# Patient Record
Sex: FEMALE | Race: WHITE | NOT HISPANIC OR LATINO | Employment: FULL TIME | URBAN - METROPOLITAN AREA
[De-identification: names, ages, dates, MRNs, and addresses within clinical notes are randomized per-mention and may not be internally consistent; named-entity substitution may affect disease eponyms.]

---

## 2017-01-06 ENCOUNTER — GENERIC CONVERSION - ENCOUNTER (OUTPATIENT)
Dept: OTHER | Facility: OTHER | Age: 57
End: 2017-01-06

## 2017-01-11 ENCOUNTER — GENERIC CONVERSION - ENCOUNTER (OUTPATIENT)
Dept: OTHER | Facility: OTHER | Age: 57
End: 2017-01-11

## 2017-01-25 ENCOUNTER — GENERIC CONVERSION - ENCOUNTER (OUTPATIENT)
Dept: OTHER | Facility: OTHER | Age: 57
End: 2017-01-25

## 2017-02-03 ENCOUNTER — GENERIC CONVERSION - ENCOUNTER (OUTPATIENT)
Dept: OTHER | Facility: OTHER | Age: 57
End: 2017-02-03

## 2017-03-06 ENCOUNTER — GENERIC CONVERSION - ENCOUNTER (OUTPATIENT)
Dept: OTHER | Facility: OTHER | Age: 57
End: 2017-03-06

## 2017-03-17 ENCOUNTER — GENERIC CONVERSION - ENCOUNTER (OUTPATIENT)
Dept: OTHER | Facility: OTHER | Age: 57
End: 2017-03-17

## 2017-05-25 ENCOUNTER — ALLSCRIPTS OFFICE VISIT (OUTPATIENT)
Dept: OTHER | Facility: OTHER | Age: 57
End: 2017-05-25

## 2017-06-13 ENCOUNTER — HOSPITAL ENCOUNTER (EMERGENCY)
Facility: HOSPITAL | Age: 57
Discharge: HOME | End: 2017-06-13
Attending: EMERGENCY MEDICINE | Admitting: EMERGENCY MEDICINE
Payer: COMMERCIAL

## 2017-06-13 ENCOUNTER — APPOINTMENT (EMERGENCY)
Dept: RADIOLOGY | Facility: HOSPITAL | Age: 57
End: 2017-06-13
Payer: COMMERCIAL

## 2017-06-13 VITALS
HEIGHT: 61 IN | DIASTOLIC BLOOD PRESSURE: 65 MMHG | RESPIRATION RATE: 18 BRPM | SYSTOLIC BLOOD PRESSURE: 141 MMHG | OXYGEN SATURATION: 98 % | HEART RATE: 96 BPM | TEMPERATURE: 98.4 F | BODY MASS INDEX: 31.72 KG/M2 | WEIGHT: 168 LBS

## 2017-06-13 DIAGNOSIS — S90.02XA CONTUSION OF LEFT ANKLE: Primary | ICD-10-CM

## 2017-06-13 PROCEDURE — 99283 EMERGENCY DEPT VISIT LOW MDM: CPT

## 2017-06-13 PROCEDURE — 73610 X-RAY EXAM OF ANKLE: CPT | Performed by: EMERGENCY MEDICINE

## 2017-06-13 PROCEDURE — A9270 NON-COVERED ITEM OR SERVICE: HCPCS | Performed by: EMERGENCY MEDICINE

## 2017-06-13 RX ORDER — ACETAMINOPHEN 325 MG/1
650 TABLET ORAL ONCE
Status: COMPLETED | OUTPATIENT
Start: 2017-06-13 | End: 2017-06-13

## 2017-06-13 RX ADMIN — ACETAMINOPHEN 650 MG: 325 TABLET ORAL at 13:58

## 2017-06-26 DIAGNOSIS — R92.8 OTHER ABNORMAL AND INCONCLUSIVE FINDINGS ON DIAGNOSTIC IMAGING OF BREAST: ICD-10-CM

## 2017-06-26 DIAGNOSIS — Z12.31 ENCOUNTER FOR SCREENING MAMMOGRAM FOR MALIGNANT NEOPLASM OF BREAST: ICD-10-CM

## 2017-06-28 ENCOUNTER — GENERIC CONVERSION - ENCOUNTER (OUTPATIENT)
Dept: OTHER | Facility: OTHER | Age: 57
End: 2017-06-28

## 2017-07-08 ENCOUNTER — APPOINTMENT (EMERGENCY)
Dept: CT IMAGING | Facility: HOSPITAL | Age: 57
End: 2017-07-08
Payer: COMMERCIAL

## 2017-07-08 ENCOUNTER — HOSPITAL ENCOUNTER (EMERGENCY)
Facility: HOSPITAL | Age: 57
Discharge: HOME | End: 2017-07-08
Attending: EMERGENCY MEDICINE | Admitting: EMERGENCY MEDICINE
Payer: COMMERCIAL

## 2017-07-08 VITALS
WEIGHT: 171.96 LBS | HEIGHT: 61 IN | BODY MASS INDEX: 32.47 KG/M2 | HEART RATE: 78 BPM | TEMPERATURE: 98.3 F | DIASTOLIC BLOOD PRESSURE: 75 MMHG | RESPIRATION RATE: 18 BRPM | SYSTOLIC BLOOD PRESSURE: 126 MMHG | OXYGEN SATURATION: 97 %

## 2017-07-08 DIAGNOSIS — R10.9 ABDOMINAL PAIN: Primary | ICD-10-CM

## 2017-07-08 LAB
ALBUMIN SERPL BCP-MCNC: 4.1 G/DL (ref 3.5–5)
ALP SERPL-CCNC: 138 U/L (ref 46–116)
ALT SERPL W P-5'-P-CCNC: 19 U/L (ref 12–78)
ANION GAP SERPL CALCULATED.3IONS-SCNC: 7 MMOL/L (ref 4–13)
AST SERPL W P-5'-P-CCNC: 12 U/L (ref 5–45)
BACTERIA UR QL AUTO: NORMAL /HPF
BASOPHILS # BLD AUTO: 0.06 THOUSANDS/ΜL (ref 0–0.1)
BASOPHILS NFR BLD AUTO: 1 % (ref 0–1)
BILIRUB SERPL-MCNC: 0.2 MG/DL (ref 0.2–1)
BILIRUB UR QL STRIP: NEGATIVE
BUN SERPL-MCNC: 11 MG/DL (ref 5–25)
CALCIUM SERPL-MCNC: 9.9 MG/DL (ref 8.3–10.1)
CHLORIDE SERPL-SCNC: 105 MMOL/L (ref 100–108)
CLARITY UR: CLEAR
CO2 SERPL-SCNC: 29 MMOL/L (ref 21–32)
COLOR UR: YELLOW
CREAT SERPL-MCNC: 0.81 MG/DL (ref 0.6–1.3)
EOSINOPHIL # BLD AUTO: 0.14 THOUSAND/ΜL (ref 0–0.61)
EOSINOPHIL NFR BLD AUTO: 1 % (ref 0–6)
ERYTHROCYTE [DISTWIDTH] IN BLOOD BY AUTOMATED COUNT: 13.4 % (ref 11.6–15.1)
GFR SERPL CREATININE-BSD FRML MDRD: >60 ML/MIN/1.73SQ M
GLUCOSE SERPL-MCNC: 99 MG/DL (ref 65–140)
GLUCOSE UR STRIP-MCNC: NEGATIVE MG/DL
HCT VFR BLD AUTO: 42.6 % (ref 34.8–46.1)
HGB BLD-MCNC: 13.9 G/DL (ref 11.5–15.4)
HGB UR QL STRIP.AUTO: ABNORMAL
KETONES UR STRIP-MCNC: NEGATIVE MG/DL
LEUKOCYTE ESTERASE UR QL STRIP: NEGATIVE
LIPASE SERPL-CCNC: 145 U/L (ref 73–393)
LYMPHOCYTES # BLD AUTO: 4.6 THOUSANDS/ΜL (ref 0.6–4.47)
LYMPHOCYTES NFR BLD AUTO: 39 % (ref 14–44)
MCH RBC QN AUTO: 32 PG (ref 26.8–34.3)
MCHC RBC AUTO-ENTMCNC: 32.6 G/DL (ref 31.4–37.4)
MCV RBC AUTO: 98 FL (ref 82–98)
MONOCYTES # BLD AUTO: 0.79 THOUSAND/ΜL (ref 0.17–1.22)
MONOCYTES NFR BLD AUTO: 7 % (ref 4–12)
NEUTROPHILS # BLD AUTO: 6.06 THOUSANDS/ΜL (ref 1.85–7.62)
NEUTS SEG NFR BLD AUTO: 52 % (ref 43–75)
NITRITE UR QL STRIP: NEGATIVE
NON-SQ EPI CELLS URNS QL MICRO: NORMAL /HPF
NRBC BLD AUTO-RTO: 0 /100 WBCS
PH UR STRIP.AUTO: 6 [PH] (ref 4.5–8)
PLATELET # BLD AUTO: 290 THOUSANDS/UL (ref 149–390)
PMV BLD AUTO: 10.1 FL (ref 8.9–12.7)
POTASSIUM SERPL-SCNC: 4.4 MMOL/L (ref 3.5–5.3)
PROT SERPL-MCNC: 7.4 G/DL (ref 6.4–8.2)
PROT UR STRIP-MCNC: NEGATIVE MG/DL
RBC # BLD AUTO: 4.35 MILLION/UL (ref 3.81–5.12)
RBC #/AREA URNS AUTO: NORMAL /HPF
SODIUM SERPL-SCNC: 141 MMOL/L (ref 136–145)
SP GR UR STRIP.AUTO: <=1.005 (ref 1–1.03)
UROBILINOGEN UR QL STRIP.AUTO: 0.2 E.U./DL
WBC # BLD AUTO: 11.68 THOUSAND/UL (ref 4.31–10.16)
WBC #/AREA URNS AUTO: NORMAL /HPF

## 2017-07-08 PROCEDURE — 99284 EMERGENCY DEPT VISIT MOD MDM: CPT

## 2017-07-08 PROCEDURE — 74176 CT ABD & PELVIS W/O CONTRAST: CPT

## 2017-07-08 PROCEDURE — 36415 COLL VENOUS BLD VENIPUNCTURE: CPT | Performed by: EMERGENCY MEDICINE

## 2017-07-08 PROCEDURE — 85025 COMPLETE CBC W/AUTO DIFF WBC: CPT | Performed by: EMERGENCY MEDICINE

## 2017-07-08 PROCEDURE — 83690 ASSAY OF LIPASE: CPT | Performed by: EMERGENCY MEDICINE

## 2017-07-08 PROCEDURE — 81001 URINALYSIS AUTO W/SCOPE: CPT | Performed by: EMERGENCY MEDICINE

## 2017-07-08 PROCEDURE — 80053 COMPREHEN METABOLIC PANEL: CPT | Performed by: EMERGENCY MEDICINE

## 2017-07-08 RX ORDER — FESOTERODINE FUMARATE 4 MG/1
TABLET, EXTENDED RELEASE ORAL
COMMUNITY
End: 2018-02-08 | Stop reason: SDUPTHER

## 2017-07-08 RX ORDER — TRAMADOL HYDROCHLORIDE 50 MG/1
50 TABLET ORAL EVERY 6 HOURS PRN
Qty: 15 TABLET | Refills: 0 | Status: SHIPPED | OUTPATIENT
Start: 2017-07-08 | End: 2017-07-13

## 2017-07-08 RX ORDER — ASPIRIN 81 MG/1
81 TABLET, CHEWABLE ORAL DAILY
COMMUNITY
End: 2018-02-08 | Stop reason: SDUPTHER

## 2017-07-08 RX ORDER — LEVOTHYROXINE SODIUM 0.05 MG/1
50 TABLET ORAL DAILY
COMMUNITY
End: 2018-02-08 | Stop reason: SDUPTHER

## 2017-07-08 RX ORDER — OXYCODONE HYDROCHLORIDE AND ACETAMINOPHEN 5; 325 MG/1; MG/1
1 TABLET ORAL EVERY 4 HOURS PRN
Qty: 15 TABLET | Refills: 0 | Status: SHIPPED | OUTPATIENT
Start: 2017-07-08 | End: 2017-07-08

## 2017-11-09 ENCOUNTER — ALLSCRIPTS OFFICE VISIT (OUTPATIENT)
Dept: OTHER | Facility: OTHER | Age: 57
End: 2017-11-09

## 2017-11-09 DIAGNOSIS — R53.81 OTHER MALAISE: ICD-10-CM

## 2017-11-09 DIAGNOSIS — R19.7 DIARRHEA: ICD-10-CM

## 2017-11-09 DIAGNOSIS — R11.2 NAUSEA WITH VOMITING: ICD-10-CM

## 2017-11-09 DIAGNOSIS — R74.8 ABNORMAL LEVELS OF OTHER SERUM ENZYMES: ICD-10-CM

## 2017-11-09 DIAGNOSIS — R53.83 OTHER FATIGUE: ICD-10-CM

## 2017-11-10 ENCOUNTER — APPOINTMENT (OUTPATIENT)
Dept: LAB | Facility: HOSPITAL | Age: 57
End: 2017-11-10
Payer: COMMERCIAL

## 2017-11-10 ENCOUNTER — TRANSCRIBE ORDERS (OUTPATIENT)
Dept: ADMINISTRATIVE | Facility: HOSPITAL | Age: 57
End: 2017-11-10

## 2017-11-10 DIAGNOSIS — R19.7 DIARRHEA: ICD-10-CM

## 2017-11-10 DIAGNOSIS — R53.81 OTHER MALAISE: ICD-10-CM

## 2017-11-10 DIAGNOSIS — R11.2 NAUSEA WITH VOMITING: ICD-10-CM

## 2017-11-10 DIAGNOSIS — R53.83 OTHER FATIGUE: ICD-10-CM

## 2017-11-10 LAB
ALBUMIN SERPL BCP-MCNC: 4 G/DL (ref 3.5–5)
ALP SERPL-CCNC: 177 U/L (ref 46–116)
ALT SERPL W P-5'-P-CCNC: 30 U/L (ref 12–78)
ANION GAP SERPL CALCULATED.3IONS-SCNC: 9 MMOL/L (ref 4–13)
AST SERPL W P-5'-P-CCNC: 14 U/L (ref 5–45)
BASOPHILS # BLD AUTO: 0.05 THOUSANDS/ΜL (ref 0–0.1)
BASOPHILS NFR BLD AUTO: 1 % (ref 0–1)
BILIRUB SERPL-MCNC: 0.4 MG/DL (ref 0.2–1)
BUN SERPL-MCNC: 9 MG/DL (ref 5–25)
CALCIUM SERPL-MCNC: 9.6 MG/DL (ref 8.3–10.1)
CHLORIDE SERPL-SCNC: 104 MMOL/L (ref 100–108)
CO2 SERPL-SCNC: 28 MMOL/L (ref 21–32)
CREAT SERPL-MCNC: 0.87 MG/DL (ref 0.6–1.3)
EOSINOPHIL # BLD AUTO: 0.14 THOUSAND/ΜL (ref 0–0.61)
EOSINOPHIL NFR BLD AUTO: 2 % (ref 0–6)
ERYTHROCYTE [DISTWIDTH] IN BLOOD BY AUTOMATED COUNT: 13.2 % (ref 11.6–15.1)
GFR SERPL CREATININE-BSD FRML MDRD: 75 ML/MIN/1.73SQ M
GLUCOSE P FAST SERPL-MCNC: 99 MG/DL (ref 65–99)
HCT VFR BLD AUTO: 41.7 % (ref 34.8–46.1)
HGB BLD-MCNC: 13.4 G/DL (ref 11.5–15.4)
LYMPHOCYTES # BLD AUTO: 2.91 THOUSANDS/ΜL (ref 0.6–4.47)
LYMPHOCYTES NFR BLD AUTO: 36 % (ref 14–44)
MCH RBC QN AUTO: 31.6 PG (ref 26.8–34.3)
MCHC RBC AUTO-ENTMCNC: 32.1 G/DL (ref 31.4–37.4)
MCV RBC AUTO: 98 FL (ref 82–98)
MONOCYTES # BLD AUTO: 0.51 THOUSAND/ΜL (ref 0.17–1.22)
MONOCYTES NFR BLD AUTO: 6 % (ref 4–12)
NEUTROPHILS # BLD AUTO: 4.45 THOUSANDS/ΜL (ref 1.85–7.62)
NEUTS SEG NFR BLD AUTO: 55 % (ref 43–75)
NRBC BLD AUTO-RTO: 0 /100 WBCS
PLATELET # BLD AUTO: 287 THOUSANDS/UL (ref 149–390)
PMV BLD AUTO: 10.2 FL (ref 8.9–12.7)
POTASSIUM SERPL-SCNC: 4.1 MMOL/L (ref 3.5–5.3)
PROT SERPL-MCNC: 7.5 G/DL (ref 6.4–8.2)
RBC # BLD AUTO: 4.24 MILLION/UL (ref 3.81–5.12)
SODIUM SERPL-SCNC: 141 MMOL/L (ref 136–145)
TSH SERPL DL<=0.05 MIU/L-ACNC: 0.97 UIU/ML (ref 0.36–3.74)
WBC # BLD AUTO: 8.09 THOUSAND/UL (ref 4.31–10.16)

## 2017-11-10 PROCEDURE — 86663 EPSTEIN-BARR ANTIBODY: CPT

## 2017-11-10 PROCEDURE — 36415 COLL VENOUS BLD VENIPUNCTURE: CPT

## 2017-11-10 PROCEDURE — 80053 COMPREHEN METABOLIC PANEL: CPT

## 2017-11-10 PROCEDURE — 85025 COMPLETE CBC W/AUTO DIFF WBC: CPT

## 2017-11-10 PROCEDURE — 84443 ASSAY THYROID STIM HORMONE: CPT

## 2017-11-10 PROCEDURE — 86665 EPSTEIN-BARR CAPSID VCA: CPT

## 2017-11-10 PROCEDURE — 86664 EPSTEIN-BARR NUCLEAR ANTIGEN: CPT

## 2017-11-10 PROCEDURE — 86618 LYME DISEASE ANTIBODY: CPT

## 2017-11-10 NOTE — PROGRESS NOTES
Assessment    1  Left acute suppurative otitis media (382 00) (H66 002)   2  Malaise and fatigue (780 79) (R53 81,R53 83)   3  Nausea, vomiting, and diarrhea (787 91,787 01) (R11 2,R19 7)    Plan  Left acute suppurative otitis media    · Azelastine HCl - 0 15 % Nasal Solution; 2 SPRAYS EACH NOSTRIL TWICE DAILY   · Ciprodex 0 3-0 1 % Otic Suspension; INSTILL 4 DROPS IN THE AFFECTEDEAR(S) TWICE DAILY   · Meclizine HCl - 25 MG Oral Tablet; TAKE 1 TABLET 3 TIMES DAILY AS NEEDED  Malaise and fatigue, Nausea, vomiting, and diarrhea    · (1) CBC/PLT/DIFF; Status:Active; Requested for:09Nov2017;    · (1) COMPREHENSIVE METABOLIC PANEL; Status:Active; Requested for:09Nov2017;    · (1) ELIO BARR VIRUS; Status:Active; Requested for:09Nov2017;    · (1) LYME ANTIBODY PROFILE W/REFLEX TO WESTERN BLOT; Status:Active; Requested for:09Nov2017;    · (1) TSH; Status:Active; Requested ELÍAS:[id number];     Discussion/Summary    Patient is to start antibiotic and nasal spray  She is to use meclizine for her vertigo type symptoms  She is to continue increasing her fluids and eating bland foods  If she continues to worsen or is not improving she will follow up  Labs are ordered and we will follow up with her after we get the results  The patient was counseled regarding instructions for management,-- risk factor reductions,-- impressions,-- risks and benefits of treatment options,-- importance of compliance with treatment  Possible side effects of new medications were reviewed with the patient/guardian today  The treatment plan was reviewed with the patient/guardian  The patient/guardian understands and agrees with the treatment plan     Self Referrals: No      Chief Complaint  Pt  in office today c/o nausea, vomiting, diarrhea, nasal congestion, dizziness, fatigue, and cough  History of Present Illness  HPI: Patient began 2 weeks ago with nausea vomiting and then started with diarrhea   She had that off and on felt very fatigued and weak  This week she started with congestion and cough  She now has ear pain and feels some slight dizziness  She still feels extremely fatigued  Otitis Media (Brief): The patient is being seen for an initial evaluation of otitis media  Symptoms:  ear pain,-- fullness of the ear(s),-- difficulty hearing,-- fever,-- nasal congestion-- and-- cough  The patient is currently experiencing symptoms  Associated symptoms:  dizziness-- and-- headache  General Ill Feeling: The patient is being seen for an initial evaluation of a general ill feeling  Symptoms:  general ill feeling,-- malaise,-- fatigue,-- fever-- and-- chills, but-- no arthralgias,-- no myalgias,-- no muscle weakness,-- no joint swelling,-- no joint stiffness,-- no weight loss,-- no weight gain,-- no difficulty sleeping,-- no anxiety,-- no depression-- and-- no suicidal thoughts  Associated symptoms:  headache,-- vertigo,-- cough,-- anorexia,-- nausea,-- vomiting-- and-- diarrhea, but-- no visual disturbance,-- no temporal tenderness,-- no lightheadedness,-- no chest pain,-- no shortness of breath,-- no edema,-- no abdominal pain,-- no constipation,-- no melena,-- no hematochezia,-- no dysuria,-- no polyuria,-- no polydipsia,-- no menstrual irregularity-- and-- no decreased libido  Review of Systems   Constitutional: as noted in HPI   ENT: as noted in HPI  Respiratory: as noted in HPI  Gastrointestinal: as noted in HPI  Neurological: as noted in HPI  ROS reviewed  Active Problems  1  Asthma without status asthmaticus (493 90) (J45 909)   2  Degeneration of lumbar intervertebral disc (722 52) (M51 36)   3  Hypothyroidism (244 9) (E03 9)   4  Menopause ovarian failure (256 39) (E28 39)   5  Mixed hyperlipidemia (272 2) (E78 2)   6  Seasonal allergies (477 9) (J30 2)    Past Medical History    1  History of Acute upper respiratory infection (465 9) (J06 9)   2  History of Acute URI (465 9) (J06 9)   3   History of Breast pain in female (611 71) (N64 4)   4  History of C  difficile diarrhea (008 45) (A04 72)   5  History of Colon cancer screening (V76 51) (Z12 11)   6  History of Encounter for gynecological examination with Papanicolaou smear of cervix (V72 31) (Z01 419)   7  History of Encounter for mammogram to establish baseline mammogram (V76 12) (Z12 31)   8  History of Enteritis (558 9) (K52 9)   9  History of abnormal mammogram (V15 89) (Z87 898)   10  History of abrasion (V15 59) (Z87 828)   11  History of acute bronchitis (V12 69) (Z87 09)   12  History of acute sinusitis (V12 69) (Z87 09)   13  History of chronic cystitis (V13 00) (Z87 448)   14  History of colonoscopy (V45 89) (Z98 890)   15  History of diarrhea (V12 79) (Z87 898)   16  History of dizziness (V13 89) (Z87 898)   17  History of dysuria (V13 00) (Z87 898)   18  History of epigastric pain (V12 79) (Z87 19)   19  History of gross hematuria (V13 09) (Z87 448)   20  History of headache (V13 89) (Z87 898)   21  History of photophobia (V49 89) (Z78 9)   22  History of screening mammography (V15 89) (Z92 89)   23  History of screening mammography (V15 89) (Z92 89)   24  History of upper respiratory infection (V12 09) (Z87 09)   25  History of viral infection (V12 09) (Z86 19)   26  Hypercholesterolemia (272 0) (E78 00)   27  History of Leg pain (729 5) (M79 606)   28  History of Non-suppurative otitis media, bilateral   29  History of Snake bite (989 5,E905 0) ([place])   27  History of Tick bite (919 4,E906 4) [facility])  Active Problems And Past Medical History Reviewed: The active problems and past medical history were reviewed and updated today  Family History  Mother    1  Family history of Heart disease (429 9) (I51 9)  Father    2  Family history of Heart disease (429 9) (I51 9)  Paternal Grandfather    3  Family history of Cancer (199 1) (C80 1)  Paternal Great Grandfather    4  Family history of Heart disease (429 9) (I51 9)  Family History Reviewed:    The family history was reviewed and updated today  Social History   · Always uses seat belt   · Does not drink alcohol (V49 89) (Z78 9)   · Employed   · No drug use   · Occasional caffeine consumption   · Seeing a dentist   · Single   · Smoker (305 1) (F17 200)  The social history was reviewed and is unchanged  Surgical History    1  History of Cholecystectomy   2  History of Hysteroscopy With Endometrial Ablation   3  History of Knee Surgery   4  History of Ovarian Cystectomy   5  History of Shoulder Surgery  Surgical History Reviewed: The surgical history was reviewed and updated today  Current Meds   1  Aspirin 81 MG TABS; Therapy: (Recorded:29Oct2014) to Recorded   2  Levocetirizine Dihydrochloride 5 MG Oral Tablet; take 1 tablet by mouth every day; Therapy: 67LTU4043 to (Chikis Roa)  Requested for: 99Wrd5201; Last Rx:[id number] Ordered   3  Levothyroxine Sodium 50 MCG Oral Tablet; take 1 tablet by mouth every day; Therapy: 18Kjl1938 to (Chikis Roa)  Requested for: 48Uaj7792; Last Rx:76Xkw4761 Ordered   4  Orphenadrine Citrate  MG Oral Tablet Extended Release 12 Hour; TAKE 1 TABLET BY MOUTH TWICE A DAY AS NEEDED; Therapy: 06ZSB6624 to (Evaluate:36Qbk2853)  Requested for: 06ZKN7375; Last Rx:14Jun2017 Ordered   5  Probiotic CAPS; Therapy: (Recorded:18Jun2015) to Recorded   6  Toviaz 4 MG Oral Tablet Extended Release 24 Hour; Therapy: (Recorded:74Tny4899) to Recorded   7  Ventolin  (90 Base) MCG/ACT Inhalation Aerosol Solution; 2 puffs every 4 hours as needed; Therapy: 73Khw2381 to (Evaluate:19Jun2017)  Requested for: 77HGU1672; Last Rx:74Lpi8154 Ordered   8  Vitamin B Complex Oral Tablet; Therapy: (Recorded:18Jun2015) to Recorded   9  Vitamin D 1000 UNIT CAPS; Therapy: (Recorded:18Jun2015) to Recorded    Allergies  1  Aminosyn SOLN   2  Procalamine 3 % Intravenous Solution   3  amoxicillin   4  Ceftin   5  Cipro   6  erythromycin   7  fentanyl   8  morphine   9  Sulfa Drugs   10  Zithromax   11  acetaminophen-codeine   12  Avocado Oil OIL   13  Imitrex TABS   14  Lactose  15  IVP Dye   16  Dairy   17  Eggs   18  Gluten   19  Other   20  Wheat    Vitals   Recorded: 10IHR2520 03:01PM   Temperature 99 2 F   Heart Rate 98   Systolic 127   Diastolic 76   Height 5 ft    Weight 164 lb    BMI Calculated 32 03   BSA Calculated 1 72   O2 Saturation 100       Physical Exam   Constitutional  General appearance: Abnormal   acutely ill  Ears, Nose, Mouth, and Throat  External inspection of ears and nose: Normal    Otoscopic examination: Abnormal  -- Left TM is erythematous and with mucoid exudate  Nasal mucosa, septum, and turbinates: Normal without edema or erythema  Oropharynx: Abnormal  -- Mild erythema  Pulmonary  Respiratory effort: No increased work of breathing or signs of respiratory distress  Auscultation of lungs: Clear to auscultation  Cardiovascular  Auscultation of heart: Normal rate and rhythm, normal S1 and S2, without murmurs           Signatures   Electronically signed by : Gisselle Dhillon, [facility]; Nov 9 2017  3:58PM EST                       (Author)    Electronically signed by : JORDON Watkins ; Nov 9 2017  5:09PM EST

## 2017-11-11 LAB
EBV EA IGG SER-ACNC: <9 U/ML (ref 0–8.9)
EBV NA IGG SER IA-ACNC: 147 U/ML (ref 0–17.9)
EBV PATRN SPEC IB-IMP: ABNORMAL
EBV VCA IGG SER IA-ACNC: 195 U/ML (ref 0–17.9)
EBV VCA IGM SER IA-ACNC: <36 U/ML (ref 0–35.9)

## 2017-11-13 ENCOUNTER — TRANSCRIBE ORDERS (OUTPATIENT)
Dept: ADMINISTRATIVE | Facility: HOSPITAL | Age: 57
End: 2017-11-13

## 2017-11-13 ENCOUNTER — APPOINTMENT (OUTPATIENT)
Dept: LAB | Facility: HOSPITAL | Age: 57
End: 2017-11-13
Payer: COMMERCIAL

## 2017-11-13 DIAGNOSIS — R19.7 DIARRHEA: ICD-10-CM

## 2017-11-13 DIAGNOSIS — R11.2 NAUSEA WITH VOMITING: ICD-10-CM

## 2017-11-13 DIAGNOSIS — R74.8 ELEVATED LIVER ENZYMES: Primary | ICD-10-CM

## 2017-11-13 LAB
B BURGDOR IGG SER IA-ACNC: 0.17
B BURGDOR IGM SER IA-ACNC: 0.2

## 2017-11-13 PROCEDURE — 36415 COLL VENOUS BLD VENIPUNCTURE: CPT

## 2017-11-13 PROCEDURE — 80074 ACUTE HEPATITIS PANEL: CPT

## 2017-11-14 LAB
HAV IGM SER QL: NORMAL
HBV CORE IGM SER QL: NORMAL
HBV SURFACE AG SER QL: NORMAL
HCV AB SER QL: NORMAL

## 2017-11-24 ENCOUNTER — HOSPITAL ENCOUNTER (OUTPATIENT)
Dept: RADIOLOGY | Age: 57
Discharge: HOME | End: 2017-11-24
Payer: COMMERCIAL

## 2017-11-24 DIAGNOSIS — R74.8 ELEVATED LIVER ENZYMES: ICD-10-CM

## 2017-11-24 PROCEDURE — 76705 ECHO EXAM OF ABDOMEN: CPT

## 2017-12-08 ENCOUNTER — ALLSCRIPTS OFFICE VISIT (OUTPATIENT)
Dept: OTHER | Facility: OTHER | Age: 57
End: 2017-12-08

## 2017-12-28 ENCOUNTER — LAB REQUISITION (OUTPATIENT)
Dept: LAB | Facility: HOSPITAL | Age: 57
End: 2017-12-28
Payer: COMMERCIAL

## 2017-12-28 ENCOUNTER — ALLSCRIPTS OFFICE VISIT (OUTPATIENT)
Dept: OTHER | Facility: OTHER | Age: 57
End: 2017-12-28

## 2017-12-28 DIAGNOSIS — R30.0 DYSURIA: ICD-10-CM

## 2017-12-28 LAB
CLARITY UR: NORMAL
COLOR UR: NORMAL
GLUCOSE (HISTORICAL): NORMAL
HGB UR QL STRIP.AUTO: NORMAL
KETONES UR STRIP-MCNC: NORMAL MG/DL
LEUKOCYTE ESTERASE UR QL STRIP: NORMAL
NITRITE UR QL STRIP: NORMAL
PH UR STRIP.AUTO: 5 [PH]
PROT UR STRIP-MCNC: NORMAL MG/DL
SP GR UR STRIP.AUTO: 1.01

## 2017-12-28 PROCEDURE — 87086 URINE CULTURE/COLONY COUNT: CPT | Performed by: PHYSICIAN ASSISTANT

## 2017-12-29 LAB — BACTERIA UR CULT: NORMAL

## 2018-01-14 VITALS
HEIGHT: 60 IN | DIASTOLIC BLOOD PRESSURE: 86 MMHG | TEMPERATURE: 99.6 F | BODY MASS INDEX: 31.41 KG/M2 | HEART RATE: 95 BPM | OXYGEN SATURATION: 97 % | WEIGHT: 160 LBS | SYSTOLIC BLOOD PRESSURE: 140 MMHG

## 2018-01-14 VITALS
HEART RATE: 98 BPM | HEIGHT: 60 IN | SYSTOLIC BLOOD PRESSURE: 140 MMHG | WEIGHT: 164 LBS | BODY MASS INDEX: 32.2 KG/M2 | DIASTOLIC BLOOD PRESSURE: 76 MMHG | OXYGEN SATURATION: 100 % | TEMPERATURE: 99.2 F

## 2018-01-22 VITALS
OXYGEN SATURATION: 98 % | TEMPERATURE: 99.1 F | WEIGHT: 164.38 LBS | DIASTOLIC BLOOD PRESSURE: 88 MMHG | BODY MASS INDEX: 32.27 KG/M2 | SYSTOLIC BLOOD PRESSURE: 128 MMHG | HEIGHT: 60 IN | HEART RATE: 83 BPM

## 2018-01-23 VITALS
OXYGEN SATURATION: 98 % | SYSTOLIC BLOOD PRESSURE: 124 MMHG | HEIGHT: 60 IN | DIASTOLIC BLOOD PRESSURE: 74 MMHG | HEART RATE: 99 BPM | BODY MASS INDEX: 32.39 KG/M2 | TEMPERATURE: 98.8 F | WEIGHT: 165 LBS

## 2018-01-23 NOTE — MISCELLANEOUS
Message  Return to work or school:   Aniya Mathis is under my professional care  She was seen in my office on 12/08/17   She is able to return to work on  12/11/17       MONICA Espinoza        Signatures   Electronically signed by : MONICA Espinoza; Dec  8 2017  8:39AM EST                       (Author)

## 2018-02-08 ENCOUNTER — HOSPITAL ENCOUNTER (OUTPATIENT)
Dept: RADIOLOGY | Facility: HOSPITAL | Age: 58
Discharge: HOME | End: 2018-02-08
Payer: COMMERCIAL

## 2018-02-08 ENCOUNTER — OFFICE VISIT (OUTPATIENT)
Dept: FAMILY MEDICINE CLINIC | Facility: CLINIC | Age: 58
End: 2018-02-08
Payer: COMMERCIAL

## 2018-02-08 VITALS
HEIGHT: 61 IN | TEMPERATURE: 98.6 F | SYSTOLIC BLOOD PRESSURE: 134 MMHG | DIASTOLIC BLOOD PRESSURE: 76 MMHG | OXYGEN SATURATION: 99 % | WEIGHT: 172 LBS | RESPIRATION RATE: 16 BRPM | HEART RATE: 101 BPM | BODY MASS INDEX: 32.47 KG/M2

## 2018-02-08 DIAGNOSIS — M79.641 RIGHT HAND PAIN: ICD-10-CM

## 2018-02-08 DIAGNOSIS — M79.641 RIGHT HAND PAIN: Primary | ICD-10-CM

## 2018-02-08 PROBLEM — R74.8 ABNORMAL LIVER ENZYMES: Status: ACTIVE | Noted: 2017-11-10

## 2018-02-08 PROCEDURE — 73130 X-RAY EXAM OF HAND: CPT

## 2018-02-08 PROCEDURE — 99213 OFFICE O/P EST LOW 20 MIN: CPT | Performed by: NURSE PRACTITIONER

## 2018-02-08 RX ORDER — ORPHENADRINE CITRATE 100 MG/1
1 TABLET, EXTENDED RELEASE ORAL 2 TIMES DAILY PRN
COMMUNITY
Start: 2014-06-16 | End: 2018-09-16 | Stop reason: SDUPTHER

## 2018-02-08 RX ORDER — LEVOCETIRIZINE DIHYDROCHLORIDE 5 MG/1
1 TABLET, FILM COATED ORAL DAILY
COMMUNITY
Start: 2015-05-20 | End: 2018-03-24 | Stop reason: SDUPTHER

## 2018-02-08 RX ORDER — AZELASTINE HCL 205.5 UG/1
2 SPRAY NASAL 2 TIMES DAILY
COMMUNITY
Start: 2017-11-09 | End: 2018-04-25 | Stop reason: SDUPTHER

## 2018-02-08 RX ORDER — FESOTERODINE FUMARATE 4 MG/1
1 TABLET, EXTENDED RELEASE ORAL DAILY
COMMUNITY
End: 2019-04-01 | Stop reason: SDUPTHER

## 2018-02-08 RX ORDER — LEVOTHYROXINE SODIUM 0.05 MG/1
1 TABLET ORAL DAILY
COMMUNITY
Start: 2015-04-21 | End: 2018-03-24 | Stop reason: SDUPTHER

## 2018-02-08 NOTE — ASSESSMENT & PLAN NOTE
To obtain x-ray now, to call for results tomorrow  Will provide ortho referral if fracture present  Continue ice  Follow-up as needed

## 2018-02-08 NOTE — PROGRESS NOTES
Assessment/Plan:    Right hand pain  To obtain x-ray now, to call for results tomorrow  Will provide ortho referral if fracture present  Continue ice  Follow-up as needed  Diagnoses and all orders for this visit:    Right hand pain  -     XR hand 3+ vw right; Future          Subjective:      Patient ID: Meet Vergara is a 56 y o  female  Hakan Han presents reporting right hand pain  The pain began 1 week ago after she fell while holding a glass cup  Since this time, she had noted a lump at the base of her thumb and on the palmar aspect of her hand in line with her 1st digit  She is having swelling and pain  Denies ecchymosis or laceration  She has been treating her symptoms with ice which has provided some relief  Denies decrease in range of motion or change in sensation  Fall         The following portions of the patient's history were reviewed and updated as appropriate: She  has a past medical history of Disease of thyroid gland  She  does not have any pertinent problems on file  She  has a past surgical history that includes Carpal tunnel release  Her family history is not on file  She  reports that she has been smoking Cigarettes  She has been smoking about 1 00 pack per day  She has never used smokeless tobacco  She reports that she does not drink alcohol or use drugs    Current Outpatient Prescriptions   Medication Sig Dispense Refill    aspirin 81 MG tablet Take 1 tablet by mouth daily      Azelastine HCl 0 15 % SOLN 2 sprays into each nostril 2 (two) times a day      cholecalciferol (VITAMIN D3) 1,000 units tablet Take by mouth      Fesoterodine Fumarate ER (TOVIAZ) 4 MG TB24 Take 1 tablet by mouth daily      levocetirizine (XYZAL) 5 MG tablet Take 1 tablet by mouth daily      levothyroxine 50 mcg tablet Take 1 tablet by mouth daily      magnesium hydroxide (MILK OF MAGNESIA) 400 mg/5 mL oral suspension Take 30 mL by mouth daily as needed for constipation 360 mL 0    orphenadrine (NORFLEX) 100 mg tablet Take 1 tablet by mouth 2 (two) times a day as needed      Probiotic Product (PROBIOTIC-10) CAPS Take by mouth      VENTOLIN  (90 Base) MCG/ACT inhaler Inhale 2 puffs every 4 (four) hours as needed  0    VITAMIN B COMPLEX-C PO Take by mouth       No current facility-administered medications for this visit  Current Outpatient Prescriptions on File Prior to Visit   Medication Sig    magnesium hydroxide (MILK OF MAGNESIA) 400 mg/5 mL oral suspension Take 30 mL by mouth daily as needed for constipation    [DISCONTINUED] Fesoterodine Fumarate ER (TOVIAZ) 4 MG TB24 Take by mouth    [DISCONTINUED] levothyroxine 50 mcg tablet Take 50 mcg by mouth daily    [DISCONTINUED] aspirin 81 mg chewable tablet Chew 81 mg daily     No current facility-administered medications on file prior to visit  She is allergic to aminosyn; contrast dye  [iodinated diagnostic agents]; iohexol; procalamine; procalamine; amoxicillin; avocado; cefuroxime; ciprocinonide [fluocinolone]; ciprofloxacin; dairy aid  [lactase]; eggs or egg-derived products; erythromycin; fentanyl; gluten meal; macrobid [nitrofurantoin]; morphine; nsaids; sulfa antibiotics; sumatriptan; wheat bran; and zithromax [azithromycin]       Review of Systems   Constitutional: Negative  Respiratory: Negative  Cardiovascular: Negative  Musculoskeletal:        Right hand pain and swelling  Skin: Negative  Neurological: Negative  Psychiatric/Behavioral: Negative  /76   Pulse 101   Temp 98 6 °F (37 °C)   Resp 16   Ht 5' 1" (1 549 m)   Wt 78 kg (172 lb)   SpO2 99%   BMI 32 50 kg/m²     Objective:     Physical Exam   Constitutional: She is oriented to person, place, and time  She appears well-developed and well-nourished  Cardiovascular: Normal rate, regular rhythm and normal heart sounds      Pulmonary/Chest: Effort normal and breath sounds normal    Musculoskeletal:     No obvious deformity in right hand  Skin intact  Presence of mild swelling at palmar aspect of right hand in line with 2nd digit  Presence of tenderness there as well as base of right thumb  Also presence of palpable abnormality at base of thumb and palmar aspect of right hand in line with 2nd digit  Sensation to light touch intact  Neurological: She is alert and oriented to person, place, and time  Skin: Skin is intact  Psychiatric: She has a normal mood and affect   Her speech is normal and behavior is normal  Judgment and thought content normal  Cognition and memory are normal

## 2018-02-09 NOTE — PROGRESS NOTES
Pt has a ortho previous for carpal tunnel she is going [facility] them they are with [facility] she just farias not remember the name

## 2018-03-24 DIAGNOSIS — E03.9 ACQUIRED HYPOTHYROIDISM: ICD-10-CM

## 2018-03-24 DIAGNOSIS — J30.1 CHRONIC SEASONAL ALLERGIC RHINITIS DUE TO POLLEN: Primary | ICD-10-CM

## 2018-03-26 RX ORDER — LEVOTHYROXINE SODIUM 0.05 MG/1
TABLET ORAL
Qty: 90 TABLET | Refills: 1 | Status: SHIPPED | OUTPATIENT
Start: 2018-03-26 | End: 2018-10-09 | Stop reason: SDUPTHER

## 2018-03-26 RX ORDER — LEVOCETIRIZINE DIHYDROCHLORIDE 5 MG/1
TABLET, FILM COATED ORAL
Qty: 90 TABLET | Refills: 1 | Status: SHIPPED | OUTPATIENT
Start: 2018-03-26 | End: 2018-10-09 | Stop reason: SDUPTHER

## 2018-04-03 ENCOUNTER — OFFICE VISIT (OUTPATIENT)
Dept: FAMILY MEDICINE CLINIC | Facility: CLINIC | Age: 58
End: 2018-04-03
Payer: COMMERCIAL

## 2018-04-03 VITALS
HEART RATE: 103 BPM | WEIGHT: 171 LBS | BODY MASS INDEX: 32.28 KG/M2 | HEIGHT: 61 IN | TEMPERATURE: 99.6 F | DIASTOLIC BLOOD PRESSURE: 78 MMHG | SYSTOLIC BLOOD PRESSURE: 114 MMHG | OXYGEN SATURATION: 98 %

## 2018-04-03 DIAGNOSIS — R19.7 DIARRHEA OF PRESUMED INFECTIOUS ORIGIN: ICD-10-CM

## 2018-04-03 DIAGNOSIS — R19.7 DIARRHEA IN ADULT PATIENT: Primary | ICD-10-CM

## 2018-04-03 PROCEDURE — 99213 OFFICE O/P EST LOW 20 MIN: CPT | Performed by: PHYSICIAN ASSISTANT

## 2018-04-03 RX ORDER — IBUPROFEN 800 MG/1
TABLET ORAL
COMMUNITY
Start: 2018-03-27 | End: 2018-04-05 | Stop reason: SINTOL

## 2018-04-03 RX ORDER — VANCOMYCIN HYDROCHLORIDE 125 MG/1
125 CAPSULE ORAL 4 TIMES DAILY
Qty: 40 CAPSULE | Refills: 0 | Status: SHIPPED | OUTPATIENT
Start: 2018-04-03 | End: 2018-04-05 | Stop reason: SDUPTHER

## 2018-04-03 NOTE — PROGRESS NOTES
Assessment/Plan:       Diagnoses and all orders for this visit:    Diarrhea in adult patient  -     vancomycin (VANCOCIN) 125 MG capsule; Take 1 capsule (125 mg total) by mouth 4 (four) times a day for 10 days  -     Clostridium difficile toxin by PCR; Future  -     Comprehensive metabolic panel; Future  -     Celiac Disease Antibody Profile; Future  -     CBC and differential; Future  -     Amylase; Future  -     Cancel: Ambulatory referral to Gastroenterology; Future  -     Ambulatory referral to Gastroenterology; Future    Diarrhea of presumed infectious origin  -     Clostridium difficile toxin by PCR; Future  -     Comprehensive metabolic panel; Future  -     Celiac Disease Antibody Profile; Future  -     CBC and differential; Future  -     Amylase; Future  -     Cancel: Ambulatory referral to Gastroenterology; Future  -     Ambulatory referral to Gastroenterology; Future              Subjective:      Patient ID: Luca Kirkpatrick is a 56 y o  female  Patient is here with bowel changes that she feels are as very similar to when she had C diff in the past       Abdominal Pain   This is a new problem  The current episode started 1 to 4 weeks ago  The onset quality is sudden  The problem occurs 2 to 4 times per day  The problem has been gradually worsening  The pain is located in the RLQ and LLQ  The pain is at a severity of 7/10  The pain is severe  The quality of the pain is cramping  Associated symptoms include anorexia, diarrhea, a fever and nausea  Pertinent negatives include no vomiting  Nothing aggravates the pain  The pain is relieved by nothing  She has tried nothing for the symptoms  Diarrhea    This is a new problem  The current episode started 1 to 4 weeks ago  The problem occurs 5 to 10 times per day  The problem has been gradually worsening  The stool consistency is described as watery  The patient states that diarrhea awakens her from sleep   Associated symptoms include abdominal pain, chills and a fever  Pertinent negatives include no vomiting  Nothing aggravates the symptoms  There are no known risk factors  She has tried nothing for the symptoms  Nausea   This is a new problem  The current episode started 1 to 4 weeks ago  The problem occurs 2 to 4 times per day  The problem has been waxing and waning  Associated symptoms include abdominal pain, anorexia, a change in bowel habit, chills, fatigue, a fever and nausea  Pertinent negatives include no vomiting  Nothing aggravates the symptoms  She has tried nothing for the symptoms  The following portions of the patient's history were reviewed and updated as appropriate:   She has a past medical history of Disease of thyroid gland  ,   does not have any pertinent problems on file  ,   has a past surgical history that includes Carpal tunnel release  ,  family history is not on file  ,   reports that she has been smoking Cigarettes  She has been smoking about 1 00 pack per day  She has never used smokeless tobacco  She reports that she does not drink alcohol or use drugs  ,  is allergic to aminosyn; contrast dye  [iodinated diagnostic agents]; iohexol; procalamine; amoxicillin; avocado; cefuroxime; ciprocinonide [fluocinolone]; ciprofloxacin; dairy aid  [lactase]; eggs or egg-derived products; erythromycin; fentanyl; gluten meal; macrobid [nitrofurantoin]; morphine; nsaids; sulfa antibiotics; sumatriptan; wheat bran; and zithromax [azithromycin]     Current Outpatient Prescriptions   Medication Sig Dispense Refill    aspirin 81 MG tablet Take 1 tablet by mouth daily      Azelastine HCl 0 15 % SOLN 2 sprays into each nostril 2 (two) times a day      cholecalciferol (VITAMIN D3) 1,000 units tablet Take by mouth      Fesoterodine Fumarate ER (TOVIAZ) 4 MG TB24 Take 1 tablet by mouth daily      levocetirizine (XYZAL) 5 MG tablet TAKE 1 TABLET BY MOUTH EVERY DAY 90 tablet 1    levothyroxine 50 mcg tablet TAKE 1 TABLET BY MOUTH EVERY DAY 90 tablet 1    orphenadrine (NORFLEX) 100 mg tablet Take 1 tablet by mouth 2 (two) times a day as needed      Probiotic Product (PROBIOTIC-10) CAPS Take by mouth      VENTOLIN  (90 Base) MCG/ACT inhaler Inhale 2 puffs every 4 (four) hours as needed  0    VITAMIN B COMPLEX-C PO Take by mouth      ibuprofen (MOTRIN) 800 mg tablet       magnesium hydroxide (MILK OF MAGNESIA) 400 mg/5 mL oral suspension Take 30 mL by mouth daily as needed for constipation 360 mL 0    vancomycin (VANCOCIN) 125 MG capsule Take 1 capsule (125 mg total) by mouth 4 (four) times a day for 10 days 40 capsule 0     No current facility-administered medications for this visit  Review of Systems   Constitutional: Positive for chills, fatigue and fever  Gastrointestinal: Positive for abdominal pain, anorexia, change in bowel habit, diarrhea and nausea  Negative for vomiting  Genitourinary: Negative for difficulty urinating  Objective:  Vitals:    04/03/18 1528   BP: 114/78   Pulse: 103   Temp: 99 6 °F (37 6 °C)   SpO2: 98%   Weight: 77 6 kg (171 lb)   Height: 5' 1" (1 549 m)     Body mass index is 32 31 kg/m²  Physical Exam   Constitutional: She is oriented to person, place, and time  She appears well-developed and well-nourished  No distress  Cardiovascular: Normal rate, regular rhythm and normal heart sounds  Pulmonary/Chest: Effort normal and breath sounds normal    Abdominal: Soft  She exhibits no distension and no mass  Bowel sounds are increased  There is no hepatosplenomegaly  There is tenderness  There is no rebound, no guarding and no CVA tenderness  Neurological: She is alert and oriented to person, place, and time  Psychiatric: She has a normal mood and affect  Her behavior is normal    Vitals reviewed

## 2018-04-04 ENCOUNTER — APPOINTMENT (OUTPATIENT)
Dept: LAB | Facility: CLINIC | Age: 58
End: 2018-04-04
Payer: COMMERCIAL

## 2018-04-04 DIAGNOSIS — R19.7 DIARRHEA OF PRESUMED INFECTIOUS ORIGIN: ICD-10-CM

## 2018-04-04 DIAGNOSIS — R19.7 DIARRHEA IN ADULT PATIENT: ICD-10-CM

## 2018-04-04 LAB
ALBUMIN SERPL BCP-MCNC: 4.2 G/DL (ref 3.5–5)
ALP SERPL-CCNC: 131 U/L (ref 46–116)
ALT SERPL W P-5'-P-CCNC: 27 U/L (ref 12–78)
AMYLASE SERPL-CCNC: 42 IU/L (ref 25–115)
ANION GAP SERPL CALCULATED.3IONS-SCNC: 3 MMOL/L (ref 4–13)
AST SERPL W P-5'-P-CCNC: 11 U/L (ref 5–45)
BASOPHILS # BLD AUTO: 0.02 THOUSANDS/ΜL (ref 0–0.1)
BASOPHILS NFR BLD AUTO: 0 % (ref 0–1)
BILIRUB SERPL-MCNC: 0.43 MG/DL (ref 0.2–1)
BUN SERPL-MCNC: 13 MG/DL (ref 5–25)
CALCIUM SERPL-MCNC: 9.7 MG/DL (ref 8.3–10.1)
CHLORIDE SERPL-SCNC: 105 MMOL/L (ref 100–108)
CO2 SERPL-SCNC: 30 MMOL/L (ref 21–32)
CREAT SERPL-MCNC: 0.83 MG/DL (ref 0.6–1.3)
EOSINOPHIL # BLD AUTO: 0.14 THOUSAND/ΜL (ref 0–0.61)
EOSINOPHIL NFR BLD AUTO: 2 % (ref 0–6)
ERYTHROCYTE [DISTWIDTH] IN BLOOD BY AUTOMATED COUNT: 13.9 % (ref 11.6–15.1)
GFR SERPL CREATININE-BSD FRML MDRD: 80 ML/MIN/1.73SQ M
GLUCOSE P FAST SERPL-MCNC: 95 MG/DL (ref 65–99)
HCT VFR BLD AUTO: 41.8 % (ref 34.8–46.1)
HGB BLD-MCNC: 13.9 G/DL (ref 11.5–15.4)
LYMPHOCYTES # BLD AUTO: 3.95 THOUSANDS/ΜL (ref 0.6–4.47)
LYMPHOCYTES NFR BLD AUTO: 46 % (ref 14–44)
MCH RBC QN AUTO: 32.3 PG (ref 26.8–34.3)
MCHC RBC AUTO-ENTMCNC: 33.3 G/DL (ref 31.4–37.4)
MCV RBC AUTO: 97 FL (ref 82–98)
MONOCYTES # BLD AUTO: 0.64 THOUSAND/ΜL (ref 0.17–1.22)
MONOCYTES NFR BLD AUTO: 8 % (ref 4–12)
NEUTROPHILS # BLD AUTO: 3.69 THOUSANDS/ΜL (ref 1.85–7.62)
NEUTS SEG NFR BLD AUTO: 44 % (ref 43–75)
NRBC BLD AUTO-RTO: 0 /100 WBCS
PLATELET # BLD AUTO: 285 THOUSANDS/UL (ref 149–390)
PMV BLD AUTO: 10.6 FL (ref 8.9–12.7)
POTASSIUM SERPL-SCNC: 4 MMOL/L (ref 3.5–5.3)
PROT SERPL-MCNC: 7.4 G/DL (ref 6.4–8.2)
RBC # BLD AUTO: 4.3 MILLION/UL (ref 3.81–5.12)
SODIUM SERPL-SCNC: 138 MMOL/L (ref 136–145)
WBC # BLD AUTO: 8.45 THOUSAND/UL (ref 4.31–10.16)

## 2018-04-04 PROCEDURE — 80053 COMPREHEN METABOLIC PANEL: CPT

## 2018-04-04 PROCEDURE — 85025 COMPLETE CBC W/AUTO DIFF WBC: CPT

## 2018-04-04 PROCEDURE — 86255 FLUORESCENT ANTIBODY SCREEN: CPT

## 2018-04-04 PROCEDURE — 82784 ASSAY IGA/IGD/IGG/IGM EACH: CPT

## 2018-04-04 PROCEDURE — 82150 ASSAY OF AMYLASE: CPT

## 2018-04-04 PROCEDURE — 83516 IMMUNOASSAY NONANTIBODY: CPT

## 2018-04-04 PROCEDURE — 36415 COLL VENOUS BLD VENIPUNCTURE: CPT

## 2018-04-05 ENCOUNTER — TELEPHONE (OUTPATIENT)
Dept: FAMILY MEDICINE CLINIC | Facility: CLINIC | Age: 58
End: 2018-04-05

## 2018-04-05 ENCOUNTER — APPOINTMENT (OUTPATIENT)
Dept: LAB | Facility: HOSPITAL | Age: 58
End: 2018-04-05
Payer: COMMERCIAL

## 2018-04-05 DIAGNOSIS — R19.7 DIARRHEA IN ADULT PATIENT: ICD-10-CM

## 2018-04-05 DIAGNOSIS — R19.7 DIARRHEA OF PRESUMED INFECTIOUS ORIGIN: ICD-10-CM

## 2018-04-05 LAB — C DIFF TOX GENS STL QL NAA+PROBE: NORMAL

## 2018-04-05 PROCEDURE — 87493 C DIFF AMPLIFIED PROBE: CPT

## 2018-04-05 RX ORDER — VANCOMYCIN HYDROCHLORIDE 125 MG/1
125 CAPSULE ORAL 4 TIMES DAILY
Qty: 40 CAPSULE | Refills: 0 | Status: SHIPPED | OUTPATIENT
Start: 2018-04-05 | End: 2018-04-15

## 2018-04-05 NOTE — TELEPHONE ENCOUNTER
Pt called to state that she "does not take Motrin , and would like it off her med list , [facility] into [facility]  And she cannot take it  Due to an ulcer"- also her V[facility] wrong [facility] - can you resend to  (was [facility] [place]  and they wouldn't transfer it )

## 2018-04-06 LAB
ENDOMYSIUM IGA SER QL: NEGATIVE
GLIADIN PEPTIDE IGA SER-ACNC: 4 UNITS (ref 0–19)
GLIADIN PEPTIDE IGG SER-ACNC: 3 UNITS (ref 0–19)
IGA SERPL-MCNC: 73 MG/DL (ref 87–352)
TTG IGA SER-ACNC: <2 U/ML (ref 0–3)
TTG IGG SER-ACNC: <2 U/ML (ref 0–5)

## 2018-04-25 DIAGNOSIS — J30.2 SEASONAL ALLERGIC RHINITIS, UNSPECIFIED TRIGGER: Primary | ICD-10-CM

## 2018-04-26 RX ORDER — AZELASTINE HCL 205.5 UG/1
SPRAY NASAL
Qty: 30 ML | Refills: 2 | Status: SHIPPED | OUTPATIENT
Start: 2018-04-26 | End: 2018-07-10

## 2018-05-25 ENCOUNTER — TELEPHONE (OUTPATIENT)
Dept: FAMILY MEDICINE CLINIC | Facility: CLINIC | Age: 58
End: 2018-05-25

## 2018-05-25 DIAGNOSIS — J06.9 URI, ACUTE: Primary | ICD-10-CM

## 2018-05-25 RX ORDER — DOXYCYCLINE HYCLATE 100 MG/1
100 CAPSULE ORAL EVERY 12 HOURS SCHEDULED
Qty: 20 CAPSULE | Refills: 0 | Status: SHIPPED | OUTPATIENT
Start: 2018-05-25 | End: 2018-06-04

## 2018-05-25 NOTE — TELEPHONE ENCOUNTER
Pt called and has pain and fever in both swelling in both ear drum along with red spots    She ask if you can call in something for her  [facility] [phone]

## 2018-06-05 ENCOUNTER — TELEPHONE (OUTPATIENT)
Dept: FAMILY MEDICINE CLINIC | Facility: CLINIC | Age: 58
End: 2018-06-05

## 2018-06-05 DIAGNOSIS — H60.90 OTITIS EXTERNA, UNSPECIFIED CHRONICITY, UNSPECIFIED LATERALITY, UNSPECIFIED TYPE: Primary | ICD-10-CM

## 2018-06-05 NOTE — TELEPHONE ENCOUNTER
Pt said oral antibiotics werent called in for her last week  Still has her ear infection  Wondering if you could call in antibiotic ear drops

## 2018-06-12 ENCOUNTER — OFFICE VISIT (OUTPATIENT)
Dept: FAMILY MEDICINE CLINIC | Facility: CLINIC | Age: 58
End: 2018-06-12
Payer: COMMERCIAL

## 2018-06-12 VITALS
HEIGHT: 61 IN | TEMPERATURE: 98.6 F | SYSTOLIC BLOOD PRESSURE: 102 MMHG | WEIGHT: 175 LBS | BODY MASS INDEX: 33.04 KG/M2 | HEART RATE: 92 BPM | DIASTOLIC BLOOD PRESSURE: 58 MMHG | OXYGEN SATURATION: 99 %

## 2018-06-12 DIAGNOSIS — H66.006 RECURRENT ACUTE SUPPURATIVE OTITIS MEDIA WITHOUT SPONTANEOUS RUPTURE OF TYMPANIC MEMBRANE OF BOTH SIDES: Primary | ICD-10-CM

## 2018-06-12 DIAGNOSIS — M54.50 CHRONIC BILATERAL LOW BACK PAIN WITHOUT SCIATICA: ICD-10-CM

## 2018-06-12 DIAGNOSIS — G89.29 CHRONIC BILATERAL LOW BACK PAIN WITHOUT SCIATICA: ICD-10-CM

## 2018-06-12 PROCEDURE — 3008F BODY MASS INDEX DOCD: CPT | Performed by: PHYSICIAN ASSISTANT

## 2018-06-12 PROCEDURE — 99213 OFFICE O/P EST LOW 20 MIN: CPT | Performed by: PHYSICIAN ASSISTANT

## 2018-06-12 RX ORDER — TRAMADOL HYDROCHLORIDE 50 MG/1
50 TABLET ORAL EVERY 6 HOURS PRN
Qty: 30 TABLET | Refills: 0 | Status: SHIPPED | OUTPATIENT
Start: 2018-06-12 | End: 2018-10-09 | Stop reason: SDUPTHER

## 2018-06-12 RX ORDER — CIPROFLOXACIN AND DEXAMETHASONE 3; 1 MG/ML; MG/ML
4 SUSPENSION/ DROPS AURICULAR (OTIC) 2 TIMES DAILY
Qty: 7.5 ML | Refills: 0 | Status: SHIPPED | OUTPATIENT
Start: 2018-06-12 | End: 2018-07-10

## 2018-06-12 NOTE — PROGRESS NOTES
Assessment/Plan:       Diagnoses and all orders for this visit:    Recurrent acute suppurative otitis media without spontaneous rupture of tympanic membrane of both sides  -     ciprofloxacin-dexamethasone (CIPRODEX) otic suspension; Administer 4 drops into both ears 2 (two) times a day    Chronic bilateral low back pain without sciatica  -     traMADol (ULTRAM) 50 mg tablet; Take 1 tablet (50 mg total) by mouth every 6 (six) hours as needed for moderate pain         Subjective:      Patient ID: Mena Fernandez is a 56 y o  female  Earache    There is pain in both ears  This is a recurrent problem  The current episode started 1 to 4 weeks ago  The problem occurs constantly  The problem has been gradually worsening  The maximum temperature recorded prior to her arrival was 100 4 - 100 9 F  The fever has been present for 3 to 4 days  The pain is at a severity of 7/10  The pain is moderate  Associated symptoms include coughing, ear discharge and hearing loss  Pertinent negatives include no rhinorrhea or sore throat  She has tried ear drops for the symptoms  The treatment provided no relief  Her past medical history is significant for hearing loss  The following portions of the patient's history were reviewed and updated as appropriate:   She has a past medical history of Disease of thyroid gland  ,   does not have any pertinent problems on file  ,   has a past surgical history that includes Carpal tunnel release  ,  family history is not on file  ,   reports that she has been smoking Cigarettes  She has been smoking about 1 00 pack per day  She has never used smokeless tobacco  She reports that she does not drink alcohol or use drugs  ,  is allergic to aminosyn; contrast dye  [iodinated diagnostic agents]; iohexol; procalamine; amoxicillin; avocado; cefuroxime; ciprocinonide [fluocinolone]; ciprofloxacin; dairy aid  [lactase]; eggs or egg-derived products; erythromycin; fentanyl; gluten meal; macrobid [nitrofurantoin]; morphine; nsaids; sulfa antibiotics; sumatriptan; wheat bran; and zithromax [azithromycin]     Current Outpatient Prescriptions   Medication Sig Dispense Refill    aspirin 81 MG tablet Take 1 tablet by mouth daily      Azelastine HCl 0 15 % SOLN INSTILL 2 SPRAYS IN EACH NOSTRIL TWICE A DAY 30 mL 2    cholecalciferol (VITAMIN D3) 1,000 units tablet Take by mouth      Fesoterodine Fumarate ER (TOVIAZ) 4 MG TB24 Take 1 tablet by mouth daily      levocetirizine (XYZAL) 5 MG tablet TAKE 1 TABLET BY MOUTH EVERY DAY 90 tablet 1    levothyroxine 50 mcg tablet TAKE 1 TABLET BY MOUTH EVERY DAY 90 tablet 1    magnesium hydroxide (MILK OF MAGNESIA) 400 mg/5 mL oral suspension Take 30 mL by mouth daily as needed for constipation 360 mL 0    neomycin-polymyxin-hydrocortisone (CORTISPORIN) otic solution Administer 4 drops into the left ear every 6 (six) hours 10 mL 0    orphenadrine (NORFLEX) 100 mg tablet Take 1 tablet by mouth 2 (two) times a day as needed      Probiotic Product (PROBIOTIC-10) CAPS Take by mouth      VENTOLIN  (90 Base) MCG/ACT inhaler Inhale 2 puffs every 4 (four) hours as needed  0    VITAMIN B COMPLEX-C PO Take by mouth      ciprofloxacin-dexamethasone (CIPRODEX) otic suspension Administer 4 drops into both ears 2 (two) times a day 7 5 mL 0    traMADol (ULTRAM) 50 mg tablet Take 1 tablet (50 mg total) by mouth every 6 (six) hours as needed for moderate pain 30 tablet 0     No current facility-administered medications for this visit  Review of Systems   Constitutional: Positive for chills and fever  HENT: Positive for ear discharge, ear pain and hearing loss  Negative for congestion, rhinorrhea, sinus pressure, sore throat, tinnitus and trouble swallowing  Respiratory: Positive for cough  Negative for shortness of breath  Allergic/Immunologic: Positive for food allergies           Objective:  Vitals:    06/12/18 0925   BP: 102/58   Pulse: 92   Temp: 98 6 °F (37 °C)   SpO2: 99%   Weight: 79 4 kg (175 lb)   Height: 5' 1" (1 549 m)     Body mass index is 33 07 kg/m²  Physical Exam   Constitutional: She is oriented to person, place, and time  She appears well-developed and well-nourished  HENT:   Head: Normocephalic  Right Ear: External ear normal  Tympanic membrane is scarred  A middle ear effusion is present  Left Ear: External ear and ear canal normal  Tympanic membrane is injected  A middle ear effusion is present  Mouth/Throat: Oropharynx is clear and moist    Cardiovascular: Normal rate and regular rhythm  Pulmonary/Chest: Effort normal and breath sounds normal    Lymphadenopathy:     She has no cervical adenopathy  Neurological: She is alert and oriented to person, place, and time  Psychiatric: She has a normal mood and affect  Her behavior is normal    Nursing note and vitals reviewed

## 2018-06-12 NOTE — PATIENT INSTRUCTIONS
Otitis Media, Ambulatory Care   GENERAL INFORMATION:   Otitis media  is an ear infection  Common symptoms include the following:   · Fever or a headache    · Ear pain    · Trouble hearing    · Ear feels plugged or full or you have ringing or buzzing in your ear    · Dizziness or you lose your balance    · Nausea or vomiting  Seek immediate care for the following symptoms:   · Seizure    · Fever and a stiff neck  Treatment for otitis media  may include any of the following:  · NSAIDs  help decrease swelling and pain or fever  This medicine is available with or without a doctor's order  NSAIDs can cause stomach bleeding or kidney problems in certain people  If you take blood thinner medicine, always ask your healthcare provider if NSAIDs are safe for you  Always read the medicine label and follow directions  · Ear drops  to help treat your ear pain  · Antibiotics  to help kill the germs that caused your ear infection  Care for otitis media:   · Use heat  Place a warm, moist washcloth on your ear to decrease pain  Apply for 15 to 20 minutes, 3 to 4 times a day    · Use ice  Ice helps decrease swelling and pain  Use an ice pack or put crushed ice in a plastic bag  Cover the ice pack with a towel and place it on your ear for 15 to 20 minutes, 3 to 4 times a day for 2 days  Prevent otitis media:   · Wash your hands often  This will help prevent the spread of germs  Encourage everyone in your house to wash their hands with soap and water after they use the bathroom  Everyone should also wash their hands after they change a child's diaper and before they prepare or eat food  · Stay away from people who are ill  Germs are easily and quickly spread through contact  Follow up with your healthcare provider as directed:  Write down your questions so you remember to ask them during your visits  CARE AGREEMENT:   You have the right to help plan your care   Learn about your health condition and how it may be treated  Discuss treatment options with your caregivers to decide what care you want to receive  You always have the right to refuse treatment  The above information is an  only  It is not intended as medical advice for individual conditions or treatments  Talk to your doctor, nurse or pharmacist before following any medical regimen to see if it is safe and effective for you  © 2014 [address] is for End User's use only and may not be sold, redistributed or otherwise used for commercial purposes  All illustrations and images included in CareNotes® are the copyrighted property of [employer]  or Fabio Mccarty  Since the patient does not tolerate oral antibiotics well we will send in for Cipro eardrops  She has an appointment with an ENT for further evaluation

## 2018-06-20 ENCOUNTER — OFFICE VISIT (OUTPATIENT)
Dept: GASTROENTEROLOGY | Facility: CLINIC | Age: 58
End: 2018-06-20
Payer: COMMERCIAL

## 2018-06-20 VITALS
HEART RATE: 96 BPM | BODY MASS INDEX: 33.11 KG/M2 | DIASTOLIC BLOOD PRESSURE: 78 MMHG | WEIGHT: 175.25 LBS | SYSTOLIC BLOOD PRESSURE: 126 MMHG

## 2018-06-20 DIAGNOSIS — R19.7 DIARRHEA, UNSPECIFIED TYPE: Primary | ICD-10-CM

## 2018-06-20 DIAGNOSIS — R14.0 BLOATING: ICD-10-CM

## 2018-06-20 DIAGNOSIS — R11.0 NAUSEA: ICD-10-CM

## 2018-06-20 DIAGNOSIS — R10.9 ABDOMINAL CRAMPING: ICD-10-CM

## 2018-06-20 DIAGNOSIS — Z12.11 SCREENING FOR COLON CANCER: ICD-10-CM

## 2018-06-20 PROBLEM — K58.0 IRRITABLE BOWEL SYNDROME WITH DIARRHEA: Status: ACTIVE | Noted: 2018-06-20

## 2018-06-20 PROCEDURE — 99244 OFF/OP CNSLTJ NEW/EST MOD 40: CPT | Performed by: INTERNAL MEDICINE

## 2018-06-20 NOTE — PROGRESS NOTES
Consultation - [facility]) [facility]  Debi Estaurdo 1960 56 y o  female     ASSESSMENT @ PLAN:   She is a 51-year-old female with a history of Clostridium difficile and irritable bowel syndrome who comes in worth 4 months periumbilical abdominal pain bloating gas cramping and diarrhea consistent with an IBS flare  Her last colonoscopy with a benign polyp was in 2010     1 will do colonoscopy to investigate; she had a negative stool C diff recently    2 she will continue on her probiotic    3 she will continue on her gluten free diet    4 after the procedure I will give her Xifaxan course; we did speak about this    5 she has had central weight gain with this diagnosis of fatty liver and she needs to lose weight    Chief Complaint:  Abdominal pain and diarrhea    HPI:  She is a 51-year-old female with periumbilical abdominal bloating and cramping  She has been having severe symptoms for approximately 4 months  She also reports diarrhea and constipation with severe fluctuations in her bowel pattern  She reports episodes of fecal incontinence  She reports gas and bloating  She denies melena or hematochezia  She is on a probiotic  She is doing gluten free diet  She has a long history of irritable bowel syndrome after having horrible case of Clostridium difficile infection in 2010  She had a colonoscopy at that time were removed 2 small benign polyps  She recently had a negative stool Clostridium difficile PCR test   Nobody in the family has colon cancer malignancy or Crohn's disease  REVIEW OF SYSTEMS:     CONSTITUTIONAL: Denies any fever, chills, or rigors  Good appetite, and no recent weight loss  HEENT: No earache or tinnitus  Denies hearing loss or visual disturbances  CARDIOVASCULAR: No chest pain or palpitations  RESPIRATORY: Denies any cough, hemoptysis, shortness of breath or dyspnea on exertion  GASTROINTESTINAL: As noted in the History of Present Illness     GENITOURINARY: No problems with urination  Denies any hematuria or dysuria  NEUROLOGIC: No dizziness or vertigo, denies headaches  MUSCULOSKELETAL: Denies any muscle or joint pain  SKIN: Denies skin rashes or itching  ENDOCRINE: Denies excessive thirst  Denies intolerance to heat or cold  PSYCHOSOCIAL: Denies depression or anxiety  Denies any recent memory loss  Past Medical History:   Diagnosis Date    Disease of thyroid gland     Endometriosis     Fibromyalgia       Past Surgical History:   Procedure Laterality Date    CARPAL TUNNEL RELEASE      CARPAL TUNNEL RELEASE      KNEE RECONSTRUCTION, MEDIAL PATELLAR FEMORAL LIGAMENT      screws removed    PLANTAR FASCIECTOMY      left calf    SHOULDER SURGERY      and stem cell surgery on shoulder      Social History     Social History    Marital status: Single     Spouse name: N/A    Number of children: N/A    Years of education: N/A     Occupational History    Not on file  Social History Main Topics    Smoking status: Current Every Day Smoker     Packs/day: 1 00     Types: Cigarettes    Smokeless tobacco: Never Used    Alcohol use No    Drug use: No    Sexual activity: Not on file     Other Topics Concern    Not on file     Social History Narrative    No narrative on file     Family History   Problem Relation Age of Onset    Kidney disease Mother     Heart disease Mother     Heart disease Father      Aminosyn; Contrast dye  [iodinated diagnostic agents]; Iohexol; Procalamine; Dairy aid [lactase]; Fentanyl; Morphine; Nsaids; Amoxicillin; Avocado; Cefuroxime; Ciprocinonide [fluocinolone]; Ciprofloxacin; Codeine; Eggs or egg-derived products; Erythromycin; Gluten meal; Macrobid [nitrofurantoin]; Other; Sulfa antibiotics; Sumatriptan;  Wheat bran; and Zithromax [azithromycin]  Current Outpatient Prescriptions   Medication Sig Dispense Refill    aspirin 81 MG tablet Take 1 tablet by mouth daily      Azelastine HCl 0 15 % SOLN INSTILL 2 SPRAYS IN [facility] NOSTRIL TWICE A DAY 30 mL 2    cholecalciferol (VITAMIN D3) 1,000 units tablet Take by mouth      ciprofloxacin-dexamethasone (CIPRODEX) otic suspension Administer 4 drops into both ears 2 (two) times a day 7 5 mL 0    Fesoterodine Fumarate ER (TOVIAZ) 4 MG TB24 Take 1 tablet by mouth daily      levocetirizine (XYZAL) 5 MG tablet TAKE 1 TABLET BY MOUTH EVERY DAY 90 tablet 1    levothyroxine 50 mcg tablet TAKE 1 TABLET BY MOUTH EVERY DAY 90 tablet 1    orphenadrine (NORFLEX) 100 mg tablet Take 1 tablet by mouth 2 (two) times a day as needed      Probiotic Product (PROBIOTIC-10) CAPS Take by mouth      traMADol (ULTRAM) 50 mg tablet Take 1 tablet (50 mg total) by mouth every 6 (six) hours as needed for moderate pain 30 tablet 0    VENTOLIN  (90 Base) MCG/ACT inhaler Inhale 2 puffs every 4 (four) hours as needed  0    VITAMIN B COMPLEX-C PO Take by mouth      magnesium hydroxide (MILK OF MAGNESIA) 400 mg/5 mL oral suspension Take 30 mL by mouth daily as needed for constipation 360 mL 0    neomycin-polymyxin-hydrocortisone (CORTISPORIN) otic solution Administer 4 drops into the left ear every 6 (six) hours 10 mL 0     No current facility-administered medications for this visit  Blood pressure 126/78, pulse 96, weight 79 5 kg (175 lb 4 oz)  PHYSICAL EXAM:     General Appearance:   Alert, cooperative, no distress, appears stated age    HEENT:   Normocephalic, atraumatic, anicteric      Neck:  Supple, symmetrical, trachea midline, no adenopathy;    thyroid: no enlargement/tenderness/nodules; no carotid  bruit or JVD    Lungs:   Clear to auscultation bilaterally; no rales, rhonchi or wheezing; respirations unlabored    Heart[de-identified]   S1 and S2 normal; regular rate and rhythm; no murmur, rub, or gallop     Abdomen:   Soft, non-tender, non-distended; normal bowel sounds; no masses, no organomegaly    Genitalia:   Deferred    Rectal:   Deferred    Extremities:  No cyanosis, clubbing or edema    Pulses:  2+ and symmetric all extremities    Skin:  Skin color, texture, turgor normal, no rashes or lesions    Lymph nodes:  No palpable cervical, axillary or inguinal lymphadenopathy        Lab Results   Component Value Date    WBC 8 45 04/04/2018    HGB 13 9 04/04/2018    HCT 41 8 04/04/2018    MCV 97 04/04/2018     04/04/2018     Lab Results   Component Value Date    GLUCOSE 99 07/08/2017    CALCIUM 9 7 04/04/2018     04/04/2018    K 4 0 04/04/2018    CO2 30 04/04/2018     04/04/2018    BUN 13 04/04/2018    CREATININE 0 83 04/04/2018     Lab Results   Component Value Date    ALT 27 04/04/2018    AST 11 04/04/2018    ALKPHOS 131 (H) 04/04/2018    BILITOT 0 43 04/04/2018     No results found for: INR, PROTIME

## 2018-06-20 NOTE — LETTER
June 20, 2018     Victorina Chew, DO  [address] [facility] [address]    Patient: Ana Gomez   YOB: 1960   Date of Visit: 6/20/2018       Dear Dr Talita Arriola: Thank you for referring Carisa Murcia to me for evaluation  Below are my notes for this consultation  If you have questions, please do not hesitate to call me  I look forward to following your patient along with you  Sincerely,        Mayank Clancy MD        CC: No Recipients  Mayank Clancy MD  6/20/2018  4:24 PM  Sign at close encounter  Consultation - [address] [facility]  Ana Gomez 1960 56 y o  female     ASSESSMENT @ PLAN:   She is a 68-year-old female with a history of Clostridium difficile and irritable bowel syndrome who comes in worth 4 months periumbilical abdominal pain bloating gas cramping and diarrhea consistent with an IBS flare  Her last colonoscopy with a benign polyp was in 2010     1 will do colonoscopy to investigate; she had a negative stool C diff recently    2 she will continue on her probiotic    3 she will continue on her gluten free diet    4 after the procedure I will give her Xifaxan course; we did speak about this    5 she has had central weight gain with this diagnosis of fatty liver and she needs to lose weight    Chief Complaint:  Abdominal pain and diarrhea    HPI:  She is a 68-year-old female with periumbilical abdominal bloating and cramping  She has been having severe symptoms for approximately 4 months  She also reports diarrhea and constipation with severe fluctuations in her bowel pattern  She reports episodes of fecal incontinence  She reports gas and bloating  She denies melena or hematochezia  She is on a probiotic  She is doing gluten free diet  She has a long history of irritable bowel syndrome after having horrible case of Clostridium difficile infection in 2010    She had a colonoscopy at that time were removed 2 small benign polyps  She recently had a negative stool Clostridium difficile PCR test   Nobody in the family has colon cancer malignancy or Crohn's disease  REVIEW OF SYSTEMS:     CONSTITUTIONAL: Denies any fever, chills, or rigors  Good appetite, and no recent weight loss  HEENT: No earache or tinnitus  Denies hearing loss or visual disturbances  CARDIOVASCULAR: No chest pain or palpitations  RESPIRATORY: Denies any cough, hemoptysis, shortness of breath or dyspnea on exertion  GASTROINTESTINAL: As noted in the History of Present Illness  GENITOURINARY: No problems with urination  Denies any hematuria or dysuria  NEUROLOGIC: No dizziness or vertigo, denies headaches  MUSCULOSKELETAL: Denies any muscle or joint pain  SKIN: Denies skin rashes or itching  ENDOCRINE: Denies excessive thirst  Denies intolerance to heat or cold  PSYCHOSOCIAL: Denies depression or anxiety  Denies any recent memory loss  Past Medical History:   Diagnosis Date    Disease of thyroid gland     Endometriosis     Fibromyalgia       Past Surgical History:   Procedure Laterality Date    CARPAL TUNNEL RELEASE      CARPAL TUNNEL RELEASE      KNEE RECONSTRUCTION, MEDIAL PATELLAR FEMORAL LIGAMENT      screws removed    PLANTAR FASCIECTOMY      left calf    SHOULDER SURGERY      and stem cell surgery on shoulder      Social History     Social History    Marital status: Single     Spouse name: N/A    Number of children: N/A    Years of education: N/A     Occupational History    Not on file       Social History Main Topics    Smoking status: Current Every Day Smoker     Packs/day: 1 00     Types: Cigarettes    Smokeless tobacco: Never Used    Alcohol use No    Drug use: No    Sexual activity: Not on file     Other Topics Concern    Not on file     Social History Narrative    No narrative on file     Family History   Problem Relation Age of Onset    Kidney disease Mother     Heart disease Mother     Heart disease Father      Aminosyn; Contrast dye  [iodinated diagnostic agents]; Iohexol; Procalamine; Dairy aid [lactase]; Fentanyl; Morphine; Nsaids; Amoxicillin; Avocado; Cefuroxime; Ciprocinonide [fluocinolone]; Ciprofloxacin; Codeine; Eggs or egg-derived products; Erythromycin; Gluten meal; Macrobid [nitrofurantoin]; Other; Sulfa antibiotics; Sumatriptan; Wheat bran; and Zithromax [azithromycin]  Current Outpatient Prescriptions   Medication Sig Dispense Refill    aspirin 81 MG tablet Take 1 tablet by mouth daily      Azelastine HCl 0 15 % SOLN INSTILL 2 SPRAYS IN EACH NOSTRIL TWICE A DAY 30 mL 2    cholecalciferol (VITAMIN D3) 1,000 units tablet Take by mouth      ciprofloxacin-dexamethasone (CIPRODEX) otic suspension Administer 4 drops into both ears 2 (two) times a day 7 5 mL 0    Fesoterodine Fumarate ER (TOVIAZ) 4 MG TB24 Take 1 tablet by mouth daily      levocetirizine (XYZAL) 5 MG tablet TAKE 1 TABLET BY MOUTH EVERY DAY 90 tablet 1    levothyroxine 50 mcg tablet TAKE 1 TABLET BY MOUTH EVERY DAY 90 tablet 1    orphenadrine (NORFLEX) 100 mg tablet Take 1 tablet by mouth 2 (two) times a day as needed      Probiotic Product (PROBIOTIC-10) CAPS Take by mouth      traMADol (ULTRAM) 50 mg tablet Take 1 tablet (50 mg total) by mouth every 6 (six) hours as needed for moderate pain 30 tablet 0    VENTOLIN  (90 Base) MCG/ACT inhaler Inhale 2 puffs every 4 (four) hours as needed  0    VITAMIN B COMPLEX-C PO Take by mouth      magnesium hydroxide (MILK OF MAGNESIA) 400 mg/5 mL oral suspension Take 30 mL by mouth daily as needed for constipation 360 mL 0    neomycin-polymyxin-hydrocortisone (CORTISPORIN) otic solution Administer 4 drops into the left ear every 6 (six) hours 10 mL 0     No current facility-administered medications for this visit  Blood pressure 126/78, pulse 96, weight 79 5 kg (175 lb 4 oz)      PHYSICAL EXAM:     General Appearance:   Alert, cooperative, no distress, appears stated age  HEENT:   Normocephalic, atraumatic, anicteric      Neck:  Supple, symmetrical, trachea midline, no adenopathy;    thyroid: no enlargement/tenderness/nodules; no carotid  bruit or JVD    Lungs:   Clear to auscultation bilaterally; no rales, rhonchi or wheezing; respirations unlabored    Heart[de-identified]   S1 and S2 normal; regular rate and rhythm; no murmur, rub, or gallop     Abdomen:   Soft, non-tender, non-distended; normal bowel sounds; no masses, no organomegaly    Genitalia:   Deferred    Rectal:   Deferred    Extremities:  No cyanosis, clubbing or edema    Pulses:  2+ and symmetric all extremities    Skin:  Skin color, texture, turgor normal, no rashes or lesions    Lymph nodes:  No palpable cervical, axillary or inguinal lymphadenopathy        Lab Results   Component Value Date    WBC 8 45 04/04/2018    HGB 13 9 04/04/2018    HCT 41 8 04/04/2018    MCV 97 04/04/2018     04/04/2018     Lab Results   Component Value Date    GLUCOSE 99 07/08/2017    CALCIUM 9 7 04/04/2018     04/04/2018    K 4 0 04/04/2018    CO2 30 04/04/2018     04/04/2018    BUN 13 04/04/2018    CREATININE 0 83 04/04/2018     Lab Results   Component Value Date    ALT 27 04/04/2018    AST 11 04/04/2018    ALKPHOS 131 (H) 04/04/2018    BILITOT 0 43 04/04/2018     No results found for: INR, PROTIME

## 2018-07-11 ENCOUNTER — ANESTHESIA (OUTPATIENT)
Dept: PERIOP | Facility: HOSPITAL | Age: 58
End: 2018-07-11
Payer: COMMERCIAL

## 2018-07-11 ENCOUNTER — ANESTHESIA EVENT (OUTPATIENT)
Dept: PERIOP | Facility: HOSPITAL | Age: 58
End: 2018-07-11
Payer: COMMERCIAL

## 2018-07-11 ENCOUNTER — HOSPITAL ENCOUNTER (OUTPATIENT)
Facility: HOSPITAL | Age: 58
Setting detail: OUTPATIENT SURGERY
Discharge: HOME | End: 2018-07-11
Attending: INTERNAL MEDICINE | Admitting: INTERNAL MEDICINE
Payer: COMMERCIAL

## 2018-07-11 VITALS
HEIGHT: 61 IN | RESPIRATION RATE: 18 BRPM | OXYGEN SATURATION: 95 % | BODY MASS INDEX: 31.8 KG/M2 | WEIGHT: 168.43 LBS | TEMPERATURE: 97.7 F | SYSTOLIC BLOOD PRESSURE: 130 MMHG | DIASTOLIC BLOOD PRESSURE: 74 MMHG | HEART RATE: 83 BPM

## 2018-07-11 DIAGNOSIS — K58.0 IRRITABLE BOWEL SYNDROME WITH DIARRHEA: Primary | ICD-10-CM

## 2018-07-11 DIAGNOSIS — R10.9 ABDOMINAL CRAMPING: ICD-10-CM

## 2018-07-11 DIAGNOSIS — R14.0 BLOATING: ICD-10-CM

## 2018-07-11 DIAGNOSIS — Z12.11 SCREENING FOR COLON CANCER: ICD-10-CM

## 2018-07-11 DIAGNOSIS — R11.0 NAUSEA: ICD-10-CM

## 2018-07-11 DIAGNOSIS — R19.7 DIARRHEA, UNSPECIFIED TYPE: ICD-10-CM

## 2018-07-11 PROCEDURE — 88305 TISSUE EXAM BY PATHOLOGIST: CPT | Performed by: PATHOLOGY

## 2018-07-11 PROCEDURE — 45380 COLONOSCOPY AND BIOPSY: CPT | Performed by: INTERNAL MEDICINE

## 2018-07-11 RX ORDER — SODIUM CHLORIDE 9 MG/ML
125 INJECTION, SOLUTION INTRAVENOUS CONTINUOUS
Status: DISCONTINUED | OUTPATIENT
Start: 2018-07-11 | End: 2018-07-11

## 2018-07-11 RX ORDER — SODIUM CHLORIDE 9 MG/ML
50 INJECTION, SOLUTION INTRAVENOUS CONTINUOUS
Status: DISCONTINUED | OUTPATIENT
Start: 2018-07-11 | End: 2018-07-11 | Stop reason: HOSPADM

## 2018-07-11 RX ORDER — PROPOFOL 10 MG/ML
INJECTION, EMULSION INTRAVENOUS AS NEEDED
Status: DISCONTINUED | OUTPATIENT
Start: 2018-07-11 | End: 2018-07-11 | Stop reason: SURG

## 2018-07-11 RX ORDER — SODIUM CHLORIDE, SODIUM LACTATE, POTASSIUM CHLORIDE, CALCIUM CHLORIDE 600; 310; 30; 20 MG/100ML; MG/100ML; MG/100ML; MG/100ML
INJECTION, SOLUTION INTRAVENOUS CONTINUOUS PRN
Status: DISCONTINUED | OUTPATIENT
Start: 2018-07-11 | End: 2018-07-11 | Stop reason: SURG

## 2018-07-11 RX ADMIN — PROPOFOL 50 MG: 10 INJECTION, EMULSION INTRAVENOUS at 11:57

## 2018-07-11 RX ADMIN — PROPOFOL 50 MG: 10 INJECTION, EMULSION INTRAVENOUS at 11:59

## 2018-07-11 RX ADMIN — SODIUM CHLORIDE, SODIUM LACTATE, POTASSIUM CHLORIDE, AND CALCIUM CHLORIDE: .6; .31; .03; .02 INJECTION, SOLUTION INTRAVENOUS at 11:35

## 2018-07-11 RX ADMIN — PROPOFOL 50 MG: 10 INJECTION, EMULSION INTRAVENOUS at 11:56

## 2018-07-11 RX ADMIN — SODIUM CHLORIDE 50 ML/HR: 0.9 INJECTION, SOLUTION INTRAVENOUS at 11:41

## 2018-07-11 RX ADMIN — PROPOFOL 50 MG: 10 INJECTION, EMULSION INTRAVENOUS at 12:02

## 2018-07-11 NOTE — ANESTHESIA PREPROCEDURE EVALUATION
Review of Systems/Medical History  Patient summary reviewed        Cardiovascular  Hyperlipidemia,    Pulmonary  Smoker cigarette smoker  , Asthma , well controlled/ stable Last rescue: < 6 months ago Asthma type of rescue: PRN inhaler,        GI/Hepatic    Bowel prep            Endo/Other  History of thyroid disease , hypothyroidism,      GYN       Hematology  Negative hematology ROS      Musculoskeletal    Arthritis     Neurology  Negative neurology ROS      Psychology   Negative psychology ROS              Physical Exam    Airway    Mallampati score: I  TM Distance: >3 FB  Neck ROM: full     Dental   Comment: No loose,     Cardiovascular  Rhythm: regular, Rate: normal,     Pulmonary  Breath sounds clear to auscultation,     Other Findings        Anesthesia Plan  ASA Score- 2     Anesthesia Type- IV sedation with anesthesia with ASA Monitors  Additional Monitors:   Airway Plan:         Plan Factors- Patient instructed to abstain from smoking on day of procedure  Patient smoked on day of surgery  Induction- intravenous  Postoperative Plan-     Informed Consent- Anesthetic plan and risks discussed with patient  I personally reviewed this patient with the CRNA  Discussed and agreed on the Anesthesia Plan with the CRNA  Rodo Hart

## 2018-07-11 NOTE — DISCHARGE INSTRUCTIONS
Colonoscopy   WHAT YOU NEED TO KNOW:   A colonoscopy is a procedure to examine the inside of your colon (intestine) with a scope  Polyps or tissue growths may have been removed during your colonoscopy  It is normal to feel bloated and to have some abdominal discomfort  You should be passing gas  If you have hemorrhoids or you had polyps removed, you may have a small amount of bleeding  DISCHARGE INSTRUCTIONS:   Seek care immediately if:   · You have a large amount of bright red blood in your bowel movements  · Your abdomen is hard and firm and you have severe pain  · You have sudden trouble breathing  Contact your healthcare provider if:   · You develop a rash or hives  · You have a fever within 24 hours of your procedure       · You have not had a bowel movement for 3 days after your procedure  · You have questions or concerns about your condition or care  Activity:   · Do not lift, strain, or run  for 3 days after your procedure  · Rest after your procedure  You have been given medicine to relax you  Do not  drive or make important decisions until the day after your procedure  Return to your normal activity as directed  · Relieve gas and discomfort from bloating  by lying on your right side with a heating pad on your abdomen  You may need to take short walks to help the gas move out  Eat small meals until bloating is relieved  If you had polyps removed: For 7 days after your procedure:  · Do not  take aspirin  · Do not  go on long car rides  Follow up with your healthcare provider as directed:  Write down your questions so you remember to ask them during your visits  © 2017 [address] [employer] [employer] is for End User's use only and may not be sold, redistributed or otherwise used for commercial purposes  All illustrations and images included in CareNotes® are the copyrighted property of [employer]  or Fabio Mccarty    The above information is an  only  It is not intended as medical advice for individual conditions or treatments  Talk to your doctor, nurse or pharmacist before following any medical regimen to see if it is safe and effective for you

## 2018-07-11 NOTE — OP NOTE
**** GI/ENDOSCOPY REPORT ****     PATIENT NAME: Nathalia Villasenor ------ VISIT ID:  Patient ID:   [id number] YOB: 1960     INTRODUCTION: Colonoscopy - A 56 female patient presents for an outpatient   Colonoscopy at [address] [place] [address]  PREVIOUS COLONOSCOPY: This colonoscopy is being performed for diagnostic   indication     INDICATIONS: Abdominal pain  Diarrhea  CONSENT:  The benefits, risks, and alternatives to the procedure were   discussed and informed consent was obtained from the patient  PREPARATION: EKG, pulse, pulse oximetry and blood pressure were monitored   throughout the procedure  The patient was identified by myself both   verbally and by visual inspection of ID band  Airway Assessment   Classification: Airway class 2 - Visualization of the soft palate, fauces   and uvula  ASA Classification: Class 2 - Patient has mild to moderate   systemic disturbance that may or may not be related to the disorder   requiring surgery  The patient was kept NPO for eight hours prior to the   procedure  The patient received Nulytely in preparation for the procedure  MEDICATIONS: Anesthesia-check records MAC anesthesia  PROCEDURE:  The endoscope was passed without difficulty through the anus   under direct visualization and advanced to the cecum, confirmed by   appendiceal orifice and ileocecal valve  The scope was withdrawn and the   mucosa was carefully examined  The quality of the preparation was good  Cecal Intubation Time: 2 minutes(s) Scope Withdrawal Time: 3 minutes(s)     RECTAL EXAM: Normal rectal exam      FINDINGS:  The terminal ileum, ileocecal valve, cecum, ascending colon,   hepatic flexure, transverse colon, splenic flexure, descending colon,   sigmoid colon, rectosigmoid junction, and rectum appeared to be normal    Multiple random biopsies was taken  A single sessile polyp, measuring   between 5 and 10 mm in size, was found in the hepatic flexure   The polyp   was completely removed by cold biopsy polypectomy  The polyp was retrieved  COMPLICATIONS: There were no complications  IMPRESSIONS: Normal terminal ileum, ileocecal valve, cecum, ascending   colon, hepatic flexure, transverse colon, splenic flexure, descending   colon, sigmoid colon, rectosigmoid junction, and rectum  Multiple biopsies   taken  A single sessile polyp found in the hepatic flexure; removed by   cold biopsy polypectomy  RECOMMENDATIONS: Xifaxan course  Dicyclomine as needed for abdominal pain  Follow-up on the results of the biopsy specimens  Colonoscopy recommended   in 5 years  ESTIMATED BLOOD LOSS: None  PATHOLOGY SPECIMENS: Multiple random biopsies taken  Completely removed by   cold biopsy polypectomy  Yes     PROCEDURE CODES: Colonoscopy with biopsy     ICD-9 Codes: 789 00 Abdominal pain, unspecified site 787 91 Diarrhea 211 3   Benign neoplasm of colon     ICD-10 Codes: R10 Abdominal and pelvic pain R19 7 Diarrhea, unspecified   K63 5 Polyp of colon     PERFORMED BY: JORDON Baltazar  on 07/11/2018  Version 1, electronically signed by JORDON Menchaca  on   07/11/2018 at 12:07

## 2018-07-13 ENCOUNTER — TELEPHONE (OUTPATIENT)
Dept: GASTROENTEROLOGY | Facility: CLINIC | Age: 58
End: 2018-07-13

## 2018-07-13 NOTE — TELEPHONE ENCOUNTER
Spoke with pt and advised pathology results  Submitted PA for Xifaxan on 7/11    Will check status this afternoon and call her after back with info

## 2018-07-16 ENCOUNTER — TELEPHONE (OUTPATIENT)
Dept: GASTROENTEROLOGY | Facility: CLINIC | Age: 58
End: 2018-07-16

## 2018-07-18 ENCOUNTER — TELEPHONE (OUTPATIENT)
Dept: GASTROENTEROLOGY | Facility: CLINIC | Age: 58
End: 2018-07-18

## 2018-08-22 DIAGNOSIS — J30.2 SEASONAL ALLERGIC RHINITIS, UNSPECIFIED TRIGGER: ICD-10-CM

## 2018-08-23 RX ORDER — AZELASTINE HCL 205.5 UG/1
SPRAY NASAL
Qty: 30 ML | Refills: 2 | Status: SHIPPED | OUTPATIENT
Start: 2018-08-23 | End: 2018-10-09 | Stop reason: SDUPTHER

## 2018-09-12 DIAGNOSIS — J45.20 MILD INTERMITTENT ASTHMA WITHOUT COMPLICATION: Primary | ICD-10-CM

## 2018-09-12 RX ORDER — ALBUTEROL SULFATE 90 UG/1
AEROSOL, METERED RESPIRATORY (INHALATION)
Qty: 18 G | Refills: 0 | Status: SHIPPED | OUTPATIENT
Start: 2018-09-12 | End: 2018-10-09 | Stop reason: SDUPTHER

## 2018-09-16 DIAGNOSIS — M62.838 MUSCLE SPASM: Primary | ICD-10-CM

## 2018-09-17 RX ORDER — ORPHENADRINE CITRATE 100 MG/1
TABLET, EXTENDED RELEASE ORAL
Qty: 180 TABLET | Refills: 1 | Status: SHIPPED | OUTPATIENT
Start: 2018-09-17 | End: 2018-10-09 | Stop reason: SDUPTHER

## 2018-10-01 ENCOUNTER — TELEPHONE (OUTPATIENT)
Dept: FAMILY MEDICINE CLINIC | Facility: CLINIC | Age: 58
End: 2018-10-01

## 2018-10-01 DIAGNOSIS — R05.9 COUGH: Primary | ICD-10-CM

## 2018-10-01 RX ORDER — PROMETHAZINE HYDROCHLORIDE AND CODEINE PHOSPHATE 6.25; 1 MG/5ML; MG/5ML
5 SYRUP ORAL EVERY 4 HOURS PRN
Qty: 120 ML | Refills: 0 | Status: SHIPPED | OUTPATIENT
Start: 2018-10-01 | End: 2018-12-26 | Stop reason: SDUPTHER

## 2018-10-01 RX ORDER — KETOCONAZOLE 20 MG/G
CREAM TOPICAL
COMMUNITY
Start: 2018-07-19 | End: 2020-03-26 | Stop reason: ALTCHOICE

## 2018-10-01 NOTE — TELEPHONE ENCOUNTER
Pt called and has an itchy throat and wants to know if you can call in coug syrup for her  [phone]  [phone]

## 2018-10-01 NOTE — TELEPHONE ENCOUNTER
Pt   Constantine Wu that the only thing that ever helps her is the Promethazine /Codiene  - she said she doesn't have a true allergy to codiene

## 2018-10-09 ENCOUNTER — OFFICE VISIT (OUTPATIENT)
Dept: FAMILY MEDICINE CLINIC | Facility: CLINIC | Age: 58
End: 2018-10-09
Payer: COMMERCIAL

## 2018-10-09 VITALS
BODY MASS INDEX: 32.36 KG/M2 | HEART RATE: 100 BPM | HEIGHT: 61 IN | WEIGHT: 171.4 LBS | SYSTOLIC BLOOD PRESSURE: 114 MMHG | TEMPERATURE: 99.9 F | OXYGEN SATURATION: 98 % | DIASTOLIC BLOOD PRESSURE: 62 MMHG

## 2018-10-09 DIAGNOSIS — G89.29 CHRONIC BILATERAL LOW BACK PAIN WITHOUT SCIATICA: ICD-10-CM

## 2018-10-09 DIAGNOSIS — E03.9 HYPOTHYROIDISM, UNSPECIFIED TYPE: ICD-10-CM

## 2018-10-09 DIAGNOSIS — R10.32 LEFT LOWER QUADRANT PAIN: ICD-10-CM

## 2018-10-09 DIAGNOSIS — J30.2 SEASONAL ALLERGIC RHINITIS, UNSPECIFIED TRIGGER: ICD-10-CM

## 2018-10-09 DIAGNOSIS — B34.9 VIRAL SYNDROME: Primary | ICD-10-CM

## 2018-10-09 DIAGNOSIS — J45.20 MILD INTERMITTENT ASTHMA WITHOUT COMPLICATION: ICD-10-CM

## 2018-10-09 DIAGNOSIS — E03.9 ACQUIRED HYPOTHYROIDISM: ICD-10-CM

## 2018-10-09 DIAGNOSIS — J30.1 CHRONIC SEASONAL ALLERGIC RHINITIS DUE TO POLLEN: ICD-10-CM

## 2018-10-09 DIAGNOSIS — M54.50 CHRONIC BILATERAL LOW BACK PAIN WITHOUT SCIATICA: ICD-10-CM

## 2018-10-09 DIAGNOSIS — E78.2 MIXED HYPERLIPIDEMIA: ICD-10-CM

## 2018-10-09 DIAGNOSIS — M62.838 MUSCLE SPASM: ICD-10-CM

## 2018-10-09 DIAGNOSIS — R93.7 ABNORMAL MRI, LUMBAR SPINE: ICD-10-CM

## 2018-10-09 PROCEDURE — 99214 OFFICE O/P EST MOD 30 MIN: CPT | Performed by: FAMILY MEDICINE

## 2018-10-09 RX ORDER — ALBUTEROL SULFATE 90 UG/1
2 AEROSOL, METERED RESPIRATORY (INHALATION) EVERY 4 HOURS PRN
Qty: 18 G | Refills: 5 | Status: SHIPPED | OUTPATIENT
Start: 2018-10-09 | End: 2020-03-26 | Stop reason: SDUPTHER

## 2018-10-09 RX ORDER — LEVOCETIRIZINE DIHYDROCHLORIDE 5 MG/1
5 TABLET, FILM COATED ORAL DAILY
Qty: 90 TABLET | Refills: 5 | Status: SHIPPED | OUTPATIENT
Start: 2018-10-09 | End: 2019-11-08 | Stop reason: SDUPTHER

## 2018-10-09 RX ORDER — TRAMADOL HYDROCHLORIDE 50 MG/1
50 TABLET ORAL EVERY 6 HOURS PRN
Qty: 30 TABLET | Refills: 0 | Status: SHIPPED | OUTPATIENT
Start: 2018-10-09 | End: 2018-11-29 | Stop reason: SDUPTHER

## 2018-10-09 RX ORDER — AZELASTINE HCL 205.5 UG/1
SPRAY NASAL
Qty: 30 ML | Refills: 5 | Status: SHIPPED | OUTPATIENT
Start: 2018-10-09 | End: 2020-03-26 | Stop reason: ALTCHOICE

## 2018-10-09 RX ORDER — ORPHENADRINE CITRATE 100 MG/1
100 TABLET, EXTENDED RELEASE ORAL 2 TIMES DAILY PRN
Qty: 180 TABLET | Refills: 5 | Status: SHIPPED | OUTPATIENT
Start: 2018-10-09 | End: 2019-10-02 | Stop reason: SDUPTHER

## 2018-10-09 RX ORDER — LEVOTHYROXINE SODIUM 0.05 MG/1
50 TABLET ORAL DAILY
Qty: 90 TABLET | Refills: 5 | Status: SHIPPED | OUTPATIENT
Start: 2018-10-09 | End: 2018-11-02 | Stop reason: SDUPTHER

## 2018-10-09 NOTE — PROGRESS NOTES
Assessment/Plan:    Return for annual physical       Problem List Items Addressed This Visit     Mixed hyperlipidemia    Relevant Orders    Comprehensive metabolic panel    Lipid panel    Hypothyroidism    Relevant Orders    TSH, 3rd generation with Free T4 reflex    Viral syndrome - Primary    Relevant Orders    CBC and differential    Lyme Antibody Profile with reflex to WB    Left lower quadrant pain    Relevant Orders    CT abdomen pelvis wo contrast    Abnormal MRI, lumbar spine    Relevant Orders    NM bone scan whole body            Subjective:      Patient ID: Mayte Dyer is a 66 y o  female  Patient comes in with 3 day history of fever  She also notes abnormal MRI of her lumbar spine done in September suggest bony lesion of L4 and recommends whole-body bone scan  She is not a checkup in quite some time nor she had blood work  The following portions of the patient's history were reviewed and updated as appropriate: allergies, current medications, past family history, past medical history, past social history, past surgical history and problem list     Review of Systems   Constitutional: Positive for fatigue and fever  HENT: Negative  Respiratory: Negative  Objective:      /62   Pulse 100   Temp 99 9 °F (37 7 °C)   Ht 5' 1" (1 549 m)   Wt 77 7 kg (171 lb 6 4 oz)   SpO2 98%   BMI 32 39 kg/m²          Physical Exam   Constitutional: She is oriented to person, place, and time  She appears well-developed  HENT:   Head: Normocephalic and atraumatic  Mouth/Throat: Oropharynx is clear and moist    Eyes: Pupils are equal, round, and reactive to light  EOM are normal    Neck: Neck supple  Cardiovascular: Normal rate, regular rhythm and normal heart sounds  Pulmonary/Chest: Effort normal and breath sounds normal    Abdominal: Soft  Bowel sounds are normal    Tenderness left lower quadrant   Musculoskeletal: Normal range of motion     Lymphadenopathy:     She has no cervical adenopathy  Neurological: She is alert and oriented to person, place, and time  Skin: Skin is warm and dry  Psychiatric: She has a normal mood and affect

## 2018-10-13 ENCOUNTER — APPOINTMENT (OUTPATIENT)
Dept: LAB | Facility: HOSPITAL | Age: 58
End: 2018-10-13
Attending: FAMILY MEDICINE
Payer: COMMERCIAL

## 2018-10-13 DIAGNOSIS — E03.9 HYPOTHYROIDISM, UNSPECIFIED TYPE: ICD-10-CM

## 2018-10-13 DIAGNOSIS — B34.9 VIRAL SYNDROME: ICD-10-CM

## 2018-10-13 DIAGNOSIS — E78.2 MIXED HYPERLIPIDEMIA: ICD-10-CM

## 2018-10-13 LAB
ALBUMIN SERPL BCP-MCNC: 3.8 G/DL (ref 3.5–5)
ALP SERPL-CCNC: 150 U/L (ref 46–116)
ALT SERPL W P-5'-P-CCNC: 36 U/L (ref 12–78)
ANION GAP SERPL CALCULATED.3IONS-SCNC: 8 MMOL/L (ref 4–13)
AST SERPL W P-5'-P-CCNC: 15 U/L (ref 5–45)
BASOPHILS # BLD AUTO: 0.03 THOUSANDS/ΜL (ref 0–0.1)
BASOPHILS NFR BLD AUTO: 0 % (ref 0–1)
BILIRUB SERPL-MCNC: 0.5 MG/DL (ref 0.2–1)
BUN SERPL-MCNC: 10 MG/DL (ref 5–25)
CALCIUM SERPL-MCNC: 9.7 MG/DL (ref 8.3–10.1)
CHLORIDE SERPL-SCNC: 105 MMOL/L (ref 100–108)
CHOLEST SERPL-MCNC: 262 MG/DL (ref 50–200)
CO2 SERPL-SCNC: 30 MMOL/L (ref 21–32)
CREAT SERPL-MCNC: 0.74 MG/DL (ref 0.6–1.3)
EOSINOPHIL # BLD AUTO: 0.13 THOUSAND/ΜL (ref 0–0.61)
EOSINOPHIL NFR BLD AUTO: 2 % (ref 0–6)
ERYTHROCYTE [DISTWIDTH] IN BLOOD BY AUTOMATED COUNT: 13.2 % (ref 11.6–15.1)
GFR SERPL CREATININE-BSD FRML MDRD: 91 ML/MIN/1.73SQ M
GLUCOSE P FAST SERPL-MCNC: 98 MG/DL (ref 65–99)
HCT VFR BLD AUTO: 43.3 % (ref 34.8–46.1)
HDLC SERPL-MCNC: 32 MG/DL (ref 40–60)
HGB BLD-MCNC: 13.9 G/DL (ref 11.5–15.4)
IMM GRANULOCYTES # BLD AUTO: 0.05 THOUSAND/UL (ref 0–0.2)
IMM GRANULOCYTES NFR BLD AUTO: 1 % (ref 0–2)
LDLC SERPL CALC-MCNC: 193 MG/DL (ref 0–100)
LYMPHOCYTES # BLD AUTO: 2.51 THOUSANDS/ΜL (ref 0.6–4.47)
LYMPHOCYTES NFR BLD AUTO: 35 % (ref 14–44)
MCH RBC QN AUTO: 32 PG (ref 26.8–34.3)
MCHC RBC AUTO-ENTMCNC: 32.1 G/DL (ref 31.4–37.4)
MCV RBC AUTO: 100 FL (ref 82–98)
MONOCYTES # BLD AUTO: 0.48 THOUSAND/ΜL (ref 0.17–1.22)
MONOCYTES NFR BLD AUTO: 7 % (ref 4–12)
NEUTROPHILS # BLD AUTO: 3.9 THOUSANDS/ΜL (ref 1.85–7.62)
NEUTS SEG NFR BLD AUTO: 55 % (ref 43–75)
NONHDLC SERPL-MCNC: 230 MG/DL
NRBC BLD AUTO-RTO: 0 /100 WBCS
PLATELET # BLD AUTO: 265 THOUSANDS/UL (ref 149–390)
PMV BLD AUTO: 10.3 FL (ref 8.9–12.7)
POTASSIUM SERPL-SCNC: 4.2 MMOL/L (ref 3.5–5.3)
PROT SERPL-MCNC: 7.2 G/DL (ref 6.4–8.2)
RBC # BLD AUTO: 4.35 MILLION/UL (ref 3.81–5.12)
SODIUM SERPL-SCNC: 143 MMOL/L (ref 136–145)
TRIGL SERPL-MCNC: 183 MG/DL
TSH SERPL DL<=0.05 MIU/L-ACNC: 0.63 UIU/ML (ref 0.36–3.74)
WBC # BLD AUTO: 7.1 THOUSAND/UL (ref 4.31–10.16)

## 2018-10-13 PROCEDURE — 36415 COLL VENOUS BLD VENIPUNCTURE: CPT

## 2018-10-13 PROCEDURE — 84443 ASSAY THYROID STIM HORMONE: CPT

## 2018-10-13 PROCEDURE — 80053 COMPREHEN METABOLIC PANEL: CPT

## 2018-10-13 PROCEDURE — 86618 LYME DISEASE ANTIBODY: CPT

## 2018-10-13 PROCEDURE — 80061 LIPID PANEL: CPT

## 2018-10-13 PROCEDURE — 85025 COMPLETE CBC W/AUTO DIFF WBC: CPT

## 2018-10-14 LAB
B BURGDOR IGG SER IA-ACNC: 0.12
B BURGDOR IGM SER IA-ACNC: 0.26

## 2018-10-30 ENCOUNTER — HOSPITAL ENCOUNTER (OUTPATIENT)
Dept: NUCLEAR MEDICINE | Facility: HOSPITAL | Age: 58
Discharge: HOME | End: 2018-10-30
Payer: COMMERCIAL

## 2018-10-30 ENCOUNTER — HOSPITAL ENCOUNTER (OUTPATIENT)
Dept: CT IMAGING | Facility: HOSPITAL | Age: 58
Discharge: HOME | End: 2018-10-30
Payer: COMMERCIAL

## 2018-10-30 DIAGNOSIS — R10.32 LEFT LOWER QUADRANT PAIN: ICD-10-CM

## 2018-10-30 DIAGNOSIS — R93.7 ABNORMAL MRI, LUMBAR SPINE: ICD-10-CM

## 2018-10-30 PROCEDURE — 78306 BONE IMAGING WHOLE BODY: CPT

## 2018-10-30 PROCEDURE — A9503 TC99M MEDRONATE: HCPCS

## 2018-10-30 PROCEDURE — 74176 CT ABD & PELVIS W/O CONTRAST: CPT

## 2018-11-02 DIAGNOSIS — E03.9 ACQUIRED HYPOTHYROIDISM: ICD-10-CM

## 2018-11-02 RX ORDER — LEVOTHYROXINE SODIUM 0.05 MG/1
50 TABLET ORAL DAILY
Qty: 90 TABLET | Refills: 1 | Status: SHIPPED | OUTPATIENT
Start: 2018-11-02 | End: 2020-03-02

## 2018-11-29 DIAGNOSIS — M54.50 CHRONIC BILATERAL LOW BACK PAIN WITHOUT SCIATICA: ICD-10-CM

## 2018-11-29 DIAGNOSIS — G89.29 CHRONIC BILATERAL LOW BACK PAIN WITHOUT SCIATICA: ICD-10-CM

## 2018-11-29 RX ORDER — TRAMADOL HYDROCHLORIDE 50 MG/1
50 TABLET ORAL EVERY 6 HOURS PRN
Qty: 30 TABLET | Refills: 0 | Status: SHIPPED | OUTPATIENT
Start: 2018-11-29 | End: 2020-03-26 | Stop reason: SDUPTHER

## 2018-12-26 DIAGNOSIS — R05.9 COUGH: ICD-10-CM

## 2018-12-26 RX ORDER — PROMETHAZINE HYDROCHLORIDE AND CODEINE PHOSPHATE 6.25; 1 MG/5ML; MG/5ML
5 SYRUP ORAL EVERY 4 HOURS PRN
Qty: 120 ML | Refills: 0 | Status: SHIPPED | OUTPATIENT
Start: 2018-12-26 | End: 2019-10-02 | Stop reason: SDUPTHER

## 2019-04-01 ENCOUNTER — TELEPHONE (OUTPATIENT)
Dept: FAMILY MEDICINE CLINIC | Facility: CLINIC | Age: 59
End: 2019-04-01

## 2019-04-01 DIAGNOSIS — N32.81 OAB (OVERACTIVE BLADDER): Primary | ICD-10-CM

## 2019-04-01 RX ORDER — FESOTERODINE FUMARATE 4 MG/1
1 TABLET, EXTENDED RELEASE ORAL DAILY
Qty: 30 TABLET | Refills: 5 | Status: SHIPPED | OUTPATIENT
Start: 2019-04-01 | End: 2019-11-18 | Stop reason: SDUPTHER

## 2019-04-18 ENCOUNTER — APPOINTMENT (OUTPATIENT)
Dept: LAB | Facility: HOSPITAL | Age: 59
End: 2019-04-18
Payer: COMMERCIAL

## 2019-04-18 ENCOUNTER — OFFICE VISIT (OUTPATIENT)
Dept: FAMILY MEDICINE CLINIC | Facility: CLINIC | Age: 59
End: 2019-04-18
Payer: COMMERCIAL

## 2019-04-18 VITALS
HEIGHT: 61 IN | BODY MASS INDEX: 33.42 KG/M2 | SYSTOLIC BLOOD PRESSURE: 150 MMHG | OXYGEN SATURATION: 97 % | TEMPERATURE: 99 F | WEIGHT: 177 LBS | HEART RATE: 96 BPM | DIASTOLIC BLOOD PRESSURE: 72 MMHG

## 2019-04-18 DIAGNOSIS — R93.7 ABNORMAL MRI, LUMBAR SPINE: ICD-10-CM

## 2019-04-18 DIAGNOSIS — R10.9 ABDOMINAL CRAMPING: ICD-10-CM

## 2019-04-18 DIAGNOSIS — M51.36 DEGENERATION OF LUMBAR INTERVERTEBRAL DISC: ICD-10-CM

## 2019-04-18 DIAGNOSIS — R74.8 ABNORMAL LIVER ENZYMES: ICD-10-CM

## 2019-04-18 DIAGNOSIS — R14.0 BLOATING: ICD-10-CM

## 2019-04-18 DIAGNOSIS — M51.36 DEGENERATION OF LUMBAR INTERVERTEBRAL DISC: Primary | ICD-10-CM

## 2019-04-18 PROCEDURE — 86663 EPSTEIN-BARR ANTIBODY: CPT

## 2019-04-18 PROCEDURE — 86705 HEP B CORE ANTIBODY IGM: CPT

## 2019-04-18 PROCEDURE — 86200 CCP ANTIBODY: CPT

## 2019-04-18 PROCEDURE — 86704 HEP B CORE ANTIBODY TOTAL: CPT

## 2019-04-18 PROCEDURE — 99214 OFFICE O/P EST MOD 30 MIN: CPT | Performed by: PHYSICIAN ASSISTANT

## 2019-04-18 PROCEDURE — 36415 COLL VENOUS BLD VENIPUNCTURE: CPT

## 2019-04-18 PROCEDURE — 86430 RHEUMATOID FACTOR TEST QUAL: CPT

## 2019-04-18 PROCEDURE — 87340 HEPATITIS B SURFACE AG IA: CPT

## 2019-04-18 PROCEDURE — 86803 HEPATITIS C AB TEST: CPT

## 2019-04-18 PROCEDURE — 86038 ANTINUCLEAR ANTIBODIES: CPT

## 2019-04-18 PROCEDURE — 86664 EPSTEIN-BARR NUCLEAR ANTIGEN: CPT

## 2019-04-18 PROCEDURE — 86665 EPSTEIN-BARR CAPSID VCA: CPT

## 2019-04-18 PROCEDURE — 3008F BODY MASS INDEX DOCD: CPT | Performed by: PHYSICIAN ASSISTANT

## 2019-04-18 RX ORDER — CYCLOBENZAPRINE HCL 5 MG
TABLET ORAL
COMMUNITY
End: 2019-10-03 | Stop reason: ALTCHOICE

## 2019-04-18 RX ORDER — METHOCARBAMOL 750 MG/1
750 TABLET, FILM COATED ORAL 2 TIMES DAILY
Refills: 1 | COMMUNITY
Start: 2019-04-10 | End: 2020-03-26 | Stop reason: ALTCHOICE

## 2019-04-19 ENCOUNTER — HOSPITAL ENCOUNTER (OUTPATIENT)
Dept: RADIOLOGY | Age: 59
Discharge: HOME | End: 2019-04-19
Payer: COMMERCIAL

## 2019-04-19 DIAGNOSIS — R74.8 ABNORMAL LIVER ENZYMES: ICD-10-CM

## 2019-04-19 DIAGNOSIS — R10.9 ABDOMINAL CRAMPING: ICD-10-CM

## 2019-04-19 DIAGNOSIS — R14.0 BLOATING: ICD-10-CM

## 2019-04-19 LAB
EBV EA IGG SER-ACNC: <9 U/ML (ref 0–8.9)
EBV NA IGG SER IA-ACNC: 149 U/ML (ref 0–17.9)
EBV PATRN SPEC IB-IMP: ABNORMAL
EBV VCA IGG SER IA-ACNC: 230 U/ML (ref 0–17.9)
EBV VCA IGM SER IA-ACNC: <36 U/ML (ref 0–35.9)
HBV CORE AB SER QL: NORMAL
HBV CORE IGM SER QL: NORMAL
HBV SURFACE AG SER QL: NORMAL
HCV AB SER QL: NORMAL
RHEUMATOID FACT SER QL LA: NEGATIVE

## 2019-04-19 PROCEDURE — 76856 US EXAM PELVIC COMPLETE: CPT

## 2019-04-19 PROCEDURE — 76830 TRANSVAGINAL US NON-OB: CPT

## 2019-04-19 PROCEDURE — 76705 ECHO EXAM OF ABDOMEN: CPT

## 2019-04-20 LAB — CCP IGA+IGG SERPL IA-ACNC: 11 UNITS (ref 0–19)

## 2019-04-22 ENCOUNTER — TELEPHONE (OUTPATIENT)
Dept: FAMILY MEDICINE CLINIC | Facility: CLINIC | Age: 59
End: 2019-04-22

## 2019-04-22 LAB — RYE IGE QN: NEGATIVE

## 2019-04-25 ENCOUNTER — TELEPHONE (OUTPATIENT)
Dept: OTHER | Facility: OTHER | Age: 59
End: 2019-04-25

## 2019-04-29 ENCOUNTER — TELEPHONE (OUTPATIENT)
Dept: FAMILY MEDICINE CLINIC | Facility: CLINIC | Age: 59
End: 2019-04-29

## 2019-04-29 DIAGNOSIS — R10.9 ABDOMINAL CRAMPING: Primary | ICD-10-CM

## 2019-05-10 ENCOUNTER — TELEPHONE (OUTPATIENT)
Dept: FAMILY MEDICINE CLINIC | Facility: CLINIC | Age: 59
End: 2019-05-10

## 2019-05-30 ENCOUNTER — OFFICE VISIT (OUTPATIENT)
Dept: GASTROENTEROLOGY | Facility: CLINIC | Age: 59
End: 2019-05-30
Payer: COMMERCIAL

## 2019-05-30 VITALS
BODY MASS INDEX: 32.95 KG/M2 | SYSTOLIC BLOOD PRESSURE: 124 MMHG | DIASTOLIC BLOOD PRESSURE: 70 MMHG | WEIGHT: 174.38 LBS | HEART RATE: 101 BPM

## 2019-05-30 DIAGNOSIS — R79.89 ELEVATED LFTS: Primary | ICD-10-CM

## 2019-05-30 DIAGNOSIS — K76.0 HEPATIC STEATOSIS: ICD-10-CM

## 2019-05-30 DIAGNOSIS — K58.0 IRRITABLE BOWEL SYNDROME WITH DIARRHEA: ICD-10-CM

## 2019-05-30 DIAGNOSIS — R10.9 ABDOMINAL CRAMPING: ICD-10-CM

## 2019-05-30 PROCEDURE — 99214 OFFICE O/P EST MOD 30 MIN: CPT | Performed by: PHYSICIAN ASSISTANT

## 2019-05-30 RX ORDER — DICYCLOMINE HCL 20 MG
20 TABLET ORAL EVERY 6 HOURS
Qty: 30 TABLET | Refills: 2 | Status: SHIPPED | OUTPATIENT
Start: 2019-05-30 | End: 2020-03-26 | Stop reason: ALTCHOICE

## 2019-06-13 ENCOUNTER — APPOINTMENT (OUTPATIENT)
Dept: LAB | Facility: HOSPITAL | Age: 59
End: 2019-06-13
Payer: COMMERCIAL

## 2019-06-13 DIAGNOSIS — K76.0 HEPATIC STEATOSIS: ICD-10-CM

## 2019-06-13 DIAGNOSIS — R79.89 ELEVATED LFTS: ICD-10-CM

## 2019-06-13 LAB
CHOLEST SERPL-MCNC: 243 MG/DL (ref 50–200)
EST. AVERAGE GLUCOSE BLD GHB EST-MCNC: 123 MG/DL
FERRITIN SERPL-MCNC: 134 NG/ML (ref 8–388)
GGT SERPL-CCNC: 156 U/L (ref 5–85)
HBA1C MFR BLD: 5.9 % (ref 4.2–6.3)
HDLC SERPL-MCNC: 32 MG/DL (ref 40–60)
IRON SATN MFR SERPL: 34 %
IRON SERPL-MCNC: 103 UG/DL (ref 50–170)
LDLC SERPL CALC-MCNC: 167 MG/DL (ref 0–100)
NONHDLC SERPL-MCNC: 211 MG/DL
TIBC SERPL-MCNC: 305 UG/DL (ref 250–450)
TRIGL SERPL-MCNC: 219 MG/DL

## 2019-06-13 PROCEDURE — 83036 HEMOGLOBIN GLYCOSYLATED A1C: CPT

## 2019-06-13 PROCEDURE — 82103 ALPHA-1-ANTITRYPSIN TOTAL: CPT

## 2019-06-13 PROCEDURE — 86256 FLUORESCENT ANTIBODY TITER: CPT

## 2019-06-13 PROCEDURE — 82728 ASSAY OF FERRITIN: CPT

## 2019-06-13 PROCEDURE — 83540 ASSAY OF IRON: CPT

## 2019-06-13 PROCEDURE — 83550 IRON BINDING TEST: CPT

## 2019-06-13 PROCEDURE — 86235 NUCLEAR ANTIGEN ANTIBODY: CPT

## 2019-06-13 PROCEDURE — 36415 COLL VENOUS BLD VENIPUNCTURE: CPT

## 2019-06-13 PROCEDURE — 80061 LIPID PANEL: CPT

## 2019-06-13 PROCEDURE — 82977 ASSAY OF GGT: CPT

## 2019-06-13 PROCEDURE — 82390 ASSAY OF CERULOPLASMIN: CPT

## 2019-06-14 ENCOUNTER — TELEPHONE (OUTPATIENT)
Dept: GASTROENTEROLOGY | Facility: CLINIC | Age: 59
End: 2019-06-14

## 2019-06-14 LAB
A1AT SERPL-MCNC: 125 MG/DL (ref 90–200)
ACTIN IGG SERPL-ACNC: 9 UNITS (ref 0–19)
CERULOPLASMIN SERPL-MCNC: 32.1 MG/DL (ref 19–39)
MITOCHONDRIA M2 IGG SER-ACNC: <20 UNITS (ref 0–20)

## 2019-06-27 ENCOUNTER — APPOINTMENT (OUTPATIENT)
Dept: LAB | Facility: MEDICAL CENTER | Age: 59
End: 2019-06-27
Payer: COMMERCIAL

## 2019-06-27 ENCOUNTER — TRANSCRIBE ORDERS (OUTPATIENT)
Dept: ADMINISTRATIVE | Facility: HOSPITAL | Age: 59
End: 2019-06-27

## 2019-06-27 DIAGNOSIS — M79.10 MYALGIA: ICD-10-CM

## 2019-06-27 DIAGNOSIS — M54.5 LOW BACK PAIN, UNSPECIFIED BACK PAIN LATERALITY, UNSPECIFIED CHRONICITY, WITH SCIATICA PRESENCE UNSPECIFIED: ICD-10-CM

## 2019-06-27 DIAGNOSIS — M05.20 RHEUMATOID ARTERITIS (HCC): ICD-10-CM

## 2019-06-27 DIAGNOSIS — M54.5 LOW BACK PAIN, UNSPECIFIED BACK PAIN LATERALITY, UNSPECIFIED CHRONICITY, WITH SCIATICA PRESENCE UNSPECIFIED: Primary | ICD-10-CM

## 2019-06-27 LAB
25(OH)D3 SERPL-MCNC: 21.4 NG/ML (ref 30–100)
ALBUMIN SERPL BCP-MCNC: 4.1 G/DL (ref 3.5–5)
ALP SERPL-CCNC: 154 U/L (ref 46–116)
ALT SERPL W P-5'-P-CCNC: 49 U/L (ref 12–78)
ANION GAP SERPL CALCULATED.3IONS-SCNC: 6 MMOL/L (ref 4–13)
AST SERPL W P-5'-P-CCNC: 22 U/L (ref 5–45)
BASOPHILS # BLD AUTO: 0.05 THOUSANDS/ΜL (ref 0–0.1)
BASOPHILS NFR BLD AUTO: 1 % (ref 0–1)
BILIRUB SERPL-MCNC: 0.26 MG/DL (ref 0.2–1)
BUN SERPL-MCNC: 13 MG/DL (ref 5–25)
CALCIUM SERPL-MCNC: 9.6 MG/DL (ref 8.3–10.1)
CHLORIDE SERPL-SCNC: 111 MMOL/L (ref 100–108)
CO2 SERPL-SCNC: 26 MMOL/L (ref 21–32)
CREAT SERPL-MCNC: 0.78 MG/DL (ref 0.6–1.3)
CRP SERPL QL: 4.2 MG/L
EOSINOPHIL # BLD AUTO: 0.14 THOUSAND/ΜL (ref 0–0.61)
EOSINOPHIL NFR BLD AUTO: 2 % (ref 0–6)
ERYTHROCYTE [DISTWIDTH] IN BLOOD BY AUTOMATED COUNT: 13.4 % (ref 11.6–15.1)
ERYTHROCYTE [SEDIMENTATION RATE] IN BLOOD: 18 MM/HOUR (ref 0–20)
GFR SERPL CREATININE-BSD FRML MDRD: 85 ML/MIN/1.73SQ M
GLUCOSE P FAST SERPL-MCNC: 99 MG/DL (ref 65–99)
HCT VFR BLD AUTO: 44.3 % (ref 34.8–46.1)
HGB BLD-MCNC: 13.6 G/DL (ref 11.5–15.4)
IMM GRANULOCYTES # BLD AUTO: 0.02 THOUSAND/UL (ref 0–0.2)
IMM GRANULOCYTES NFR BLD AUTO: 0 % (ref 0–2)
LYMPHOCYTES # BLD AUTO: 3.2 THOUSANDS/ΜL (ref 0.6–4.47)
LYMPHOCYTES NFR BLD AUTO: 41 % (ref 14–44)
MCH RBC QN AUTO: 31.4 PG (ref 26.8–34.3)
MCHC RBC AUTO-ENTMCNC: 30.7 G/DL (ref 31.4–37.4)
MCV RBC AUTO: 102 FL (ref 82–98)
MONOCYTES # BLD AUTO: 0.48 THOUSAND/ΜL (ref 0.17–1.22)
MONOCYTES NFR BLD AUTO: 6 % (ref 4–12)
NEUTROPHILS # BLD AUTO: 3.93 THOUSANDS/ΜL (ref 1.85–7.62)
NEUTS SEG NFR BLD AUTO: 50 % (ref 43–75)
NRBC BLD AUTO-RTO: 0 /100 WBCS
PLATELET # BLD AUTO: 264 THOUSANDS/UL (ref 149–390)
PMV BLD AUTO: 11.2 FL (ref 8.9–12.7)
POTASSIUM SERPL-SCNC: 4.7 MMOL/L (ref 3.5–5.3)
PROT SERPL-MCNC: 7.2 G/DL (ref 6.4–8.2)
RBC # BLD AUTO: 4.33 MILLION/UL (ref 3.81–5.12)
SODIUM SERPL-SCNC: 143 MMOL/L (ref 136–145)
TSH SERPL DL<=0.05 MIU/L-ACNC: 0.79 UIU/ML (ref 0.36–3.74)
WBC # BLD AUTO: 7.82 THOUSAND/UL (ref 4.31–10.16)

## 2019-06-27 PROCEDURE — 84443 ASSAY THYROID STIM HORMONE: CPT

## 2019-06-27 PROCEDURE — 86235 NUCLEAR ANTIGEN ANTIBODY: CPT

## 2019-06-27 PROCEDURE — 86140 C-REACTIVE PROTEIN: CPT

## 2019-06-27 PROCEDURE — 85652 RBC SED RATE AUTOMATED: CPT

## 2019-06-27 PROCEDURE — 80053 COMPREHEN METABOLIC PANEL: CPT

## 2019-06-27 PROCEDURE — 85025 COMPLETE CBC W/AUTO DIFF WBC: CPT

## 2019-06-27 PROCEDURE — 81374 HLA I TYPING 1 ANTIGEN LR: CPT

## 2019-06-27 PROCEDURE — 82306 VITAMIN D 25 HYDROXY: CPT

## 2019-06-27 PROCEDURE — 36415 COLL VENOUS BLD VENIPUNCTURE: CPT

## 2019-06-28 LAB
ENA SS-A AB SER-ACNC: <0.2 AI (ref 0–0.9)
ENA SS-B AB SER-ACNC: <0.2 AI (ref 0–0.9)

## 2019-07-02 LAB — HLA-B27 QL NAA+PROBE: NEGATIVE

## 2019-10-02 DIAGNOSIS — R05.9 COUGH: ICD-10-CM

## 2019-10-02 DIAGNOSIS — M62.838 MUSCLE SPASM: ICD-10-CM

## 2019-10-03 RX ORDER — ORPHENADRINE CITRATE 100 MG/1
100 TABLET, EXTENDED RELEASE ORAL 2 TIMES DAILY PRN
Qty: 180 TABLET | Refills: 0 | Status: SHIPPED | OUTPATIENT
Start: 2019-10-03 | End: 2020-03-26 | Stop reason: SDUPTHER

## 2019-10-03 RX ORDER — PROMETHAZINE HYDROCHLORIDE AND CODEINE PHOSPHATE 6.25; 1 MG/5ML; MG/5ML
5 SYRUP ORAL EVERY 4 HOURS PRN
Qty: 120 ML | Refills: 0 | Status: SHIPPED | OUTPATIENT
Start: 2019-10-03 | End: 2020-09-16 | Stop reason: SDUPTHER

## 2019-11-08 DIAGNOSIS — J30.1 CHRONIC SEASONAL ALLERGIC RHINITIS DUE TO POLLEN: ICD-10-CM

## 2019-11-08 RX ORDER — LEVOCETIRIZINE DIHYDROCHLORIDE 5 MG/1
5 TABLET, FILM COATED ORAL DAILY
Qty: 90 TABLET | Refills: 5 | Status: SHIPPED | OUTPATIENT
Start: 2019-11-08 | End: 2020-09-17 | Stop reason: SDUPTHER

## 2019-11-18 DIAGNOSIS — N32.81 OAB (OVERACTIVE BLADDER): ICD-10-CM

## 2019-11-18 RX ORDER — FESOTERODINE FUMARATE 4 MG/1
1 TABLET, EXTENDED RELEASE ORAL DAILY
Qty: 30 TABLET | Refills: 5 | Status: SHIPPED | OUTPATIENT
Start: 2019-11-18 | End: 2020-05-18

## 2020-01-07 ENCOUNTER — TELEPHONE (OUTPATIENT)
Dept: OTHER | Facility: OTHER | Age: 60
End: 2020-01-07

## 2020-01-07 NOTE — TELEPHONE ENCOUNTER
Per patient she can't keep anything down and is unable to count how many loose stools she said its uncontrolled that's all she's having  Patient aware of Milagros recommendation and Patient states she should go to the ER and pay 75$ for them to tell her she should follow up with her PCP and patient states this is ridiculous and she will find a new PCP and hung up  Please advise

## 2020-01-07 NOTE — TELEPHONE ENCOUNTER
If pt is able to keep some fluids down she can come in here   If not keeping anything down longer than 20 minutes and more than 6 loose stools a day go to ER

## 2020-03-02 DIAGNOSIS — E03.9 ACQUIRED HYPOTHYROIDISM: ICD-10-CM

## 2020-03-02 RX ORDER — LEVOTHYROXINE SODIUM 0.05 MG/1
TABLET ORAL
Qty: 90 TABLET | Refills: 1 | Status: SHIPPED | OUTPATIENT
Start: 2020-03-02 | End: 2020-07-01 | Stop reason: SDUPTHER

## 2020-03-02 NOTE — TELEPHONE ENCOUNTER
Requested medication(s) are due for refill today: Yes  Patient has already received a courtesy refill: No  Other reason request has been forwarded to provider:     TSH in normal range and within 360 days

## 2020-03-18 DIAGNOSIS — M62.838 MUSCLE SPASM: ICD-10-CM

## 2020-03-18 RX ORDER — ORPHENADRINE CITRATE 100 MG/1
TABLET, EXTENDED RELEASE ORAL
Qty: 60 TABLET | Refills: 2 | OUTPATIENT
Start: 2020-03-18

## 2020-03-26 ENCOUNTER — TELEMEDICINE (OUTPATIENT)
Dept: FAMILY MEDICINE CLINIC | Facility: CLINIC | Age: 60
End: 2020-03-26
Payer: COMMERCIAL

## 2020-03-26 DIAGNOSIS — G89.29 CHRONIC BILATERAL LOW BACK PAIN WITHOUT SCIATICA: ICD-10-CM

## 2020-03-26 DIAGNOSIS — J45.20 MILD INTERMITTENT ASTHMA WITHOUT COMPLICATION: ICD-10-CM

## 2020-03-26 DIAGNOSIS — M54.50 CHRONIC BILATERAL LOW BACK PAIN WITHOUT SCIATICA: ICD-10-CM

## 2020-03-26 DIAGNOSIS — M62.838 MUSCLE SPASM: ICD-10-CM

## 2020-03-26 PROCEDURE — 99214 OFFICE O/P EST MOD 30 MIN: CPT | Performed by: PHYSICIAN ASSISTANT

## 2020-03-26 RX ORDER — TRAMADOL HYDROCHLORIDE 50 MG/1
50 TABLET ORAL EVERY 8 HOURS PRN
Qty: 90 TABLET | Refills: 0 | Status: SHIPPED | OUTPATIENT
Start: 2020-03-26 | End: 2020-08-13

## 2020-03-26 RX ORDER — ORPHENADRINE CITRATE 100 MG/1
100 TABLET, EXTENDED RELEASE ORAL 2 TIMES DAILY PRN
Qty: 180 TABLET | Refills: 0 | OUTPATIENT
Start: 2020-03-26

## 2020-03-26 RX ORDER — ORPHENADRINE CITRATE 100 MG/1
100 TABLET, EXTENDED RELEASE ORAL 2 TIMES DAILY PRN
Qty: 180 TABLET | Refills: 1 | Status: SHIPPED | OUTPATIENT
Start: 2020-03-26 | End: 2020-09-17 | Stop reason: SDUPTHER

## 2020-03-26 NOTE — TELEPHONE ENCOUNTER
Patient is scheduled for this afternoon for a video visit  She is currently downloading the duo elis

## 2020-03-26 NOTE — PROGRESS NOTES
Virtual Regular Visit    Problem List Items Addressed This Visit     None      Visit Diagnoses     Chronic bilateral low back pain without sciatica        Relevant Medications    traMADol (ULTRAM) 50 mg tablet    Muscle spasm        Relevant Medications    orphenadrine (NORFLEX) 100 mg tablet    Mild intermittent asthma without complication        Relevant Medications    Albuterol Sulfate (ProAir RespiClick) 565 (90 Base) MCG/ACT AEPB               Reason for visit is renewal of medication    Encounter provider Deborah Koch PA-C    [facility] [address]  [address] [address]  [phone]      Recent Visits  No visits were found meeting these conditions  Showing recent visits within past 7 days and meeting all other requirements     Future Appointments  No visits were found meeting these conditions  Showing future appointments within next 150 days and meeting all other requirements        After connecting through [employer], the patient was identified by name and date of birth  Gopi Constantino was informed that this is a telemedicine visit and that the visit is being conducted through [employer] and patient was informed that this is not a secure, HIPAA-complaint platform  she agrees to proceed  which may not be secure and therefore, might not be HIPAA-compliant  My office door was closed  No one else was in the room  She acknowledged consent and understanding of privacy and security of the video platform  The patient has agreed to participate and understands they can discontinue the visit at any time  Subjective  Gopi Constantino is a 64 y o  female needs renewal of her medications for her asthma and back pain  Patient denies any other problems or concerns        Past Medical History:   Diagnosis Date    Abnormal mammogram     last assessed 6/28/16    Abrasion     last assessed 9/12/16    Acute viral syndrome     last assessed 12/8/17    C  difficile diarrhea     Chronic cystitis     Disease of thyroid gland     Dysuria     last assessed 12/5/16    Endometriosis     Enteritis     last assessed 11/4/14    Fibromyalgia     Gross hematuria     Hypercholesterolemia     resolved 10/29/14    Photophobia     last assessed 9/29/16    Snake bite     last assessed 9/14/15       Past Surgical History:   Procedure Laterality Date    CARPAL TUNNEL RELEASE      CARPAL TUNNEL RELEASE      CHOLECYSTECTOMY      COLONOSCOPY  04/29/2010    ENDOMETRIAL ABLATION      HAND SURGERY Right 08/13/2018    cyst removal    HYSTEROSCOPY W/ ENDOMETRIAL ABLATION      KNEE RECONSTRUCTION, MEDIAL PATELLAR FEMORAL LIGAMENT Bilateral     screws removed    KNEE SURGERY Left     LAPAROSCOPY      OVARIAN CYST SURGERY      PLANTAR FASCIECTOMY      left calf    CO COLONOSCOPY FLX DX W/COLLJ SPEC WHEN PFRMD N/A 7/11/2018    Procedure: COLONOSCOPY;  Surgeon: Loren Hernandez MD;  Location: [facility];   Service: Gastroenterology    SHOULDER SURGERY Right     and stem cell surgery on shoulder        Current Outpatient Medications   Medication Sig Dispense Refill    Fesoterodine Fumarate ER (TOVIAZ) 4 MG TB24 Take 1 tablet (4 mg total) by mouth daily 30 tablet 5    levocetirizine (XYZAL) 5 MG tablet Take 1 tablet (5 mg total) by mouth daily 90 tablet 5    levothyroxine 50 mcg tablet TAKE 1 TABLET BY MOUTH EVERY DAY 90 tablet 1    orphenadrine (NORFLEX) 100 mg tablet Take 1 tablet (100 mg total) by mouth 2 (two) times a day as needed for muscle spasms 180 tablet 1    promethazine-codeine (PHENERGAN WITH CODEINE) 6 25-10 mg/5 mL syrup Take 5 mL by mouth every 4 (four) hours as needed for cough 120 mL 0    traMADol (ULTRAM) 50 mg tablet Take 1 tablet (50 mg total) by mouth every 8 (eight) hours as needed for moderate pain 90 tablet 0    VITAMIN B COMPLEX-C PO Take by mouth      Albuterol Sulfate (ProAir RespiClick) 601 (90 Base) MCG/ACT AEPB Inhale 2 puffs 4 (four) times a day as needed (wheezing) 3 each 1    aspirin 81 MG tablet Take 1 tablet by mouth daily      cholecalciferol (VITAMIN D3) 1,000 units tablet Take by mouth      NON FORMULARY       Probiotic Product (PROBIOTIC-10) CAPS Take by mouth        No current facility-administered medications for this visit  Allergies   Allergen Reactions    Aminosyn Anaphylaxis    Contrast Dye  [Iodinated Diagnostic Agents] Anaphylaxis    Iohexol Anaphylaxis    Procalamine Anaphylaxis    Dairy Aid [Lactase]      intolerence    Fentanyl Other (See Comments) and Irritability     Skin crawling      Morphine Other (See Comments) and Irritability     Skin crawling     Nsaids GI Intolerance    Amoxicillin Hives    Avocado      Gi intolerence    Cefuroxime Hives    Ciprocinonide [Fluocinolone] Hives    Ciprofloxacin Hives    Codeine      Skin crawling    Eggs Or Egg-Derived Products GI Bleeding     Stomach pains    Erythromycin Hives    Gluten Meal     Macrobid [Nitrofurantoin] Hives    Other     Sulfa Antibiotics Hives    Sumatriptan Hives and Other (See Comments)     Diff breathing    Wheat Bran     Zithromax [Azithromycin] Hives       Review of Systems   Constitutional: Negative for activity change, appetite change, chills and fever  HENT: Negative for congestion, postnasal drip and trouble swallowing  Respiratory: Negative for cough, chest tightness and shortness of breath  Cardiovascular: Negative for chest pain  Gastrointestinal: Negative for abdominal pain  Musculoskeletal: Positive for back pain  Negative for gait problem  Neurological: Negative for dizziness, light-headedness and headaches  Psychiatric/Behavioral: Negative  Physical Exam   Constitutional: She is oriented to person, place, and time  She appears well-developed and well-nourished  HENT:   Head: Normocephalic     Eyes: Conjunctivae are normal  Pulmonary/Chest: Effort normal    Neurological: She is alert and oriented to person, place, and time  Psychiatric: She has a normal mood and affect  Her behavior is normal  Judgment and thought content normal         I spent 10 minutes with the patient during this visit

## 2020-05-16 DIAGNOSIS — N32.81 OAB (OVERACTIVE BLADDER): ICD-10-CM

## 2020-05-18 RX ORDER — FESOTERODINE FUMARATE 4 MG/1
TABLET, FILM COATED, EXTENDED RELEASE ORAL
Qty: 90 TABLET | Refills: 1 | Status: SHIPPED | OUTPATIENT
Start: 2020-05-18 | End: 2020-09-17 | Stop reason: SDUPTHER

## 2020-07-01 DIAGNOSIS — E03.9 ACQUIRED HYPOTHYROIDISM: ICD-10-CM

## 2020-07-01 RX ORDER — LEVOTHYROXINE SODIUM 0.05 MG/1
50 TABLET ORAL DAILY
Qty: 90 TABLET | Refills: 1 | Status: SHIPPED | OUTPATIENT
Start: 2020-07-01 | End: 2020-09-17 | Stop reason: SDUPTHER

## 2020-07-01 NOTE — TELEPHONE ENCOUNTER
Requested medication(s) are due for refill today: Yes  Patient has already received a courtesy refill:no  Other reason request has been forwarded to provider: No recent labs

## 2020-08-13 DIAGNOSIS — G89.29 CHRONIC BILATERAL LOW BACK PAIN WITHOUT SCIATICA: ICD-10-CM

## 2020-08-13 DIAGNOSIS — M54.50 CHRONIC BILATERAL LOW BACK PAIN WITHOUT SCIATICA: ICD-10-CM

## 2020-08-13 RX ORDER — TRAMADOL HYDROCHLORIDE 50 MG/1
50 TABLET ORAL EVERY 8 HOURS PRN
Qty: 90 TABLET | Refills: 0 | Status: SHIPPED | OUTPATIENT
Start: 2020-08-13 | End: 2021-01-26 | Stop reason: SDUPTHER

## 2020-08-13 NOTE — TELEPHONE ENCOUNTER
Medication:  PDMP   03/26/2020  1  03/26/2020  TRAMADOL HCL 50 MG TABLET  90 0  30  [place] CRU  [id number]  PENNS ([id number])  0  15 0 MME  [place] Ins  [place]    01/22/2020  1  01/22/2020  ACETAMINOPHEN-COD #3 TABLET  8 0  3  ST FEN  [id number]  PENNS ([id number])  0  12 0 MME  [place] Ins  [place]    10/03/2019  1  10/03/2019  PROMETHAZINE-CODEINE SYRUP  120 0  4  PA CRU  [id number]  PENNS ([id number])  0  9 0 MME  [place] Ins  [place]       Active agreement on file -No      Milagros's pt  Milagros is out of the office

## 2020-08-23 NOTE — ANESTHESIA POSTPROCEDURE EVALUATION
Post-Op Assessment Note      CV Status:  Stable    Mental Status:  Somnolent    Hydration Status:  Stable    PONV Controlled:  None    Airway Patency:  Patent    Post Op Vitals Reviewed: Yes          Staff: CRNA           BP   140/63   Temp     Pulse  76   Resp   23   SpO2   98 No

## 2020-09-16 ENCOUNTER — TELEMEDICINE (OUTPATIENT)
Dept: FAMILY MEDICINE CLINIC | Facility: CLINIC | Age: 60
End: 2020-09-16
Payer: COMMERCIAL

## 2020-09-16 VITALS — TEMPERATURE: 98.7 F | WEIGHT: 164 LBS | BODY MASS INDEX: 30.99 KG/M2

## 2020-09-16 DIAGNOSIS — R53.83 FATIGUE, UNSPECIFIED TYPE: ICD-10-CM

## 2020-09-16 DIAGNOSIS — J30.1 CHRONIC SEASONAL ALLERGIC RHINITIS DUE TO POLLEN: ICD-10-CM

## 2020-09-16 DIAGNOSIS — M62.838 MUSCLE SPASM: ICD-10-CM

## 2020-09-16 DIAGNOSIS — M79.10 MYALGIA: Primary | ICD-10-CM

## 2020-09-16 DIAGNOSIS — E03.9 ACQUIRED HYPOTHYROIDISM: ICD-10-CM

## 2020-09-16 DIAGNOSIS — J02.9 SORE THROAT: ICD-10-CM

## 2020-09-16 DIAGNOSIS — M79.10 MYALGIA: ICD-10-CM

## 2020-09-16 DIAGNOSIS — R05.9 COUGH: ICD-10-CM

## 2020-09-16 DIAGNOSIS — N32.81 OAB (OVERACTIVE BLADDER): ICD-10-CM

## 2020-09-16 PROCEDURE — 99214 OFFICE O/P EST MOD 30 MIN: CPT | Performed by: STUDENT IN AN ORGANIZED HEALTH CARE EDUCATION/TRAINING PROGRAM

## 2020-09-16 PROCEDURE — U0003 INFECTIOUS AGENT DETECTION BY NUCLEIC ACID (DNA OR RNA); SEVERE ACUTE RESPIRATORY SYNDROME CORONAVIRUS 2 (SARS-COV-2) (CORONAVIRUS DISEASE [COVID-19]), AMPLIFIED PROBE TECHNIQUE, MAKING USE OF HIGH THROUGHPUT TECHNOLOGIES AS DESCRIBED BY CMS-2020-01-R: HCPCS | Performed by: STUDENT IN AN ORGANIZED HEALTH CARE EDUCATION/TRAINING PROGRAM

## 2020-09-16 NOTE — TELEPHONE ENCOUNTER
Medication:  PDMP reviewed  Active agreement on file -No      08/13/2020  1  08/13/2020  TRAMADOL HCL 50 MG TABLET  90 0  30  MARGAUX ROBBIN  [id number]  PENNS ([id number])  0  15 0 MME  [place]    03/26/2020  1  03/26/2020  TRAMADOL HCL 50 MG TABLET  90 0  30  PA CRU  [id number]  PENNS ([id number])  0  15 0 MME  [place] Ins  [place]    01/22/2020  1  01/22/2020  ACETAMINOPHEN-COD #3 TABLET  8 0  3  ST FEN  [id number]  PENNS ([id number])  0  12 0 MME  [place]    10/03/2019  1  10/03/2019  PROMETHAZINE-CODEINE SYRUP  120 0  4  [place] CRU  [id number]  PENNS ([id number])  0  9 0 MME  [place]

## 2020-09-16 NOTE — PROGRESS NOTES
COVID-19 Virtual Visit     Assessment/Plan:    Problem List Items Addressed This Visit     None      Visit Diagnoses     Myalgia    -  Primary    Relevant Orders    Novel Coronavirus (COVID-19), PCR [facility] - Collected at [facility] or [facility]    Fatigue, unspecified type        Relevant Orders    Novel Coronavirus (COVID-19), PCR [facility] - Collected at [facility]    Sore throat        Relevant Orders    Novel Coronavirus (COVID-19), PCR [facility] - Collected at [employer] or [facility]        This virtual check-in was done via [employer] and patient was informed that this is not a secure, HIPAA-complaint platform  She agrees to proceed   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    Disposition:      I referred Danielle Brenner to one of our centralized sites for a COVID-19 swab    I spent 15 minutes directly with the patient during this visit    Encounter provider Naeem Diaz MD    Provider [facility] [address]  [facility] [place] [id number] [phone]    Recent Visits  No visits were found meeting these conditions  Showing recent visits within past 7 days and meeting all other requirements     Today's Visits  Date Type Provider Dept   09/16/20 Telemedicine Naeem Diaz MD [facility] today's visits and meeting all other requirements     Future Appointments  No visits were found meeting these conditions  Showing future appointments within next 150 days and meeting all other requirements        Patient agrees to participate in a virtual check in via telephone or video visit instead of presenting to the office to address urgent/immediate medical needs  Patient is aware this is a billable service  After connecting through [employer], the patient was identified by name and date of birth   Kayleen Barnes was informed that this was a telemedicine visit and that the exam was being conducted confidentially over secure lines  My office door was closed  No one else was in the room  Telma Hansen acknowledged consent and understanding of privacy and security of the telemedicine visit  I informed the patient that I have reviewed her record in Epic and presented the opportunity for her to ask any questions regarding the visit today  The patient agreed to participate  Subjective  Telma Hansen is a 64 y o  female who is concerned about COVID-19  She reports chills, cough, shortness of breath, headache and sore throat  She has not experienced abdominal pain, diarrhea and vomiting She has not had contact with a person who is under investigation for or who is positive for COVID-19 within the last 14 days  She has not been hospitalized recently for fever and/or lower respiratory symptoms      Past Medical History:   Diagnosis Date    Abnormal mammogram     last assessed 6/28/16    Abrasion     last assessed 9/12/16    Acute viral syndrome     last assessed 12/8/17    C  difficile diarrhea     Chronic cystitis     Disease of thyroid gland     Dysuria     last assessed 12/5/16    Endometriosis     Enteritis     last assessed 11/4/14    Fibromyalgia     Gross hematuria     Hypercholesterolemia     resolved 10/29/14    Photophobia     last assessed 9/29/16    Snake bite     last assessed 9/14/15       Past Surgical History:   Procedure Laterality Date    CARPAL TUNNEL RELEASE      CARPAL TUNNEL RELEASE      CHOLECYSTECTOMY      COLONOSCOPY  04/29/2010    ENDOMETRIAL ABLATION      HAND SURGERY Right 08/13/2018    cyst removal    HYSTEROSCOPY W/ ENDOMETRIAL ABLATION      KNEE RECONSTRUCTION, MEDIAL PATELLAR FEMORAL LIGAMENT Bilateral     screws removed    KNEE SURGERY Left     LAPAROSCOPY      OVARIAN CYST SURGERY      PLANTAR FASCIECTOMY      left calf    PA COLONOSCOPY FLX DX W/COLLJ SPEC WHEN PFRMD N/A 7/11/2018    Procedure: COLONOSCOPY;  Surgeon: Kavitha Castro MD;  Location: [facility]; Service: Gastroenterology    SHOULDER SURGERY Right     and stem cell surgery on shoulder        Current Outpatient Medications   Medication Sig Dispense Refill    Albuterol Sulfate (ProAir RespiClick) 169 (90 Base) MCG/ACT AEPB Inhale 2 puffs 4 (four) times a day as needed (wheezing) 3 each 1    aspirin 81 MG tablet Take 1 tablet by mouth daily      cholecalciferol (VITAMIN D3) 1,000 units tablet Take by mouth      levocetirizine (XYZAL) 5 MG tablet Take 1 tablet (5 mg total) by mouth daily 90 tablet 5    levothyroxine 50 mcg tablet Take 1 tablet (50 mcg total) by mouth daily 90 tablet 1    NON FORMULARY       orphenadrine (NORFLEX) 100 mg tablet Take 1 tablet (100 mg total) by mouth 2 (two) times a day as needed for muscle spasms 180 tablet 1    Probiotic Product (PROBIOTIC-10) CAPS Take by mouth       promethazine-codeine (PHENERGAN WITH CODEINE) 6 25-10 mg/5 mL syrup Take 5 mL by mouth every 4 (four) hours as needed for cough 120 mL 0    TOVIAZ 4 MG TB24 TAKE 1 TABLET BY MOUTH EVERY DAY 90 tablet 1    traMADol (ULTRAM) 50 mg tablet TAKE 1 TABLET (50 MG TOTAL) BY MOUTH EVERY 8 (EIGHT) HOURS AS NEEDED FOR MODERATE PAIN 90 tablet 0    VITAMIN B COMPLEX-C PO Take by mouth       No current facility-administered medications for this visit           Allergies   Allergen Reactions    Aminosyn Anaphylaxis    Contrast Dye  [Iodinated Diagnostic Agents] Anaphylaxis    Iohexol Anaphylaxis    Procalamine Anaphylaxis    Dairy Aid [Lactase]      intolerence    Fentanyl Other (See Comments) and Irritability     Skin crawling      Morphine Other (See Comments) and Irritability     Skin crawling     Nsaids GI Intolerance    Amoxicillin Hives    Avocado      Gi intolerence    Cefuroxime Hives    Ciprocinonide [Fluocinolone] Hives    Ciprofloxacin Hives    Codeine      Skin crawling    Eggs Or Egg-Derived Products GI Bleeding     Stomach pains    Erythromycin Hives    Gluten Meal     Macrobid [Nitrofurantoin] Hives    Other     Sulfa Antibiotics Hives    Sumatriptan Hives and Other (See Comments)     Diff breathing    Wheat Bran     Zithromax [Azithromycin] Hives       Review of Systems   Constitutional: Positive for fatigue  Negative for activity change, appetite change and fever  HENT: Negative for congestion, rhinorrhea and sore throat  Eyes: Negative for visual disturbance  Respiratory: Positive for cough  Negative for shortness of breath  Cardiovascular: Negative for chest pain  Gastrointestinal: Positive for nausea  Negative for vomiting  Musculoskeletal: Positive for arthralgias and myalgias  Video Exam    Vitals:    09/16/20 1448   Temp: 98 7 °F (37 1 °C)   Weight: 74 4 kg (164 lb)         Josette Dow appears no distress  Physical Exam  Constitutional:       Appearance: She is well-developed  She is ill-appearing  HENT:      Head: Normocephalic and atraumatic  Neck:      Musculoskeletal: Normal range of motion  Pulmonary:      Effort: Pulmonary effort is normal    Neurological:      General: No focal deficit present  Mental Status: She is alert  VIRTUAL VISIT [address] acknowledges that she has consented to an online visit or consultation  She understands that the online visit is based solely on information provided by her, and that, in the absence of a face-to-face physical evaluation by the physician, the diagnosis she receives is both limited and provisional in terms of accuracy and completeness  This is not intended to replace a full medical face-to-face evaluation by the physician  Larissa Molina understands and accepts these terms

## 2020-09-17 LAB — SARS-COV-2 RNA SPEC QL NAA+PROBE: NOT DETECTED

## 2020-09-17 RX ORDER — LEVOCETIRIZINE DIHYDROCHLORIDE 5 MG/1
5 TABLET, FILM COATED ORAL DAILY
Qty: 90 TABLET | Refills: 0 | Status: SHIPPED | OUTPATIENT
Start: 2020-09-17 | End: 2020-12-18

## 2020-09-17 RX ORDER — FESOTERODINE FUMARATE 4 MG/1
1 TABLET, FILM COATED, EXTENDED RELEASE ORAL DAILY
Qty: 90 TABLET | Refills: 0 | Status: SHIPPED | OUTPATIENT
Start: 2020-09-17 | End: 2021-04-08

## 2020-09-17 RX ORDER — LEVOTHYROXINE SODIUM 0.05 MG/1
50 TABLET ORAL DAILY
Qty: 90 TABLET | Refills: 0 | Status: SHIPPED | OUTPATIENT
Start: 2020-09-17 | End: 2021-04-19

## 2020-09-17 RX ORDER — ORPHENADRINE CITRATE 100 MG/1
100 TABLET, EXTENDED RELEASE ORAL 2 TIMES DAILY PRN
Qty: 180 TABLET | Refills: 0 | Status: SHIPPED | OUTPATIENT
Start: 2020-09-17 | End: 2020-10-19

## 2020-09-18 RX ORDER — PROMETHAZINE HYDROCHLORIDE AND CODEINE PHOSPHATE 6.25; 1 MG/5ML; MG/5ML
5 SYRUP ORAL EVERY 4 HOURS PRN
Qty: 120 ML | Refills: 0 | Status: SHIPPED | OUTPATIENT
Start: 2020-09-18 | End: 2020-12-09 | Stop reason: SDUPTHER

## 2020-10-02 ENCOUNTER — HOSPITAL ENCOUNTER (EMERGENCY)
Facility: HOSPITAL | Age: 60
Discharge: HOME | End: 2020-10-02
Attending: EMERGENCY MEDICINE | Admitting: EMERGENCY MEDICINE
Payer: COMMERCIAL

## 2020-10-02 ENCOUNTER — APPOINTMENT (EMERGENCY)
Dept: CT IMAGING | Facility: HOSPITAL | Age: 60
End: 2020-10-02
Payer: COMMERCIAL

## 2020-10-02 VITALS
DIASTOLIC BLOOD PRESSURE: 74 MMHG | WEIGHT: 167.33 LBS | TEMPERATURE: 98.1 F | HEART RATE: 80 BPM | HEIGHT: 61 IN | BODY MASS INDEX: 31.59 KG/M2 | RESPIRATION RATE: 16 BRPM | OXYGEN SATURATION: 97 % | SYSTOLIC BLOOD PRESSURE: 145 MMHG

## 2020-10-02 DIAGNOSIS — K58.9 IBS (IRRITABLE BOWEL SYNDROME): Primary | ICD-10-CM

## 2020-10-02 DIAGNOSIS — R19.7 DIARRHEA: ICD-10-CM

## 2020-10-02 LAB
ANION GAP SERPL CALCULATED.3IONS-SCNC: 10 MMOL/L (ref 4–13)
BASOPHILS # BLD AUTO: 0.04 THOUSANDS/ΜL (ref 0–0.1)
BASOPHILS NFR BLD AUTO: 1 % (ref 0–1)
BILIRUB UR QL STRIP: NEGATIVE
BUN SERPL-MCNC: 8 MG/DL (ref 5–25)
CALCIUM SERPL-MCNC: 9.2 MG/DL (ref 8.3–10.1)
CHLORIDE SERPL-SCNC: 107 MMOL/L (ref 100–108)
CLARITY UR: CLEAR
CO2 SERPL-SCNC: 27 MMOL/L (ref 21–32)
COLOR UR: YELLOW
CREAT SERPL-MCNC: 0.78 MG/DL (ref 0.6–1.3)
EOSINOPHIL # BLD AUTO: 0.18 THOUSAND/ΜL (ref 0–0.61)
EOSINOPHIL NFR BLD AUTO: 2 % (ref 0–6)
ERYTHROCYTE [DISTWIDTH] IN BLOOD BY AUTOMATED COUNT: 13.4 % (ref 11.6–15.1)
EXT PREG TEST URINE: NEGATIVE
EXT. CONTROL ED NAV: NORMAL
GFR SERPL CREATININE-BSD FRML MDRD: 84 ML/MIN/1.73SQ M
GLUCOSE SERPL-MCNC: 99 MG/DL (ref 65–140)
GLUCOSE UR STRIP-MCNC: NEGATIVE MG/DL
HCT VFR BLD AUTO: 42.7 % (ref 34.8–46.1)
HGB BLD-MCNC: 13.5 G/DL (ref 11.5–15.4)
HGB UR QL STRIP.AUTO: NEGATIVE
IMM GRANULOCYTES # BLD AUTO: 0.03 THOUSAND/UL (ref 0–0.2)
IMM GRANULOCYTES NFR BLD AUTO: 0 % (ref 0–2)
KETONES UR STRIP-MCNC: NEGATIVE MG/DL
LACTATE SERPL-SCNC: 1.1 MMOL/L (ref 0.5–2)
LEUKOCYTE ESTERASE UR QL STRIP: NEGATIVE
LIPASE SERPL-CCNC: 147 U/L (ref 73–393)
LYMPHOCYTES # BLD AUTO: 3.33 THOUSANDS/ΜL (ref 0.6–4.47)
LYMPHOCYTES NFR BLD AUTO: 39 % (ref 14–44)
MCH RBC QN AUTO: 31.5 PG (ref 26.8–34.3)
MCHC RBC AUTO-ENTMCNC: 31.6 G/DL (ref 31.4–37.4)
MCV RBC AUTO: 100 FL (ref 82–98)
MONOCYTES # BLD AUTO: 0.61 THOUSAND/ΜL (ref 0.17–1.22)
MONOCYTES NFR BLD AUTO: 7 % (ref 4–12)
NEUTROPHILS # BLD AUTO: 4.27 THOUSANDS/ΜL (ref 1.85–7.62)
NEUTS SEG NFR BLD AUTO: 51 % (ref 43–75)
NITRITE UR QL STRIP: NEGATIVE
NRBC BLD AUTO-RTO: 0 /100 WBCS
PH UR STRIP.AUTO: 6 [PH]
PLATELET # BLD AUTO: 245 THOUSANDS/UL (ref 149–390)
PMV BLD AUTO: 10.7 FL (ref 8.9–12.7)
POTASSIUM SERPL-SCNC: 3.6 MMOL/L (ref 3.5–5.3)
PROT UR STRIP-MCNC: NEGATIVE MG/DL
RBC # BLD AUTO: 4.29 MILLION/UL (ref 3.81–5.12)
SODIUM SERPL-SCNC: 144 MMOL/L (ref 136–145)
SP GR UR STRIP.AUTO: 1.01 (ref 1–1.03)
UROBILINOGEN UR QL STRIP.AUTO: 0.2 E.U./DL
WBC # BLD AUTO: 8.46 THOUSAND/UL (ref 4.31–10.16)

## 2020-10-02 PROCEDURE — 80048 BASIC METABOLIC PNL TOTAL CA: CPT | Performed by: PHYSICIAN ASSISTANT

## 2020-10-02 PROCEDURE — 83690 ASSAY OF LIPASE: CPT | Performed by: PHYSICIAN ASSISTANT

## 2020-10-02 PROCEDURE — 96361 HYDRATE IV INFUSION ADD-ON: CPT

## 2020-10-02 PROCEDURE — 96360 HYDRATION IV INFUSION INIT: CPT

## 2020-10-02 PROCEDURE — 99284 EMERGENCY DEPT VISIT MOD MDM: CPT | Performed by: PHYSICIAN ASSISTANT

## 2020-10-02 PROCEDURE — 83605 ASSAY OF LACTIC ACID: CPT | Performed by: PHYSICIAN ASSISTANT

## 2020-10-02 PROCEDURE — 99284 EMERGENCY DEPT VISIT MOD MDM: CPT

## 2020-10-02 PROCEDURE — 81003 URINALYSIS AUTO W/O SCOPE: CPT | Performed by: PHYSICIAN ASSISTANT

## 2020-10-02 PROCEDURE — G1004 CDSM NDSC: HCPCS

## 2020-10-02 PROCEDURE — 36415 COLL VENOUS BLD VENIPUNCTURE: CPT | Performed by: PHYSICIAN ASSISTANT

## 2020-10-02 PROCEDURE — 85025 COMPLETE CBC W/AUTO DIFF WBC: CPT | Performed by: PHYSICIAN ASSISTANT

## 2020-10-02 PROCEDURE — 81025 URINE PREGNANCY TEST: CPT | Performed by: PHYSICIAN ASSISTANT

## 2020-10-02 PROCEDURE — 74176 CT ABD & PELVIS W/O CONTRAST: CPT

## 2020-10-02 RX ORDER — ONDANSETRON 2 MG/ML
4 INJECTION INTRAMUSCULAR; INTRAVENOUS ONCE
Status: DISCONTINUED | OUTPATIENT
Start: 2020-10-02 | End: 2020-10-02 | Stop reason: HOSPADM

## 2020-10-02 RX ORDER — ONDANSETRON 4 MG/1
4 TABLET, ORALLY DISINTEGRATING ORAL EVERY 6 HOURS PRN
Qty: 12 TABLET | Refills: 0 | Status: SHIPPED | OUTPATIENT
Start: 2020-10-02 | End: 2021-01-25

## 2020-10-02 RX ORDER — DICYCLOMINE HCL 20 MG
20 TABLET ORAL EVERY 6 HOURS PRN
Qty: 20 TABLET | Refills: 0 | Status: SHIPPED | OUTPATIENT
Start: 2020-10-02 | End: 2020-10-09 | Stop reason: SDUPTHER

## 2020-10-02 RX ADMIN — SODIUM CHLORIDE 1000 ML: 0.9 INJECTION, SOLUTION INTRAVENOUS at 08:54

## 2020-10-05 ENCOUNTER — VBI (OUTPATIENT)
Dept: FAMILY MEDICINE CLINIC | Facility: CLINIC | Age: 60
End: 2020-10-05

## 2020-10-09 DIAGNOSIS — K58.9 IBS (IRRITABLE BOWEL SYNDROME): ICD-10-CM

## 2020-10-14 RX ORDER — DICYCLOMINE HCL 20 MG
20 TABLET ORAL EVERY 6 HOURS PRN
Qty: 20 TABLET | Refills: 0 | Status: SHIPPED | OUTPATIENT
Start: 2020-10-14 | End: 2021-01-25

## 2020-10-15 ENCOUNTER — OFFICE VISIT (OUTPATIENT)
Dept: FAMILY MEDICINE CLINIC | Facility: CLINIC | Age: 60
End: 2020-10-15
Payer: COMMERCIAL

## 2020-10-15 ENCOUNTER — APPOINTMENT (OUTPATIENT)
Dept: LAB | Facility: HOSPITAL | Age: 60
End: 2020-10-15
Attending: FAMILY MEDICINE
Payer: COMMERCIAL

## 2020-10-15 VITALS
DIASTOLIC BLOOD PRESSURE: 74 MMHG | SYSTOLIC BLOOD PRESSURE: 132 MMHG | WEIGHT: 163 LBS | HEART RATE: 78 BPM | TEMPERATURE: 97.6 F | OXYGEN SATURATION: 98 % | HEIGHT: 61 IN | BODY MASS INDEX: 30.78 KG/M2

## 2020-10-15 DIAGNOSIS — R19.7 DIARRHEA, UNSPECIFIED TYPE: ICD-10-CM

## 2020-10-15 DIAGNOSIS — R19.7 DIARRHEA, UNSPECIFIED TYPE: Primary | ICD-10-CM

## 2020-10-15 DIAGNOSIS — R10.9 ABDOMINAL PAIN, UNSPECIFIED ABDOMINAL LOCATION: ICD-10-CM

## 2020-10-15 PROCEDURE — 87493 C DIFF AMPLIFIED PROBE: CPT

## 2020-10-15 PROCEDURE — 36415 COLL VENOUS BLD VENIPUNCTURE: CPT | Performed by: FAMILY MEDICINE

## 2020-10-15 PROCEDURE — 87046 STOOL CULTR AEROBIC BACT EA: CPT

## 2020-10-15 PROCEDURE — 3725F SCREEN DEPRESSION PERFORMED: CPT | Performed by: FAMILY MEDICINE

## 2020-10-15 PROCEDURE — 87045 FECES CULTURE AEROBIC BACT: CPT

## 2020-10-15 PROCEDURE — 87427 SHIGA-LIKE TOXIN AG IA: CPT | Performed by: FAMILY MEDICINE

## 2020-10-15 PROCEDURE — 99213 OFFICE O/P EST LOW 20 MIN: CPT | Performed by: FAMILY MEDICINE

## 2020-10-15 RX ORDER — ONDANSETRON 4 MG/1
4 TABLET, FILM COATED ORAL EVERY 6 HOURS PRN
COMMUNITY
Start: 2020-10-02 | End: 2021-01-25

## 2020-10-15 RX ORDER — PHENOBARBITAL, HYOSCYAMINE SULFATE, ATROPINE SULFATE AND SCOPOLAMINE HYDROBROMIDE .0194; .1037; 16.2; .0065 MG/1; MG/1; MG/1; MG/1
1 TABLET ORAL EVERY 6 HOURS PRN
Qty: 30 TABLET | Refills: 1 | Status: SHIPPED | OUTPATIENT
Start: 2020-10-15 | End: 2021-01-25

## 2020-10-16 LAB — C DIFF TOX B TCDB STL QL NAA+PROBE: NEGATIVE

## 2020-10-18 DIAGNOSIS — M62.838 MUSCLE SPASM: ICD-10-CM

## 2020-10-19 RX ORDER — ORPHENADRINE CITRATE 100 MG/1
TABLET, EXTENDED RELEASE ORAL
Qty: 100 TABLET | Refills: 1 | Status: SHIPPED | OUTPATIENT
Start: 2020-10-19 | End: 2020-11-10 | Stop reason: ALTCHOICE

## 2020-10-20 ENCOUNTER — OFFICE VISIT (OUTPATIENT)
Dept: GASTROENTEROLOGY | Facility: CLINIC | Age: 60
End: 2020-10-20
Payer: COMMERCIAL

## 2020-10-20 ENCOUNTER — TELEPHONE (OUTPATIENT)
Dept: GASTROENTEROLOGY | Facility: CLINIC | Age: 60
End: 2020-10-20

## 2020-10-20 ENCOUNTER — TELEPHONE (OUTPATIENT)
Dept: FAMILY MEDICINE CLINIC | Facility: CLINIC | Age: 60
End: 2020-10-20

## 2020-10-20 VITALS
HEART RATE: 88 BPM | TEMPERATURE: 97.5 F | SYSTOLIC BLOOD PRESSURE: 108 MMHG | DIASTOLIC BLOOD PRESSURE: 68 MMHG | WEIGHT: 163.38 LBS | BODY MASS INDEX: 30.87 KG/M2

## 2020-10-20 DIAGNOSIS — E03.9 HYPOTHYROIDISM, UNSPECIFIED TYPE: Primary | ICD-10-CM

## 2020-10-20 DIAGNOSIS — K58.0 IRRITABLE BOWEL SYNDROME WITH DIARRHEA: ICD-10-CM

## 2020-10-20 DIAGNOSIS — B00.1 RECURRENT COLD SORES: Primary | ICD-10-CM

## 2020-10-20 PROCEDURE — 99214 OFFICE O/P EST MOD 30 MIN: CPT | Performed by: PHYSICIAN ASSISTANT

## 2020-10-20 RX ORDER — HYOSCYAMINE SULFATE 0.125 MG
0.25 TABLET ORAL EVERY 4 HOURS PRN
Qty: 90 TABLET | Refills: 1 | Status: SHIPPED | OUTPATIENT
Start: 2020-10-20 | End: 2021-01-25

## 2020-10-20 RX ORDER — VALACYCLOVIR HYDROCHLORIDE 500 MG/1
500 TABLET, FILM COATED ORAL 3 TIMES DAILY
Qty: 21 TABLET | Refills: 1 | Status: SHIPPED | OUTPATIENT
Start: 2020-10-20 | End: 2021-08-05

## 2020-10-20 RX ORDER — ACYCLOVIR 50 MG/G
OINTMENT TOPICAL
Qty: 15 G | Refills: 1 | Status: SHIPPED | OUTPATIENT
Start: 2020-10-20

## 2020-10-23 ENCOUNTER — TELEPHONE (OUTPATIENT)
Dept: FAMILY MEDICINE CLINIC | Facility: CLINIC | Age: 60
End: 2020-10-23

## 2020-10-23 ENCOUNTER — TELEPHONE (OUTPATIENT)
Dept: GASTROENTEROLOGY | Facility: CLINIC | Age: 60
End: 2020-10-23

## 2020-11-04 ENCOUNTER — TELEPHONE (OUTPATIENT)
Dept: GASTROENTEROLOGY | Facility: CLINIC | Age: 60
End: 2020-11-04

## 2020-11-04 ENCOUNTER — LAB (OUTPATIENT)
Dept: LAB | Facility: HOSPITAL | Age: 60
End: 2020-11-04
Payer: COMMERCIAL

## 2020-11-04 DIAGNOSIS — E03.9 HYPOTHYROIDISM, UNSPECIFIED TYPE: ICD-10-CM

## 2020-11-04 LAB — TSH SERPL DL<=0.05 MIU/L-ACNC: 1.14 UIU/ML (ref 0.36–3.74)

## 2020-11-04 PROCEDURE — 36415 COLL VENOUS BLD VENIPUNCTURE: CPT

## 2020-11-04 PROCEDURE — 84443 ASSAY THYROID STIM HORMONE: CPT

## 2020-11-07 DIAGNOSIS — M62.838 MUSCLE SPASM: ICD-10-CM

## 2020-11-10 DIAGNOSIS — M51.36 DEGENERATION OF LUMBAR INTERVERTEBRAL DISC: Primary | ICD-10-CM

## 2020-11-10 RX ORDER — BACLOFEN 10 MG/1
TABLET ORAL
Refills: 0 | OUTPATIENT
Start: 2020-11-10

## 2020-11-10 RX ORDER — BACLOFEN 10 MG/1
10 TABLET ORAL 3 TIMES DAILY
Qty: 30 TABLET | Refills: 2 | Status: SHIPPED | OUTPATIENT
Start: 2020-11-10 | End: 2021-01-25

## 2020-11-23 ENCOUNTER — TELEPHONE (OUTPATIENT)
Dept: FAMILY MEDICINE CLINIC | Facility: CLINIC | Age: 60
End: 2020-11-23

## 2020-12-09 ENCOUNTER — TELEMEDICINE (OUTPATIENT)
Dept: FAMILY MEDICINE CLINIC | Facility: CLINIC | Age: 60
End: 2020-12-09
Payer: COMMERCIAL

## 2020-12-09 VITALS — WEIGHT: 160 LBS | HEIGHT: 61 IN | HEART RATE: 90 BPM | TEMPERATURE: 97.8 F | BODY MASS INDEX: 30.21 KG/M2

## 2020-12-09 DIAGNOSIS — R05.9 COUGH: ICD-10-CM

## 2020-12-09 DIAGNOSIS — B34.9 VIRAL INFECTION, UNSPECIFIED: Primary | ICD-10-CM

## 2020-12-09 PROCEDURE — 3008F BODY MASS INDEX DOCD: CPT | Performed by: FAMILY MEDICINE

## 2020-12-09 PROCEDURE — U0003 INFECTIOUS AGENT DETECTION BY NUCLEIC ACID (DNA OR RNA); SEVERE ACUTE RESPIRATORY SYNDROME CORONAVIRUS 2 (SARS-COV-2) (CORONAVIRUS DISEASE [COVID-19]), AMPLIFIED PROBE TECHNIQUE, MAKING USE OF HIGH THROUGHPUT TECHNOLOGIES AS DESCRIBED BY CMS-2020-01-R: HCPCS | Performed by: FAMILY MEDICINE

## 2020-12-09 PROCEDURE — 99214 OFFICE O/P EST MOD 30 MIN: CPT | Performed by: FAMILY MEDICINE

## 2020-12-09 RX ORDER — PROMETHAZINE HYDROCHLORIDE AND CODEINE PHOSPHATE 6.25; 1 MG/5ML; MG/5ML
5 SYRUP ORAL EVERY 4 HOURS PRN
Qty: 120 ML | Refills: 0 | Status: SHIPPED | OUTPATIENT
Start: 2020-12-09 | End: 2020-12-11 | Stop reason: SDUPTHER

## 2020-12-09 RX ORDER — ORPHENADRINE CITRATE 100 MG/1
100 TABLET, EXTENDED RELEASE ORAL
COMMUNITY

## 2020-12-09 RX ORDER — PROMETHAZINE HYDROCHLORIDE AND CODEINE PHOSPHATE 6.25; 1 MG/5ML; MG/5ML
5 SYRUP ORAL EVERY 4 HOURS PRN
Qty: 120 ML | Refills: 0 | Status: CANCELLED | OUTPATIENT
Start: 2020-12-09

## 2020-12-11 DIAGNOSIS — R05.9 COUGH: ICD-10-CM

## 2020-12-11 LAB — SARS-COV-2 RNA SPEC QL NAA+PROBE: NOT DETECTED

## 2020-12-11 RX ORDER — PROMETHAZINE HYDROCHLORIDE AND CODEINE PHOSPHATE 6.25; 1 MG/5ML; MG/5ML
5 SYRUP ORAL EVERY 4 HOURS PRN
Qty: 120 ML | Refills: 0 | Status: SHIPPED | OUTPATIENT
Start: 2020-12-11 | End: 2021-01-25

## 2020-12-14 ENCOUNTER — TELEMEDICINE (OUTPATIENT)
Dept: FAMILY MEDICINE CLINIC | Facility: CLINIC | Age: 60
End: 2020-12-14
Payer: COMMERCIAL

## 2020-12-14 VITALS — BODY MASS INDEX: 29.66 KG/M2 | TEMPERATURE: 97.8 F | HEART RATE: 84 BPM | OXYGEN SATURATION: 96 % | WEIGHT: 157 LBS

## 2020-12-14 DIAGNOSIS — B34.9 VIRAL ILLNESS: Primary | ICD-10-CM

## 2020-12-14 DIAGNOSIS — J45.20 MILD INTERMITTENT ASTHMA WITHOUT COMPLICATION: ICD-10-CM

## 2020-12-14 PROCEDURE — 99213 OFFICE O/P EST LOW 20 MIN: CPT | Performed by: FAMILY MEDICINE

## 2020-12-14 RX ORDER — ALBUTEROL SULFATE 90 UG/1
2 POWDER, METERED RESPIRATORY (INHALATION) 4 TIMES DAILY PRN
Qty: 3 EACH | Refills: 1 | Status: SHIPPED | OUTPATIENT
Start: 2020-12-14 | End: 2021-12-01 | Stop reason: SDUPTHER

## 2020-12-17 DIAGNOSIS — J30.1 CHRONIC SEASONAL ALLERGIC RHINITIS DUE TO POLLEN: ICD-10-CM

## 2020-12-18 ENCOUNTER — TELEPHONE (OUTPATIENT)
Dept: FAMILY MEDICINE CLINIC | Facility: CLINIC | Age: 60
End: 2020-12-18

## 2020-12-18 ENCOUNTER — TELEMEDICINE (OUTPATIENT)
Dept: FAMILY MEDICINE CLINIC | Facility: CLINIC | Age: 60
End: 2020-12-18
Payer: COMMERCIAL

## 2020-12-18 VITALS — HEART RATE: 91 BPM | TEMPERATURE: 98.7 F | OXYGEN SATURATION: 96 %

## 2020-12-18 DIAGNOSIS — U07.1 COVID-19: Primary | ICD-10-CM

## 2020-12-18 PROCEDURE — 99213 OFFICE O/P EST LOW 20 MIN: CPT | Performed by: FAMILY MEDICINE

## 2020-12-18 RX ORDER — LEVOCETIRIZINE DIHYDROCHLORIDE 5 MG/1
TABLET, FILM COATED ORAL
Qty: 90 TABLET | Refills: 5 | Status: SHIPPED | OUTPATIENT
Start: 2020-12-18 | End: 2022-01-12

## 2020-12-21 ENCOUNTER — TELEMEDICINE (OUTPATIENT)
Dept: FAMILY MEDICINE CLINIC | Facility: CLINIC | Age: 60
End: 2020-12-21
Payer: COMMERCIAL

## 2020-12-21 VITALS — HEIGHT: 61 IN | WEIGHT: 157 LBS | BODY MASS INDEX: 29.64 KG/M2

## 2020-12-21 DIAGNOSIS — B34.9 VIRAL ILLNESS: Primary | ICD-10-CM

## 2020-12-21 PROCEDURE — 99213 OFFICE O/P EST LOW 20 MIN: CPT | Performed by: FAMILY MEDICINE

## 2020-12-30 ENCOUNTER — APPOINTMENT (OUTPATIENT)
Dept: LAB | Facility: HOSPITAL | Age: 60
End: 2020-12-30
Attending: FAMILY MEDICINE
Payer: COMMERCIAL

## 2020-12-30 ENCOUNTER — OFFICE VISIT (OUTPATIENT)
Dept: FAMILY MEDICINE CLINIC | Facility: CLINIC | Age: 60
End: 2020-12-30
Payer: COMMERCIAL

## 2020-12-30 VITALS
SYSTOLIC BLOOD PRESSURE: 130 MMHG | HEART RATE: 88 BPM | WEIGHT: 162.8 LBS | DIASTOLIC BLOOD PRESSURE: 78 MMHG | OXYGEN SATURATION: 100 % | HEIGHT: 61 IN | BODY MASS INDEX: 30.73 KG/M2

## 2020-12-30 DIAGNOSIS — M62.838 MUSCLE SPASM: ICD-10-CM

## 2020-12-30 DIAGNOSIS — R51.9 ACUTE INTRACTABLE HEADACHE, UNSPECIFIED HEADACHE TYPE: ICD-10-CM

## 2020-12-30 DIAGNOSIS — G93.3 POSTVIRAL FATIGUE SYNDROME: ICD-10-CM

## 2020-12-30 DIAGNOSIS — G93.3 POSTVIRAL FATIGUE SYNDROME: Primary | ICD-10-CM

## 2020-12-30 LAB
BASOPHILS # BLD AUTO: 0.03 THOUSANDS/ΜL (ref 0–0.1)
BASOPHILS NFR BLD AUTO: 0 % (ref 0–1)
EOSINOPHIL # BLD AUTO: 0.23 THOUSAND/ΜL (ref 0–0.61)
EOSINOPHIL NFR BLD AUTO: 3 % (ref 0–6)
ERYTHROCYTE [DISTWIDTH] IN BLOOD BY AUTOMATED COUNT: 13.2 % (ref 11.6–15.1)
HCT VFR BLD AUTO: 45.9 % (ref 34.8–46.1)
HGB BLD-MCNC: 14.5 G/DL (ref 11.5–15.4)
IMM GRANULOCYTES # BLD AUTO: 0.03 THOUSAND/UL (ref 0–0.2)
IMM GRANULOCYTES NFR BLD AUTO: 0 % (ref 0–2)
LYMPHOCYTES # BLD AUTO: 3.5 THOUSANDS/ΜL (ref 0.6–4.47)
LYMPHOCYTES NFR BLD AUTO: 38 % (ref 14–44)
MCH RBC QN AUTO: 31.5 PG (ref 26.8–34.3)
MCHC RBC AUTO-ENTMCNC: 31.6 G/DL (ref 31.4–37.4)
MCV RBC AUTO: 100 FL (ref 82–98)
MONOCYTES # BLD AUTO: 0.52 THOUSAND/ΜL (ref 0.17–1.22)
MONOCYTES NFR BLD AUTO: 6 % (ref 4–12)
NEUTROPHILS # BLD AUTO: 5.03 THOUSANDS/ΜL (ref 1.85–7.62)
NEUTS SEG NFR BLD AUTO: 53 % (ref 43–75)
NRBC BLD AUTO-RTO: 0 /100 WBCS
PLATELET # BLD AUTO: 249 THOUSANDS/UL (ref 149–390)
PMV BLD AUTO: 10 FL (ref 8.9–12.7)
RBC # BLD AUTO: 4.6 MILLION/UL (ref 3.81–5.12)
TSH SERPL DL<=0.05 MIU/L-ACNC: 0.95 UIU/ML (ref 0.36–3.74)
WBC # BLD AUTO: 9.34 THOUSAND/UL (ref 4.31–10.16)

## 2020-12-30 PROCEDURE — 84443 ASSAY THYROID STIM HORMONE: CPT

## 2020-12-30 PROCEDURE — 85025 COMPLETE CBC W/AUTO DIFF WBC: CPT

## 2020-12-30 PROCEDURE — 3008F BODY MASS INDEX DOCD: CPT | Performed by: FAMILY MEDICINE

## 2020-12-30 PROCEDURE — 36415 COLL VENOUS BLD VENIPUNCTURE: CPT

## 2020-12-30 PROCEDURE — 99214 OFFICE O/P EST MOD 30 MIN: CPT | Performed by: FAMILY MEDICINE

## 2020-12-30 RX ORDER — KETOROLAC TROMETHAMINE 30 MG/ML
15 INJECTION, SOLUTION INTRAMUSCULAR; INTRAVENOUS ONCE
Status: DISCONTINUED | OUTPATIENT
Start: 2020-12-30 | End: 2020-12-30

## 2020-12-30 RX ORDER — CYCLOBENZAPRINE HCL 10 MG
10 TABLET ORAL 3 TIMES DAILY PRN
Qty: 30 TABLET | Refills: 0 | Status: SHIPPED | OUTPATIENT
Start: 2020-12-30 | End: 2021-01-28

## 2020-12-30 RX ORDER — KETOROLAC TROMETHAMINE 30 MG/ML
15 INJECTION, SOLUTION INTRAMUSCULAR; INTRAVENOUS ONCE
Status: COMPLETED | OUTPATIENT
Start: 2020-12-30 | End: 2020-12-30

## 2020-12-30 RX ADMIN — KETOROLAC TROMETHAMINE 15 MG: 30 INJECTION, SOLUTION INTRAMUSCULAR; INTRAVENOUS at 11:42

## 2020-12-30 NOTE — PROGRESS NOTES
Assessment/Plan:    No problem-specific Assessment & Plan notes found for this encounter  Diagnoses and all orders for this visit:    Postviral fatigue syndrome  -     TSH, 3rd generation with Free T4 reflex; Future  -     CBC and differential; Future    Muscle spasm  -     cyclobenzaprine (FLEXERIL) 10 mg tablet; Take 1 tablet (10 mg total) by mouth 3 (three) times a day as needed for muscle spasms    Acute intractable headache, unspecified headache type  -     ketorolac (TORADOL) 60 mg/2 mL IM injection 15 mg        Subjective:      Patient ID: Roselia Graff is a 64 y o  female  HPI     Patient here for follow up on URI (possibly a false negative COVID) and headache  Symptoms started on 12/8/2020  Notes that her headache persists  States that this does not go away  Located on the top of her head (from ear to ear)  States that she has tried Tylenol, essential oils, pressure points, ice and heat without much improvement  States that the Tylenol makes it bearable  Has taken Tramadol, does not seem to help  She is not able to take NSAIDs due to GI intolerance  Notes light and sound sensitivity with this headache  Denies nausea, vomiting  Sometimes with lightheadedness on standing  Continues with chronic diarrhea, follows with GI  Denies blood in the stool or urine  Denies any changes in urinary color or smell  Notes her urine is clear  Notes that she still has a cough and nasal congestion  States that the cough is on and off  Notes that she is sleeping a lot, about 11 hours per night with a 1 hours nap in the afternoon  The following portions of the patient's history were reviewed and updated as appropriate: allergies, current medications, past family history, past medical history, past social history, past surgical history and problem list     Review of Systems   Constitutional: Positive for fatigue  Negative for activity change, appetite change and fever  HENT: Positive for congestion  Negative for ear pain, rhinorrhea and sore throat  Eyes: Positive for photophobia  Negative for pain  Respiratory: Positive for cough  Negative for shortness of breath  Cardiovascular: Negative for chest pain and leg swelling  Gastrointestinal: Positive for diarrhea  Negative for abdominal pain, constipation, nausea and vomiting  Genitourinary: Negative for dysuria, frequency and urgency  Skin: Negative for rash  Neurological: Positive for light-headedness and headaches  Negative for dizziness  Objective:    /78   Pulse 88   Ht 5' 1" (1 549 m)   Wt 73 8 kg (162 lb 12 8 oz)   SpO2 100%   BMI 30 76 kg/m²      Physical Exam  Vitals signs reviewed  Constitutional:       General: She is not in acute distress  Appearance: Normal appearance  HENT:      Head: Normocephalic and atraumatic  Right Ear: External ear normal       Left Ear: External ear normal       Nose: Nose normal       Mouth/Throat:      Mouth: Mucous membranes are moist    Eyes:      Extraocular Movements: Extraocular movements intact  Conjunctiva/sclera: Conjunctivae normal       Pupils: Pupils are equal, round, and reactive to light  Neck:      Musculoskeletal: Neck supple  Cardiovascular:      Rate and Rhythm: Normal rate and regular rhythm  Heart sounds: Normal heart sounds  Pulmonary:      Effort: Pulmonary effort is normal       Breath sounds: Normal breath sounds  No wheezing, rhonchi or rales  Abdominal:      General: Abdomen is flat  Bowel sounds are normal  There is no distension  Palpations: Abdomen is soft  Tenderness: There is no abdominal tenderness  Musculoskeletal:         General: No deformity  Right lower leg: No edema  Left lower leg: No edema  Lymphadenopathy:      Cervical: No cervical adenopathy  Skin:     General: Skin is warm  Capillary Refill: Capillary refill takes less than 2 seconds  Findings: No rash     Neurological:      General: No focal deficit present  Mental Status: She is alert and oriented to person, place, and time  Mental status is at baseline  Cranial Nerves: No cranial nerve deficit  Sensory: No sensory deficit  Motor: No weakness           [facility]

## 2021-01-05 ENCOUNTER — TELEPHONE (OUTPATIENT)
Dept: OTHER | Facility: OTHER | Age: 61
End: 2021-01-05

## 2021-01-06 NOTE — TELEPHONE ENCOUNTER
Spoke with patient and scheduled in-office appointment for 01/07/2021 with Dr Jet Ramires per patient request  Patient was COVID tested 12/9/2020  Results negative  She is still experiencing severe headache with no improvement since onset

## 2021-01-06 NOTE — TELEPHONE ENCOUNTER
Pt is calling to set up an appointment for her headaches that she is still having post COVID- she would like to see Dr Namita Ansari- Pt would like a call back in the AM

## 2021-01-07 ENCOUNTER — OFFICE VISIT (OUTPATIENT)
Dept: FAMILY MEDICINE CLINIC | Facility: CLINIC | Age: 61
End: 2021-01-07
Payer: COMMERCIAL

## 2021-01-07 VITALS
BODY MASS INDEX: 31.34 KG/M2 | HEART RATE: 86 BPM | OXYGEN SATURATION: 99 % | SYSTOLIC BLOOD PRESSURE: 132 MMHG | DIASTOLIC BLOOD PRESSURE: 80 MMHG | HEIGHT: 61 IN | WEIGHT: 166 LBS | TEMPERATURE: 97.7 F

## 2021-01-07 DIAGNOSIS — R19.7 DIARRHEA, UNSPECIFIED TYPE: ICD-10-CM

## 2021-01-07 DIAGNOSIS — E03.9 HYPOTHYROIDISM, UNSPECIFIED TYPE: ICD-10-CM

## 2021-01-07 DIAGNOSIS — G43.511 INTRACTABLE PERSISTENT MIGRAINE AURA WITHOUT CEREBRAL INFARCTION AND WITH STATUS MIGRAINOSUS: Primary | ICD-10-CM

## 2021-01-07 PROCEDURE — 99214 OFFICE O/P EST MOD 30 MIN: CPT | Performed by: FAMILY MEDICINE

## 2021-01-07 PROCEDURE — 3725F SCREEN DEPRESSION PERFORMED: CPT | Performed by: FAMILY MEDICINE

## 2021-01-07 RX ORDER — COLESEVELAM 180 1/1
1875 TABLET ORAL 2 TIMES DAILY WITH MEALS
Qty: 180 TABLET | Refills: 3 | Status: SHIPPED | OUTPATIENT
Start: 2021-01-07 | End: 2021-08-10

## 2021-01-07 RX ORDER — PREDNISONE 10 MG/1
TABLET ORAL
Qty: 30 TABLET | Refills: 0 | Status: SHIPPED | OUTPATIENT
Start: 2021-01-07 | End: 2021-01-25

## 2021-01-07 RX ORDER — BUTALBITAL, ACETAMINOPHEN AND CAFFEINE 50; 325; 40 MG/1; MG/1; MG/1
TABLET ORAL
Qty: 30 TABLET | Refills: 1 | Status: SHIPPED | OUTPATIENT
Start: 2021-01-07 | End: 2021-08-03

## 2021-01-07 NOTE — PROGRESS NOTES
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 - Primary    Relevant Medications    butalbital-acetaminophen-caffeine (FIORICET,ESGIC) -40 mg per tablet    predniSONE 10 mg tablet    Other Relevant Orders    MRI brain wo contrast       Other    Diarrhea    Relevant Medications    colesevelam (WELCHOL) 625 mg tablet            Subjective:      Patient ID: Theresa Chiu is a 64 y o  female  Patient comes in with a 1 month history of severe headache with photophobia and hyperacusis  She initially had a viral syndrome  COVID-19 testing was negative  She also complains of a several month history of diarrhea  GI workup was negative  The following portions of the patient's history were reviewed and updated as appropriate:   She has a past medical history of Abnormal mammogram, Abrasion, Acute viral syndrome, C  difficile diarrhea, Chronic cystitis, Disease of thyroid gland, Dysuria, Endometriosis, Enteritis, Fibromyalgia, Gross hematuria, Hypercholesterolemia, Photophobia, and Snake bite ,  does not have any pertinent problems on file  ,   has a past surgical history that includes Carpal tunnel release; Plantar fasciectomy; Knee reconstruction, medial patellar femoral ligament (Bilateral); Shoulder surgery (Right); Carpal tunnel release; Endometrial ablation; Cholecystectomy; LAPAROSCOPY; pr colonoscopy flx dx w/collj spec when pfrmd (N/A, 7/11/2018); Colonoscopy (04/29/2010); Hysteroscopy w/ endometrial ablation; Knee surgery (Left); Ovarian cyst surgery; and Hand surgery (Right, 08/13/2018)  ,  family history includes Cancer in her paternal grandfather; Heart disease in her father, mother, and other; Kidney disease in her mother  ,   reports that she has been smoking cigarettes  She has been smoking about 0 25 packs per day  She has never used smokeless tobacco  She reports that she does not drink alcohol or use drugs  ,  is allergic to aminosyn; contrast dye  [iodinated diagnostic agents]; iohexol; procalamine; dairy aid [lactase]; fentanyl; morphine; nsaids; amoxicillin; avocado; cefuroxime; ciprocinonide [fluocinolone]; ciprofloxacin; codeine; eggs or egg-derived products; erythromycin; gluten meal; macrobid [nitrofurantoin]; other; sulfa antibiotics; sumatriptan; wheat bran; and zithromax [azithromycin]     Current Outpatient Medications   Medication Sig Dispense Refill    acyclovir (ZOVIRAX) 5 % ointment Apply topically every 3 (three) hours 15 g 1    Albuterol Sulfate (ProAir RespiClick) 985 (90 Base) MCG/ACT AEPB Inhale 2 puffs 4 (four) times a day as needed (wheezing) 3 each 1    aspirin 81 MG tablet Take 1 tablet by mouth daily      cholecalciferol (VITAMIN D3) 1,000 units tablet Take 1,000 Units by mouth daily       cyclobenzaprine (FLEXERIL) 10 mg tablet Take 1 tablet (10 mg total) by mouth 3 (three) times a day as needed for muscle spasms 30 tablet 0    Fesoterodine Fumarate ER (Toviaz) 4 MG TB24 Take 1 tablet (4 mg total) by mouth daily 90 tablet 0    levocetirizine (XYZAL) 5 MG tablet TAKE 1 TABLET BY MOUTH EVERY DAY 90 tablet 5    levothyroxine 50 mcg tablet Take 1 tablet (50 mcg total) by mouth daily 90 tablet 0    orphenadrine (NORFLEX) 100 mg tablet Take 100 mg by mouth 2 (two) times a day      Probiotic Product (PROBIOTIC-10) CAPS Take by mouth       traMADol (ULTRAM) 50 mg tablet TAKE 1 TABLET (50 MG TOTAL) BY MOUTH EVERY 8 (EIGHT) HOURS AS NEEDED FOR MODERATE PAIN 90 tablet 0    valACYclovir (VALTREX) 500 mg tablet Take 1 tablet (500 mg total) by mouth 3 (three) times a day for 7 days (Patient taking differently: Take 500 mg by mouth as needed ) 21 tablet 1    VITAMIN B COMPLEX-C PO Take 250 mg by mouth daily       baclofen 10 mg tablet Take 1 tablet (10 mg total) by mouth 3 (three) times a day (Patient not taking: Reported on 12/21/2020) 30 tablet 2    butalbital-acetaminophen-caffeine (FIORICET,ESGIC) -40 mg per tablet 1-2 q 4 h prn headache 30 tablet 1    colesevelam (WELCHOL) 625 mg tablet Take 3 tablets (1,875 mg total) by mouth 2 (two) times a day with meals 180 tablet 3    dicyclomine (BENTYL) 20 mg tablet Take 1 tablet (20 mg total) by mouth every 6 (six) hours as needed (abd cramping/initial rx ) (Patient not taking: Reported on 12/21/2020) 20 tablet 0    hyoscyamine (Levsin) 0 125 MG tablet Take 2 tablets (0 25 mg total) by mouth every 4 (four) hours as needed for cramping (Patient not taking: Reported on 12/21/2020) 90 tablet 1    NON FORMULARY       ondansetron (ZOFRAN) 4 mg tablet Take 4 mg by mouth every 6 (six) hours as needed      ondansetron (ZOFRAN-ODT) 4 mg disintegrating tablet Take 1 tablet (4 mg total) by mouth every 6 (six) hours as needed for nausea or vomiting for up to 12 doses (Patient not taking: Reported on 12/21/2020) 12 tablet 0    PHENobarbital-hyoscyamine-atropine-scopolamine (DONNATAL) 16 2 mg TABS Take 1 tablet by mouth every 6 (six) hours as needed (abdominal pain) (Patient not taking: Reported on 12/30/2020) 30 tablet 1    predniSONE 10 mg tablet 4 daily x3, 3 daily x 3, 2 daily x3, 1 daily x3 30 tablet 0    promethazine-codeine (PHENERGAN WITH CODEINE) 6 25-10 mg/5 mL syrup Take 5 mL by mouth every 4 (four) hours as needed for cough (Patient not taking: Reported on 1/7/2021) 120 mL 0     No current facility-administered medications for this visit  Review of Systems   Constitutional: Positive for fatigue  Negative for fever  HENT: Negative  Respiratory: Negative  Cardiovascular: Negative  Gastrointestinal: Positive for diarrhea  Neurological: Positive for headaches           Objective:  Vitals:    01/07/21 0938   BP: 132/80   BP Location: Left arm   Patient Position: Sitting   Cuff Size: Large   Pulse: 86   Temp: 97 7 °F (36 5 °C)   TempSrc: Tympanic   SpO2: 99%   Weight: 75 3 kg (166 lb)   Height: 5' 1" (1 549 m)     Body mass index is 31 37 kg/m²  Physical Exam  Constitutional:       Appearance: She is ill-appearing  HENT:      Head: Normocephalic and atraumatic  Right Ear: Tympanic membrane, ear canal and external ear normal       Left Ear: Tympanic membrane, ear canal and external ear normal       Nose: Nose normal       Mouth/Throat:      Mouth: Mucous membranes are moist    Cardiovascular:      Rate and Rhythm: Normal rate and regular rhythm  Heart sounds: Normal heart sounds  Pulmonary:      Effort: Pulmonary effort is normal       Breath sounds: Normal breath sounds  Abdominal:      General: Bowel sounds are normal       Palpations: Abdomen is soft  Skin:     General: Skin is warm and dry  Neurological:      Mental Status: She is alert and oriented to person, place, and time     Psychiatric:         Behavior: Behavior normal       Comments: Depressed and tearful

## 2021-01-08 ENCOUNTER — TELEPHONE (OUTPATIENT)
Dept: FAMILY MEDICINE CLINIC | Facility: CLINIC | Age: 61
End: 2021-01-08

## 2021-01-08 DIAGNOSIS — B34.9 VIRAL SYNDROME: Primary | ICD-10-CM

## 2021-01-08 PROCEDURE — U0003 INFECTIOUS AGENT DETECTION BY NUCLEIC ACID (DNA OR RNA); SEVERE ACUTE RESPIRATORY SYNDROME CORONAVIRUS 2 (SARS-COV-2) (CORONAVIRUS DISEASE [COVID-19]), AMPLIFIED PROBE TECHNIQUE, MAKING USE OF HIGH THROUGHPUT TECHNOLOGIES AS DESCRIBED BY CMS-2020-01-R: HCPCS | Performed by: FAMILY MEDICINE

## 2021-01-08 PROCEDURE — U0005 INFEC AGEN DETEC AMPLI PROBE: HCPCS | Performed by: FAMILY MEDICINE

## 2021-01-10 LAB — SARS-COV-2 RNA SPEC QL NAA+PROBE: NOT DETECTED

## 2021-01-14 ENCOUNTER — TELEPHONE (OUTPATIENT)
Dept: FAMILY MEDICINE CLINIC | Facility: CLINIC | Age: 61
End: 2021-01-14

## 2021-01-14 ENCOUNTER — HOSPITAL ENCOUNTER (OUTPATIENT)
Dept: RADIOLOGY | Facility: HOSPITAL | Age: 61
Discharge: HOME | End: 2021-01-14
Payer: COMMERCIAL

## 2021-01-14 DIAGNOSIS — G43.511 INTRACTABLE PERSISTENT MIGRAINE AURA WITHOUT CEREBRAL INFARCTION AND WITH STATUS MIGRAINOSUS: ICD-10-CM

## 2021-01-14 DIAGNOSIS — R51.9 INTRACTABLE HEADACHE, UNSPECIFIED CHRONICITY PATTERN, UNSPECIFIED HEADACHE TYPE: Primary | ICD-10-CM

## 2021-01-14 PROCEDURE — G1004 CDSM NDSC: HCPCS

## 2021-01-14 PROCEDURE — 70551 MRI BRAIN STEM W/O DYE: CPT

## 2021-01-14 NOTE — TELEPHONE ENCOUNTER
Called pt to notify her of the normal results   She would just like to know what could be causing her headaches since this came back normal

## 2021-01-14 NOTE — TELEPHONE ENCOUNTER
----- Message from Alvaro Redding MD sent at 1/14/2021 11:46 AM EST -----  Call, MRI brain is normal

## 2021-01-15 ENCOUNTER — TELEPHONE (OUTPATIENT)
Dept: NEUROLOGY | Facility: CLINIC | Age: 61
End: 2021-01-15

## 2021-01-15 ENCOUNTER — PREP FOR PROCEDURE (OUTPATIENT)
Dept: NEUROLOGY | Facility: CLINIC | Age: 61
End: 2021-01-15

## 2021-01-15 ENCOUNTER — LAB (OUTPATIENT)
Dept: LAB | Facility: HOSPITAL | Age: 61
End: 2021-01-15
Attending: FAMILY MEDICINE
Payer: COMMERCIAL

## 2021-01-15 ENCOUNTER — TELEPHONE (OUTPATIENT)
Dept: FAMILY MEDICINE CLINIC | Facility: CLINIC | Age: 61
End: 2021-01-15

## 2021-01-15 ENCOUNTER — CONSULT (OUTPATIENT)
Dept: NEUROLOGY | Facility: CLINIC | Age: 61
End: 2021-01-15
Payer: COMMERCIAL

## 2021-01-15 VITALS
DIASTOLIC BLOOD PRESSURE: 82 MMHG | SYSTOLIC BLOOD PRESSURE: 126 MMHG | HEART RATE: 84 BPM | WEIGHT: 167 LBS | TEMPERATURE: 98.4 F | BODY MASS INDEX: 31.53 KG/M2 | HEIGHT: 61 IN

## 2021-01-15 DIAGNOSIS — R51.9 INTRACTABLE HEADACHE, UNSPECIFIED CHRONICITY PATTERN, UNSPECIFIED HEADACHE TYPE: ICD-10-CM

## 2021-01-15 DIAGNOSIS — R51.9 ACUTE INTRACTABLE HEADACHE, UNSPECIFIED HEADACHE TYPE: Primary | ICD-10-CM

## 2021-01-15 DIAGNOSIS — R51.9 ACUTE INTRACTABLE HEADACHE, UNSPECIFIED HEADACHE TYPE: ICD-10-CM

## 2021-01-15 LAB
ALBUMIN SERPL BCP-MCNC: 4 G/DL (ref 3.5–5)
ALP SERPL-CCNC: 153 U/L (ref 46–116)
ALT SERPL W P-5'-P-CCNC: 88 U/L (ref 12–78)
ANION GAP SERPL CALCULATED.3IONS-SCNC: 7 MMOL/L (ref 4–13)
AST SERPL W P-5'-P-CCNC: 17 U/L (ref 5–45)
BASOPHILS # BLD AUTO: 0.07 THOUSANDS/ΜL (ref 0–0.1)
BASOPHILS NFR BLD AUTO: 0 % (ref 0–1)
BILIRUB SERPL-MCNC: 0.3 MG/DL (ref 0.2–1)
BUN SERPL-MCNC: 13 MG/DL (ref 5–25)
CALCIUM SERPL-MCNC: 9.7 MG/DL (ref 8.3–10.1)
CHLORIDE SERPL-SCNC: 105 MMOL/L (ref 100–108)
CO2 SERPL-SCNC: 31 MMOL/L (ref 21–32)
CREAT SERPL-MCNC: 0.9 MG/DL (ref 0.6–1.3)
CRP SERPL QL: 3.1 MG/L
EOSINOPHIL # BLD AUTO: 0.14 THOUSAND/ΜL (ref 0–0.61)
EOSINOPHIL NFR BLD AUTO: 1 % (ref 0–6)
ERYTHROCYTE [DISTWIDTH] IN BLOOD BY AUTOMATED COUNT: 13.2 % (ref 11.6–15.1)
ERYTHROCYTE [SEDIMENTATION RATE] IN BLOOD: 6 MM/HOUR (ref 0–29)
GFR SERPL CREATININE-BSD FRML MDRD: 71 ML/MIN/1.73SQ M
GLUCOSE P FAST SERPL-MCNC: 91 MG/DL (ref 65–99)
HCT VFR BLD AUTO: 45.4 % (ref 34.8–46.1)
HGB BLD-MCNC: 14.5 G/DL (ref 11.5–15.4)
IMM GRANULOCYTES # BLD AUTO: 0.12 THOUSAND/UL (ref 0–0.2)
IMM GRANULOCYTES NFR BLD AUTO: 1 % (ref 0–2)
LYMPHOCYTES # BLD AUTO: 7.71 THOUSANDS/ΜL (ref 0.6–4.47)
LYMPHOCYTES NFR BLD AUTO: 49 % (ref 14–44)
MCH RBC QN AUTO: 31.9 PG (ref 26.8–34.3)
MCHC RBC AUTO-ENTMCNC: 31.9 G/DL (ref 31.4–37.4)
MCV RBC AUTO: 100 FL (ref 82–98)
MONOCYTES # BLD AUTO: 1.06 THOUSAND/ΜL (ref 0.17–1.22)
MONOCYTES NFR BLD AUTO: 7 % (ref 4–12)
NEUTROPHILS # BLD AUTO: 6.59 THOUSANDS/ΜL (ref 1.85–7.62)
NEUTS SEG NFR BLD AUTO: 42 % (ref 43–75)
NRBC BLD AUTO-RTO: 0 /100 WBCS
PLATELET # BLD AUTO: 323 THOUSANDS/UL (ref 149–390)
PMV BLD AUTO: 10.2 FL (ref 8.9–12.7)
POTASSIUM SERPL-SCNC: 4 MMOL/L (ref 3.5–5.3)
PROT SERPL-MCNC: 7.4 G/DL (ref 6.4–8.2)
RBC # BLD AUTO: 4.54 MILLION/UL (ref 3.81–5.12)
SODIUM SERPL-SCNC: 143 MMOL/L (ref 136–145)
WBC # BLD AUTO: 15.69 THOUSAND/UL (ref 4.31–10.16)

## 2021-01-15 PROCEDURE — 80053 COMPREHEN METABOLIC PANEL: CPT

## 2021-01-15 PROCEDURE — 86618 LYME DISEASE ANTIBODY: CPT

## 2021-01-15 PROCEDURE — 3008F BODY MASS INDEX DOCD: CPT | Performed by: PSYCHIATRY & NEUROLOGY

## 2021-01-15 PROCEDURE — 36415 COLL VENOUS BLD VENIPUNCTURE: CPT

## 2021-01-15 PROCEDURE — 85652 RBC SED RATE AUTOMATED: CPT

## 2021-01-15 PROCEDURE — 85025 COMPLETE CBC W/AUTO DIFF WBC: CPT

## 2021-01-15 PROCEDURE — 86140 C-REACTIVE PROTEIN: CPT

## 2021-01-15 PROCEDURE — 99205 OFFICE O/P NEW HI 60 MIN: CPT | Performed by: PSYCHIATRY & NEUROLOGY

## 2021-01-15 RX ORDER — DIVALPROEX SODIUM 250 MG/1
TABLET, EXTENDED RELEASE ORAL
Qty: 30 TABLET | Refills: 2 | Status: SHIPPED | OUTPATIENT
Start: 2021-01-15 | End: 2021-01-28

## 2021-01-15 NOTE — PROGRESS NOTES
Koki Lozano is a 64 y o  female   Presents with complaints of headache    Assessment:  1  Acute intractable headache, unspecified headache type        Plan:  Lumbar puncture   Depakote  mg daily   Follow-up 3-4 weeks    Discussion:   [facility] has had a persistent headache for more than 5 weeks associated with a non COVID viral infection  She has not noted any improvement of her symptoms with Tylenol, tramadol and is currently on a taper of high dose steroids without improvement  She has not had definitive meningeal signs, but does reports some scalp sensitivity  Recent CBC reveals a high white count which may be a reflection of her current steroid usage  Her sedimentation rate is normal   Suspect that if she had giant cell arteritis that her headaches would have improved significantly on high dose steroids at this point  Her headaches are quite different from her typical migraine  Given the severity of her headaches have recommended lumbar puncture to rule out underlying inflammatory process ( brain MRI was normal)  Have recommended a trial of Depakote 250 mg in hopes to reduce headache frequency and severity and we did discuss potential adverse effects  I will see her back in follow-up in 3-4 weeks  We also discussed risks involved with lumbar puncture including headache, infection and bleeding  Subjective:    [facility] is a right-handed woman who presents with the above complaints  She states that on January 9th she awoke with symptoms of an upper respiratory infection associated with the headache  She states the headache is an intense pressure type headache that travels from year to year over the vertex with occasional stabbing type pains  She states the headache has been constant since January 9th  There was some initial concerns that she may have had COVID however COVID testing was negative  She states she has not had a fever associated with this    She has states that she has chronic neck pain but has not noticed a worsening of her neck pain associated with these headaches  She does have some mild photophobia and sonophobia associated with the headaches but states that these symptoms are not as severe as when she would use to get migraine headaches in the past   She describes the headache as an intense pressure type pain with occasional stabbing  Occasionally the intensity does increase, however she has not noted anything that seems to precipitate worsening of the headache  She has tried Tylenol, tramadol Sudafed Flexeril for her headaches and has not noticed any improvement  She tried Fioricet a few times and did not find this to be helpful so she stopped using it  More recently she was started on high-dose steroids at 80 mg daily for several days now at 60 mg daily and has not noticed any improvement in her headaches  She states that in addition she finds that if she gets up quickly she feels lightheaded and sometimes feels that her knees are little bit weak  She also reports a tight feeling around her chest that has begun more recently but is not severe  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 but states that she forces herself to do this and does note fatigue  She reports a history of migraine headaches that would typically be associated with the vision disturbance preceding the headache and the headache would localized to behind left eye is a severe throbbing type pain associated with marked photophobia, sonophobia and nausea  She states it has been several years since she has had 1 of these headaches  She recently had an MRI of the brain done which was normal   A CBC performed late last month was unremarkable, CBC performed today demonstrates a high white count which may be reflection of her current steroid usage  Sedimentation rate was normal   Lyme titer is pending        Past Medical History:   Diagnosis Date    Abnormal mammogram     last assessed 6/28/16    Abrasion     last assessed 9/12/16    Acute viral syndrome     last assessed 12/8/17    C  difficile diarrhea     Chronic cystitis     Disease of thyroid gland     Dysuria     last assessed 12/5/16    Endometriosis     Enteritis     last assessed 11/4/14    Fibromyalgia     Gross hematuria     Hypercholesterolemia     resolved 10/29/14    Photophobia     last assessed 9/29/16    Snake bite     last assessed 9/14/15       Family History:  Family History   Problem Relation Age of Onset    Kidney disease Mother     Heart disease Mother     Heart disease Father     Cancer Paternal Grandfather     Heart disease Other        Past Surgical History:  Past Surgical History:   Procedure Laterality Date    CARPAL TUNNEL RELEASE      CARPAL TUNNEL RELEASE      CHOLECYSTECTOMY      COLONOSCOPY  04/29/2010    ENDOMETRIAL ABLATION      HAND SURGERY Right 08/13/2018    cyst removal    HYSTEROSCOPY W/ ENDOMETRIAL ABLATION      KNEE RECONSTRUCTION, MEDIAL PATELLAR FEMORAL LIGAMENT Bilateral     screws removed    KNEE SURGERY Left     LAPAROSCOPY      OVARIAN CYST SURGERY      PLANTAR FASCIECTOMY      left calf    ND COLONOSCOPY FLX DX W/COLLJ SPEC WHEN PFRMD N/A 7/11/2018    Procedure: COLONOSCOPY;  Surgeon: Isabell Lugo MD;  Location: [facility]; Service: Gastroenterology    SHOULDER SURGERY Right     and stem cell surgery on shoulder        Social History:   reports that she has been smoking cigarettes  She has been smoking about 0 25 packs per day  She has never used smokeless tobacco  She reports that she does not drink alcohol or use drugs      Allergies:  Aminosyn, Contrast dye  [iodinated diagnostic agents], Iohexol, Procalamine, Dairy aid [lactase], Fentanyl, Morphine, Nsaids, Amoxicillin, Avocado, Cefuroxime, Ciprocinonide [fluocinolone], Ciprofloxacin, Codeine, Eggs or egg-derived products, Erythromycin, Gluten meal, Macrobid [nitrofurantoin], Other, Sulfa antibiotics, Sumatriptan, Wheat bran, and Zithromax [azithromycin]      Current Outpatient Medications:     acyclovir (ZOVIRAX) 5 % ointment, Apply topically every 3 (three) hours, Disp: 15 g, Rfl: 1    aspirin 81 MG tablet, Take 1 tablet by mouth daily, Disp: , Rfl:     butalbital-acetaminophen-caffeine (FIORICET,ESGIC) -40 mg per tablet, 1-2 q 4 h prn headache, Disp: 30 tablet, Rfl: 1    cholecalciferol (VITAMIN D3) 1,000 units tablet, Take 1,000 Units by mouth daily , Disp: , Rfl:     colesevelam (WELCHOL) 625 mg tablet, Take 3 tablets (1,875 mg total) by mouth 2 (two) times a day with meals (Patient taking differently: Take 1,875 mg by mouth daily ), Disp: 180 tablet, Rfl: 3    cyclobenzaprine (FLEXERIL) 10 mg tablet, Take 1 tablet (10 mg total) by mouth 3 (three) times a day as needed for muscle spasms, Disp: 30 tablet, Rfl: 0    Fesoterodine Fumarate ER (Toviaz) 4 MG TB24, Take 1 tablet (4 mg total) by mouth daily, Disp: 90 tablet, Rfl: 0    levocetirizine (XYZAL) 5 MG tablet, TAKE 1 TABLET BY MOUTH EVERY DAY, Disp: 90 tablet, Rfl: 5    levothyroxine 50 mcg tablet, Take 1 tablet (50 mcg total) by mouth daily, Disp: 90 tablet, Rfl: 0    predniSONE 10 mg tablet, 4 daily x3, 3 daily x 3, 2 daily x3, 1 daily x3, Disp: 30 tablet, Rfl: 0    Probiotic Product (PROBIOTIC-10) CAPS, Take by mouth , Disp: , Rfl:     traMADol (ULTRAM) 50 mg tablet, TAKE 1 TABLET (50 MG TOTAL) BY MOUTH EVERY 8 (EIGHT) HOURS AS NEEDED FOR MODERATE PAIN, Disp: 90 tablet, Rfl: 0    VITAMIN B COMPLEX-C PO, Take 250 mg by mouth daily , Disp: , Rfl:     Albuterol Sulfate (ProAir RespiClick) 222 (90 Base) MCG/ACT AEPB, Inhale 2 puffs 4 (four) times a day as needed (wheezing) (Patient not taking: Reported on 1/15/2021), Disp: 3 each, Rfl: 1    baclofen 10 mg tablet, Take 1 tablet (10 mg total) by mouth 3 (three) times a day (Patient not taking: Reported on 12/21/2020), Disp: 30 tablet, Rfl: 2    dicyclomine (BENTYL) 20 mg tablet, Take 1 tablet (20 mg total) by mouth every 6 (six) hours as needed (abd cramping/initial rx ) (Patient not taking: Reported on 12/21/2020), Disp: 20 tablet, Rfl: 0    hyoscyamine (Levsin) 0 125 MG tablet, Take 2 tablets (0 25 mg total) by mouth every 4 (four) hours as needed for cramping (Patient not taking: Reported on 12/21/2020), Disp: 90 tablet, Rfl: 1    NON FORMULARY, , Disp: , Rfl:     ondansetron (ZOFRAN) 4 mg tablet, Take 4 mg by mouth every 6 (six) hours as needed, Disp: , Rfl:     ondansetron (ZOFRAN-ODT) 4 mg disintegrating tablet, Take 1 tablet (4 mg total) by mouth every 6 (six) hours as needed for nausea or vomiting for up to 12 doses (Patient not taking: Reported on 12/21/2020), Disp: 12 tablet, Rfl: 0    orphenadrine (NORFLEX) 100 mg tablet, Take 100 mg by mouth 2 (two) times a day, Disp: , Rfl:     PHENobarbital-hyoscyamine-atropine-scopolamine (DONNATAL) 16 2 mg TABS, Take 1 tablet by mouth every 6 (six) hours as needed (abdominal pain) (Patient not taking: Reported on 12/30/2020), Disp: 30 tablet, Rfl: 1    promethazine-codeine (PHENERGAN WITH CODEINE) 6 25-10 mg/5 mL syrup, Take 5 mL by mouth every 4 (four) hours as needed for cough (Patient not taking: Reported on 1/7/2021), Disp: 120 mL, Rfl: 0    valACYclovir (VALTREX) 500 mg tablet, Take 1 tablet (500 mg total) by mouth 3 (three) times a day for 7 days (Patient taking differently: Take 500 mg by mouth as needed ), Disp: 21 tablet, Rfl: 1      I have reviewed the past medical, social and family history, current medications, allergies, vitals, review of systems and updated this information as appropriate today     Objective:    Vitals:  Blood pressure 126/82, pulse 84, temperature 98 4 °F (36 9 °C), temperature source Tympanic, height 5' 1" (1 549 m), weight 75 8 kg (167 lb)  Physical Exam    Neurological Exam    GENERAL:  Cooperative in no acute distress   Well-developed and well-nourished    HEAD and NECK   Head is atraumatic normocephalic with no lesions or masses  Neck is supple with full range of motion   Patient reports diffuse tenderness over the crown and temples of her head  No enlarged palpable arteries are noted    CARDIOVASCULAR  Carotid Arteries-no carotid bruits  NEUROLOGIC:  Mental Status-the patient is awake alert and oriented without aphasia or apraxia  Cranial Nerves: Visual fields are full to confrontation  Discs are flat  Extraocular movements are full without nystagmus  Pupils are 2-1/2 mm and reactive  Face is symmetrical to light touch  Movements of facial expression move symmetrically  Hearing is normal to finger rub on the right, not on the left  Soft palate lifts symmetrically  Shoulder shrug is symmetrical  Tongue is midline without atrophy  Motor: No drift is noted on arm extension  Strength is full in the upper and lower extremities with normal bulk and tone  Sensory: Intact to temperature and vibratory sensation in the upper and lower extremities bilaterally  No sensory level was noted in the trunk to temperature  Cortical function is intact  Coordination: Finger to nose testing is performed accurately  Romberg is negative  Gait reveals a normal base with symmetrical arm swing  Tandem walk is normal   Reflexes:   2/4 in the biceps, triceps, brachioradialis, knee jerk and ankle jerk regions  Toes are downgoing            ROS:    Review of Systems   Constitutional: Positive for activity change (due to headaches)  Negative for appetite change and fever  HENT: Positive for tinnitus (daily)  Negative for hearing loss, trouble swallowing and voice change  Eyes: Positive for photophobia  Negative for pain  Respiratory: Negative  Negative for shortness of breath  Cardiovascular: Negative  Negative for palpitations  Gastrointestinal: Positive for diarrhea  Negative for nausea and vomiting  Endocrine: Negative  Negative for cold intolerance  Genitourinary: Negative    Negative for dysuria, frequency and urgency  Musculoskeletal: Positive for back pain, gait problem, neck pain and neck stiffness  Negative for myalgias  Skin: Negative  Negative for rash  Neurological: Positive for dizziness, facial asymmetry, weakness, light-headedness, numbness (on the side of her face) and headaches  Negative for tremors, seizures, syncope and speech difficulty  Hematological: Negative  Does not bruise/bleed easily  Psychiatric/Behavioral: Positive for decreased concentration (from the headaches) and sleep disturbance (from headaches)  Negative for confusion and hallucinations

## 2021-01-15 NOTE — TELEPHONE ENCOUNTER
Dr Joan Leigh is asking if one of you  can please see patient today  Please advised! Waiting for Dr Robert Cruz office to fax over referral order

## 2021-01-15 NOTE — TELEPHONE ENCOUNTER
Dr Mu Pina is willing to see the patient now, call and spoke with patient son, and will have patient call back to let the office know if she can come in now

## 2021-01-15 NOTE — TELEPHONE ENCOUNTER
T/c with [facility] - called to let Dr Rey Wynn know that Zhou Morales is being seen today (1/15/21) by Dr Della Arango (they called to verify that she was not here in our office at the time)

## 2021-01-15 NOTE — LETTER
January 15, 2021     Tomas Che MD  [address] [facility]    Patient: Miguel Garcias   YOB: 1960   Date of Visit: 1/15/2021       Dear Dr Wallace Barron: Thank you for referring Mena Stallings to me for evaluation  Below are my notes for this consultation  If you have questions, please do not hesitate to call me  I look forward to following your patient along with you  Sincerely,        Ashok Rivera MD        CC: No Recipients  Ashok Rivera MD  1/15/2021 12:07 PM  Incomplete  Miguel Garcias is a 64 y o  female   Presents with complaints of headache    Assessment:  1  Acute intractable headache, unspecified headache type        Plan:  Lumbar puncture   Depakote  mg daily   Follow-up 3-4 weeks    Discussion:   Jaydon Carranza has had a persistent headache for more than 5 weeks associated with a non COVID viral infection  She has not noted any improvement of her symptoms with Tylenol, tramadol and is currently on a taper of high dose steroids without improvement  She has not had definitive meningeal signs, but does reports some scalp sensitivity  Recent CBC reveals a high white count which may be a reflection of her current steroid usage  Her sedimentation rate is normal   Suspect that if she had giant cell arteritis that her headaches would have improved significantly on high dose steroids at this point  Her headaches are quite different from her typical migraine  Given the severity of her headaches have recommended lumbar puncture to rule out underlying inflammatory process ( brain MRI was normal)  Have recommended a trial of Depakote 250 mg in hopes to reduce headache frequency and severity and we did discuss potential adverse effects  I will see her back in follow-up in 3-4 weeks  We also discussed risks involved with lumbar puncture including headache, infection and bleeding        Subjective:    HPI    Jaydon Carranza is a right-handed woman who presents with the above complaints  She states that on January 9th she awoke with symptoms of an upper respiratory infection associated with the headache  She states the headache is an intense pressure type headache that travels from year to year over the vertex with occasional stabbing type pains  She states the headache has been constant since January 9th  There was some initial concerns that she may have had COVID however COVID testing was negative  She states she has not had a fever associated with this  She has states that she has chronic neck pain but has not noticed a worsening of her neck pain associated with these headaches  She does have some mild photophobia and sonophobia associated with the headaches but states that these symptoms are not as severe as when she would use to get migraine headaches in the past   She describes the headache as an intense pressure type pain with occasional stabbing  Occasionally the intensity does increase, however she has not noted anything that seems to precipitate worsening of the headache  She has tried Tylenol, tramadol Sudafed Flexeril for her headaches and has not noticed any improvement  She tried Fioricet a few times and did not find this to be helpful so she stopped using it  More recently she was started on high-dose steroids at 80 mg daily for several days now at 60 mg daily and has not noticed any improvement in her headaches  She states that in addition she finds that if she gets up quickly she feels lightheaded and sometimes feels that her knees are little bit weak  She also reports a tight feeling around her chest that has begun more recently but is not severe  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 but states that she forces herself to do this and does note fatigue    She reports a history of migraine headaches that would typically be associated with the vision disturbance preceding the headache and the headache would localized to behind left eye is a severe throbbing type pain associated with marked photophobia, sonophobia and nausea  She states it has been several years since she has had 1 of these headaches  She recently had an MRI of the brain done which was normal   A CBC performed late last month was unremarkable, CBC performed today demonstrates a high white count which may be reflection of her current steroid usage  Sedimentation rate was normal   Lyme titer is pending  Past Medical History:   Diagnosis Date    Abnormal mammogram     last assessed 6/28/16    Abrasion     last assessed 9/12/16    Acute viral syndrome     last assessed 12/8/17    C  difficile diarrhea     Chronic cystitis     Disease of thyroid gland     Dysuria     last assessed 12/5/16    Endometriosis     Enteritis     last assessed 11/4/14    Fibromyalgia     Gross hematuria     Hypercholesterolemia     resolved 10/29/14    Photophobia     last assessed 9/29/16    Snake bite     last assessed 9/14/15       Family History:  Family History   Problem Relation Age of Onset    Kidney disease Mother     Heart disease Mother     Heart disease Father     Cancer Paternal Grandfather     Heart disease Other        Past Surgical History:  Past Surgical History:   Procedure Laterality Date    CARPAL TUNNEL RELEASE      CARPAL TUNNEL RELEASE      CHOLECYSTECTOMY      COLONOSCOPY  04/29/2010    ENDOMETRIAL ABLATION      HAND SURGERY Right 08/13/2018    cyst removal    HYSTEROSCOPY W/ ENDOMETRIAL ABLATION      KNEE RECONSTRUCTION, MEDIAL PATELLAR FEMORAL LIGAMENT Bilateral     screws removed    KNEE SURGERY Left     LAPAROSCOPY      OVARIAN CYST SURGERY      PLANTAR FASCIECTOMY      left calf    DC COLONOSCOPY FLX DX W/COLLJ SPEC WHEN PFRMD N/A 7/11/2018    Procedure: COLONOSCOPY;  Surgeon: Harmeet Mcclellan MD;  Location: [facility];   Service: Gastroenterology    SHOULDER SURGERY Right     and stem cell surgery on shoulder        Social History:   reports that she has been smoking cigarettes  She has been smoking about 0 25 packs per day  She has never used smokeless tobacco  She reports that she does not drink alcohol or use drugs      Allergies:  Aminosyn, Contrast dye  [iodinated diagnostic agents], Iohexol, Procalamine, Dairy aid [lactase], Fentanyl, Morphine, Nsaids, Amoxicillin, Avocado, Cefuroxime, Ciprocinonide [fluocinolone], Ciprofloxacin, Codeine, Eggs or egg-derived products, Erythromycin, Gluten meal, Macrobid [nitrofurantoin], Other, Sulfa antibiotics, Sumatriptan, Wheat bran, and Zithromax [azithromycin]      Current Outpatient Medications:     acyclovir (ZOVIRAX) 5 % ointment, Apply topically every 3 (three) hours, Disp: 15 g, Rfl: 1    aspirin 81 MG tablet, Take 1 tablet by mouth daily, Disp: , Rfl:     butalbital-acetaminophen-caffeine (FIORICET,ESGIC) -40 mg per tablet, 1-2 q 4 h prn headache, Disp: 30 tablet, Rfl: 1    cholecalciferol (VITAMIN D3) 1,000 units tablet, Take 1,000 Units by mouth daily , Disp: , Rfl:     colesevelam (WELCHOL) 625 mg tablet, Take 3 tablets (1,875 mg total) by mouth 2 (two) times a day with meals (Patient taking differently: Take 1,875 mg by mouth daily ), Disp: 180 tablet, Rfl: 3    cyclobenzaprine (FLEXERIL) 10 mg tablet, Take 1 tablet (10 mg total) by mouth 3 (three) times a day as needed for muscle spasms, Disp: 30 tablet, Rfl: 0    Fesoterodine Fumarate ER (Toviaz) 4 MG TB24, Take 1 tablet (4 mg total) by mouth daily, Disp: 90 tablet, Rfl: 0    levocetirizine (XYZAL) 5 MG tablet, TAKE 1 TABLET BY MOUTH EVERY DAY, Disp: 90 tablet, Rfl: 5    levothyroxine 50 mcg tablet, Take 1 tablet (50 mcg total) by mouth daily, Disp: 90 tablet, Rfl: 0    predniSONE 10 mg tablet, 4 daily x3, 3 daily x 3, 2 daily x3, 1 daily x3, Disp: 30 tablet, Rfl: 0    Probiotic Product (PROBIOTIC-10) CAPS, Take by mouth , Disp: , Rfl:     traMADol (ULTRAM) 50 mg tablet, TAKE 1 TABLET (50 MG TOTAL) BY MOUTH EVERY 8 (EIGHT) HOURS AS NEEDED FOR MODERATE PAIN, Disp: 90 tablet, Rfl: 0    VITAMIN B COMPLEX-C PO, Take 250 mg by mouth daily , Disp: , Rfl:     Albuterol Sulfate (ProAir RespiClick) 685 (90 Base) MCG/ACT AEPB, Inhale 2 puffs 4 (four) times a day as needed (wheezing) (Patient not taking: Reported on 1/15/2021), Disp: 3 each, Rfl: 1    baclofen 10 mg tablet, Take 1 tablet (10 mg total) by mouth 3 (three) times a day (Patient not taking: Reported on 12/21/2020), Disp: 30 tablet, Rfl: 2    dicyclomine (BENTYL) 20 mg tablet, Take 1 tablet (20 mg total) by mouth every 6 (six) hours as needed (abd cramping/initial rx ) (Patient not taking: Reported on 12/21/2020), Disp: 20 tablet, Rfl: 0    hyoscyamine (Levsin) 0 125 MG tablet, Take 2 tablets (0 25 mg total) by mouth every 4 (four) hours as needed for cramping (Patient not taking: Reported on 12/21/2020), Disp: 90 tablet, Rfl: 1    NON FORMULARY, , Disp: , Rfl:     ondansetron (ZOFRAN) 4 mg tablet, Take 4 mg by mouth every 6 (six) hours as needed, Disp: , Rfl:     ondansetron (ZOFRAN-ODT) 4 mg disintegrating tablet, Take 1 tablet (4 mg total) by mouth every 6 (six) hours as needed for nausea or vomiting for up to 12 doses (Patient not taking: Reported on 12/21/2020), Disp: 12 tablet, Rfl: 0    orphenadrine (NORFLEX) 100 mg tablet, Take 100 mg by mouth 2 (two) times a day, Disp: , Rfl:     PHENobarbital-hyoscyamine-atropine-scopolamine (DONNATAL) 16 2 mg TABS, Take 1 tablet by mouth every 6 (six) hours as needed (abdominal pain) (Patient not taking: Reported on 12/30/2020), Disp: 30 tablet, Rfl: 1    promethazine-codeine (PHENERGAN WITH CODEINE) 6 25-10 mg/5 mL syrup, Take 5 mL by mouth every 4 (four) hours as needed for cough (Patient not taking: Reported on 1/7/2021), Disp: 120 mL, Rfl: 0    valACYclovir (VALTREX) 500 mg tablet, Take 1 tablet (500 mg total) by mouth 3 (three) times a day for 7 days (Patient taking differently: Take 500 mg by mouth as needed ), Disp: 21 tablet, Rfl: 1      I have reviewed the past medical, social and family history, current medications, allergies, vitals, review of systems and updated this information as appropriate today     Objective:    Vitals:  Blood pressure 126/82, pulse 84, temperature 98 4 °F (36 9 °C), temperature source Tympanic, height 5' 1" (1 549 m), weight 75 8 kg (167 lb)  Physical Exam    Neurological Exam    GENERAL:  Cooperative in no acute distress  Well-developed and well-nourished    HEAD and NECK   Head is atraumatic normocephalic with no lesions or masses  Neck is supple with full range of motion   Patient reports diffuse tenderness over the crown and temples of her head  No enlarged palpable arteries are noted    CARDIOVASCULAR  Carotid Arteries-no carotid bruits  NEUROLOGIC:  Mental Status-the patient is awake alert and oriented without aphasia or apraxia  Cranial Nerves: Visual fields are full to confrontation  Discs are flat  Extraocular movements are full without nystagmus  Pupils are 2-1/2 mm and reactive  Face is symmetrical to light touch  Movements of facial expression move symmetrically  Hearing is normal to finger rub bilaterally  Soft palate lifts symmetrically  Shoulder shrug is symmetrical  Tongue is midline without atrophy  Motor: No drift is noted on arm extension  Strength is full in the upper and lower extremities with normal bulk and tone  Sensory: Intact to temperature and vibratory sensation in the upper and lower extremities bilaterally  Cortical function is intact  Coordination: Finger to nose testing is performed accurately  Romberg is negative  Gait reveals a normal base with symmetrical arm swing  Tandem walk is normal   Reflexes:   2/4 in the biceps, triceps, brachioradialis, knee jerk and ankle jerk regions  Toes are downgoing            ROS:    Review of Systems   Constitutional: Positive for activity change (due to headaches)  Negative for appetite change and fever  HENT: Positive for tinnitus (daily)  Negative for hearing loss, trouble swallowing and voice change  Eyes: Positive for photophobia  Negative for pain  Respiratory: Negative  Negative for shortness of breath  Cardiovascular: Negative  Negative for palpitations  Gastrointestinal: Positive for diarrhea  Negative for nausea and vomiting  Endocrine: Negative  Negative for cold intolerance  Genitourinary: Negative  Negative for dysuria, frequency and urgency  Musculoskeletal: Positive for back pain, gait problem, neck pain and neck stiffness  Negative for myalgias  Skin: Negative  Negative for rash  Neurological: Positive for dizziness, facial asymmetry, weakness, light-headedness, numbness (on the side of her face) and headaches  Negative for tremors, seizures, syncope and speech difficulty  Hematological: Negative  Does not bruise/bleed easily  Psychiatric/Behavioral: Positive for decreased concentration (from the headaches) and sleep disturbance (from headaches)  Negative for confusion and hallucinations  Michelle Hensley MD  1/15/2021 11:09 AM  Karissa Lozano is a 64 y o  female [ ]    Assessment:  1   Acute intractable headache, unspecified headache type        Plan:  [ ]    Discussion:  [ ]      Subjective:    HPI  [ ]      Past Medical History:   Diagnosis Date    Abnormal mammogram     last assessed 6/28/16    Abrasion     last assessed 9/12/16    Acute viral syndrome     last assessed 12/8/17    C  difficile diarrhea     Chronic cystitis     Disease of thyroid gland     Dysuria     last assessed 12/5/16    Endometriosis     Enteritis     last assessed 11/4/14    Fibromyalgia     Gross hematuria     Hypercholesterolemia     resolved 10/29/14    Photophobia     last assessed 9/29/16    Snake bite     last assessed 9/14/15       Family History:  Family History   Problem Relation Age of Onset    Kidney disease Mother     Heart disease Mother     Heart disease Father     Cancer Paternal Grandfather     Heart disease Other        Past Surgical History:  Past Surgical History:   Procedure Laterality Date    CARPAL TUNNEL RELEASE      CARPAL TUNNEL RELEASE      CHOLECYSTECTOMY      COLONOSCOPY  04/29/2010    ENDOMETRIAL ABLATION      HAND SURGERY Right 08/13/2018    cyst removal    HYSTEROSCOPY W/ ENDOMETRIAL ABLATION      KNEE RECONSTRUCTION, MEDIAL PATELLAR FEMORAL LIGAMENT Bilateral     screws removed    KNEE SURGERY Left     LAPAROSCOPY      OVARIAN CYST SURGERY      PLANTAR FASCIECTOMY      left calf    LA COLONOSCOPY FLX DX W/COLLJ SPEC WHEN PFRMD N/A 7/11/2018    Procedure: COLONOSCOPY;  Surgeon: Jermaine Cisneros MD;  Location: [facility]; Service: Gastroenterology    SHOULDER SURGERY Right     and stem cell surgery on shoulder        Social History:   reports that she has been smoking cigarettes  She has been smoking about 0 25 packs per day  She has never used smokeless tobacco  She reports that she does not drink alcohol or use drugs      Allergies:  Aminosyn, Contrast dye  [iodinated diagnostic agents], Iohexol, Procalamine, Dairy aid [lactase], Fentanyl, Morphine, Nsaids, Amoxicillin, Avocado, Cefuroxime, Ciprocinonide [fluocinolone], Ciprofloxacin, Codeine, Eggs or egg-derived products, Erythromycin, Gluten meal, Macrobid [nitrofurantoin], Other, Sulfa antibiotics, Sumatriptan, Wheat bran, and Zithromax [azithromycin]      Current Outpatient Medications:     acyclovir (ZOVIRAX) 5 % ointment, Apply topically every 3 (three) hours, Disp: 15 g, Rfl: 1    aspirin 81 MG tablet, Take 1 tablet by mouth daily, Disp: , Rfl:     butalbital-acetaminophen-caffeine (FIORICET,ESGIC) -40 mg per tablet, 1-2 q 4 h prn headache, Disp: 30 tablet, Rfl: 1    cholecalciferol (VITAMIN D3) 1,000 units tablet, Take 1,000 Units by mouth daily , Disp: , Rfl:     colesevelam (WELCHOL) 625 mg tablet, Take 3 tablets (1,875 mg total) by mouth 2 (two) times a day with meals (Patient taking differently: Take 1,875 mg by mouth daily ), Disp: 180 tablet, Rfl: 3    cyclobenzaprine (FLEXERIL) 10 mg tablet, Take 1 tablet (10 mg total) by mouth 3 (three) times a day as needed for muscle spasms, Disp: 30 tablet, Rfl: 0    Fesoterodine Fumarate ER (Toviaz) 4 MG TB24, Take 1 tablet (4 mg total) by mouth daily, Disp: 90 tablet, Rfl: 0    levocetirizine (XYZAL) 5 MG tablet, TAKE 1 TABLET BY MOUTH EVERY DAY, Disp: 90 tablet, Rfl: 5    levothyroxine 50 mcg tablet, Take 1 tablet (50 mcg total) by mouth daily, Disp: 90 tablet, Rfl: 0    predniSONE 10 mg tablet, 4 daily x3, 3 daily x 3, 2 daily x3, 1 daily x3, Disp: 30 tablet, Rfl: 0    Probiotic Product (PROBIOTIC-10) CAPS, Take by mouth , Disp: , Rfl:     traMADol (ULTRAM) 50 mg tablet, TAKE 1 TABLET (50 MG TOTAL) BY MOUTH EVERY 8 (EIGHT) HOURS AS NEEDED FOR MODERATE PAIN, Disp: 90 tablet, Rfl: 0    VITAMIN B COMPLEX-C PO, Take 250 mg by mouth daily , Disp: , Rfl:     Albuterol Sulfate (ProAir RespiClick) 504 (90 Base) MCG/ACT AEPB, Inhale 2 puffs 4 (four) times a day as needed (wheezing) (Patient not taking: Reported on 1/15/2021), Disp: 3 each, Rfl: 1    baclofen 10 mg tablet, Take 1 tablet (10 mg total) by mouth 3 (three) times a day (Patient not taking: Reported on 12/21/2020), Disp: 30 tablet, Rfl: 2    dicyclomine (BENTYL) 20 mg tablet, Take 1 tablet (20 mg total) by mouth every 6 (six) hours as needed (abd cramping/initial rx ) (Patient not taking: Reported on 12/21/2020), Disp: 20 tablet, Rfl: 0    hyoscyamine (Levsin) 0 125 MG tablet, Take 2 tablets (0 25 mg total) by mouth every 4 (four) hours as needed for cramping (Patient not taking: Reported on 12/21/2020), Disp: 90 tablet, Rfl: 1    NON FORMULARY, , Disp: , Rfl:     ondansetron (ZOFRAN) 4 mg tablet, Take 4 mg by mouth every 6 (six) hours as needed, Disp: , Rfl:     ondansetron (ZOFRAN-ODT) 4 mg disintegrating tablet, Take 1 tablet (4 mg total) by mouth every 6 (six) hours as needed for nausea or vomiting for up to 12 doses (Patient not taking: Reported on 12/21/2020), Disp: 12 tablet, Rfl: 0    orphenadrine (NORFLEX) 100 mg tablet, Take 100 mg by mouth 2 (two) times a day, Disp: , Rfl:     PHENobarbital-hyoscyamine-atropine-scopolamine (DONNATAL) 16 2 mg TABS, Take 1 tablet by mouth every 6 (six) hours as needed (abdominal pain) (Patient not taking: Reported on 12/30/2020), Disp: 30 tablet, Rfl: 1    promethazine-codeine (PHENERGAN WITH CODEINE) 6 25-10 mg/5 mL syrup, Take 5 mL by mouth every 4 (four) hours as needed for cough (Patient not taking: Reported on 1/7/2021), Disp: 120 mL, Rfl: 0    valACYclovir (VALTREX) 500 mg tablet, Take 1 tablet (500 mg total) by mouth 3 (three) times a day for 7 days (Patient taking differently: Take 500 mg by mouth as needed ), Disp: 21 tablet, Rfl: 1    [  ]     Objective:    Vitals:  Blood pressure 126/82, pulse 84, temperature 98 4 °F (36 9 °C), temperature source Tympanic, height 5' 1" (1 549 m), weight 75 8 kg (167 lb)  Physical Exam    Neurological Exam  [ ]      ROS:    Review of Systems   Constitutional: Positive for activity change (due to headaches)  Negative for appetite change and fever  HENT: Positive for tinnitus (daily)  Negative for hearing loss, trouble swallowing and voice change  Eyes: Positive for photophobia  Negative for pain  Respiratory: Negative  Negative for shortness of breath  Cardiovascular: Negative  Negative for palpitations  Gastrointestinal: Positive for diarrhea  Negative for nausea and vomiting  Endocrine: Negative  Negative for cold intolerance  Genitourinary: Negative  Negative for dysuria, frequency and urgency  Musculoskeletal: Positive for back pain, gait problem, neck pain and neck stiffness  Negative for myalgias  Skin: Negative  Negative for rash     Neurological: Positive for dizziness, facial asymmetry, weakness, light-headedness, numbness (on the side of her face) and headaches  Negative for tremors, seizures, syncope and speech difficulty  Hematological: Negative  Does not bruise/bleed easily  Psychiatric/Behavioral: Positive for decreased concentration (from the headaches) and sleep disturbance (from headaches)  Negative for confusion and hallucinations

## 2021-01-16 LAB — B BURGDOR IGG+IGM SER-ACNC: 15

## 2021-01-20 ENCOUNTER — TELEPHONE (OUTPATIENT)
Dept: NON INVASIVE DIAGNOSTICS | Facility: HOSPITAL | Age: 61
End: 2021-01-20

## 2021-01-25 ENCOUNTER — HOSPITAL ENCOUNTER (OUTPATIENT)
Dept: INTERVENTIONAL RADIOLOGY/VASCULAR | Facility: HOSPITAL | Age: 61
Discharge: HOME | End: 2021-01-25
Attending: PSYCHIATRY & NEUROLOGY | Admitting: RADIOLOGY
Payer: COMMERCIAL

## 2021-01-25 VITALS
RESPIRATION RATE: 16 BRPM | SYSTOLIC BLOOD PRESSURE: 134 MMHG | TEMPERATURE: 98.1 F | OXYGEN SATURATION: 99 % | DIASTOLIC BLOOD PRESSURE: 78 MMHG | HEART RATE: 88 BPM

## 2021-01-25 DIAGNOSIS — R51.9 ACUTE INTRACTABLE HEADACHE, UNSPECIFIED HEADACHE TYPE: ICD-10-CM

## 2021-01-25 LAB
APPEARANCE CSF: CLEAR
BASOPHILS # BLD AUTO: 0.04 THOUSANDS/ΜL (ref 0–0.1)
BASOPHILS NFR BLD AUTO: 0 % (ref 0–1)
EOSINOPHIL # BLD AUTO: 0.16 THOUSAND/ΜL (ref 0–0.61)
EOSINOPHIL NFR BLD AUTO: 1 % (ref 0–6)
ERYTHROCYTE [DISTWIDTH] IN BLOOD BY AUTOMATED COUNT: 13.4 % (ref 11.6–15.1)
GLUCOSE CSF-MCNC: 65 MG/DL (ref 50–80)
GRAM STN SPEC: NORMAL
HCT VFR BLD AUTO: 42.8 % (ref 34.8–46.1)
HGB BLD-MCNC: 13.4 G/DL (ref 11.5–15.4)
IMM GRANULOCYTES # BLD AUTO: 0.05 THOUSAND/UL (ref 0–0.2)
IMM GRANULOCYTES NFR BLD AUTO: 1 % (ref 0–2)
INR PPP: 0.95 (ref 0.84–1.19)
LYMPHOCYTES # BLD AUTO: 3.17 THOUSANDS/ΜL (ref 0.6–4.47)
LYMPHOCYTES NFR BLD AUTO: 29 % (ref 14–44)
LYMPHOCYTES NFR CSF MANUAL: 100 %
MCH RBC QN AUTO: 31.2 PG (ref 26.8–34.3)
MCHC RBC AUTO-ENTMCNC: 31.3 G/DL (ref 31.4–37.4)
MCV RBC AUTO: 100 FL (ref 82–98)
MONOCYTES # BLD AUTO: 0.78 THOUSAND/ΜL (ref 0.17–1.22)
MONOCYTES NFR BLD AUTO: 7 % (ref 4–12)
NEUTROPHILS # BLD AUTO: 6.91 THOUSANDS/ΜL (ref 1.85–7.62)
NEUTS SEG NFR BLD AUTO: 62 % (ref 43–75)
NRBC BLD AUTO-RTO: 0 /100 WBCS
PLATELET # BLD AUTO: 218 THOUSANDS/UL (ref 149–390)
PMV BLD AUTO: 10.1 FL (ref 8.9–12.7)
PROT CSF-MCNC: 42 MG/DL (ref 15–45)
PROTHROMBIN TIME: 12.2 SECONDS (ref 11.6–14.5)
RBC # BLD AUTO: 4.3 MILLION/UL (ref 3.81–5.12)
RBC # CSF MANUAL: 2 UL (ref 0–10)
TOTAL CELLS COUNTED BLD: NO
TOTAL CELLS COUNTED SPEC: 2
TUBE # CSF: 4
WBC # BLD AUTO: 11.11 THOUSAND/UL (ref 4.31–10.16)
WBC # CSF AUTO: 2 /UL (ref 0–5)

## 2021-01-25 PROCEDURE — 62328 DX LMBR SPI PNXR W/FLUOR/CT: CPT

## 2021-01-25 PROCEDURE — 84157 ASSAY OF PROTEIN OTHER: CPT | Performed by: PSYCHIATRY & NEUROLOGY

## 2021-01-25 PROCEDURE — 85610 PROTHROMBIN TIME: CPT | Performed by: PSYCHIATRY & NEUROLOGY

## 2021-01-25 PROCEDURE — 62328 DX LMBR SPI PNXR W/FLUOR/CT: CPT | Performed by: RADIOLOGY

## 2021-01-25 PROCEDURE — 87070 CULTURE OTHR SPECIMN AEROBIC: CPT | Performed by: PSYCHIATRY & NEUROLOGY

## 2021-01-25 PROCEDURE — 85025 COMPLETE CBC W/AUTO DIFF WBC: CPT | Performed by: PSYCHIATRY & NEUROLOGY

## 2021-01-25 PROCEDURE — 86618 LYME DISEASE ANTIBODY: CPT | Performed by: PSYCHIATRY & NEUROLOGY

## 2021-01-25 PROCEDURE — 89050 BODY FLUID CELL COUNT: CPT | Performed by: PSYCHIATRY & NEUROLOGY

## 2021-01-25 PROCEDURE — 89051 BODY FLUID CELL COUNT: CPT | Performed by: PSYCHIATRY & NEUROLOGY

## 2021-01-25 PROCEDURE — 82945 GLUCOSE OTHER FLUID: CPT | Performed by: PSYCHIATRY & NEUROLOGY

## 2021-01-25 RX ORDER — LIDOCAINE HYDROCHLORIDE 20 MG/ML
INJECTION, SOLUTION INFILTRATION; PERINEURAL CODE/TRAUMA/SEDATION MEDICATION
Status: COMPLETED | OUTPATIENT
Start: 2021-01-25 | End: 2021-01-25

## 2021-01-25 RX ORDER — ACETAMINOPHEN 160 MG
2000 TABLET,DISINTEGRATING ORAL DAILY
COMMUNITY

## 2021-01-25 RX ADMIN — LIDOCAINE HYDROCHLORIDE 1 ML: 20 INJECTION, SOLUTION INFILTRATION; PERINEURAL at 09:12

## 2021-01-25 NOTE — SEDATION DOCUMENTATION
Procedure completed and pt tolerated well - 16 ml of fluid removed and sent to lab  Opening pressure 19 cm

## 2021-01-25 NOTE — H&P
Interventional Radiology Preprocedure Note    History/Indication for procedure:   Abisai Quintana is a 64 y o  female with headache    Relevant past medical history:    Past Medical History:   Diagnosis Date    Abnormal mammogram     last assessed 6/28/16    Abrasion     last assessed 9/12/16    Acute viral syndrome     last assessed 12/8/17    C  difficile diarrhea     Chronic cystitis     Disease of thyroid gland     Dysuria     last assessed 12/5/16    Endometriosis     Enteritis     last assessed 11/4/14    Fibromyalgia     Gross hematuria     Hypercholesterolemia     resolved 10/29/14    Photophobia     last assessed 9/29/16    Snake bite     last assessed 9/14/15     Patient Active Problem List   Diagnosis    Right hand pain    Seasonal allergies    Mixed hyperlipidemia    Abnormal liver enzymes    Asthma without status asthmaticus    Degeneration of lumbar intervertebral disc    Hypothyroidism    Menopause ovarian failure    Diarrhea    Nausea    Bloating    Abdominal pain    Screening for colon cancer    Irritable bowel syndrome with diarrhea    Viral syndrome    Left lower quadrant pain    Abnormal MRI, lumbar spine    Intractable persistent migraine 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 with status migrainosus       /64   Pulse 89   Temp 98 1 °F (36 7 °C) (Temporal)   Resp 20   SpO2 97%     Medications:    Inpatient Medications:     Scheduled Medications:      Infusions:  No current facility-administered medications for this encounter         PRN:      Outpatient Medications:  Current Outpatient Medications on File Prior to Encounter   Medication Sig Dispense Refill    levocetirizine (XYZAL) 5 MG tablet TAKE 1 TABLET BY MOUTH EVERY DAY 90 tablet 5    levothyroxine 50 mcg tablet Take 1 tablet (50 mcg total) by mouth daily 90 tablet 0    acyclovir (ZOVIRAX) 5 % ointment Apply topically every 3 (three) hours 15 g 1    Albuterol Sulfate (ProAir RespiClick) 438 (90 Base) MCG/ACT AEPB Inhale 2 puffs 4 (four) times a day as needed (wheezing) (Patient not taking: Reported on 1/15/2021) 3 each 1    aspirin 81 MG tablet Take 1 tablet by mouth daily      baclofen 10 mg tablet Take 1 tablet (10 mg total) by mouth 3 (three) times a day (Patient not taking: Reported on 12/21/2020) 30 tablet 2    butalbital-acetaminophen-caffeine (FIORICET,ESGIC) -40 mg per tablet 1-2 q 4 h prn headache 30 tablet 1    cholecalciferol (VITAMIN D3) 1,000 units tablet Take 1,000 Units by mouth daily       colesevelam (WELCHOL) 625 mg tablet Take 3 tablets (1,875 mg total) by mouth 2 (two) times a day with meals (Patient taking differently: Take 1,875 mg by mouth daily ) 180 tablet 3    cyclobenzaprine (FLEXERIL) 10 mg tablet Take 1 tablet (10 mg total) by mouth 3 (three) times a day as needed for muscle spasms 30 tablet 0    dicyclomine (BENTYL) 20 mg tablet Take 1 tablet (20 mg total) by mouth every 6 (six) hours as needed (abd cramping/initial rx ) (Patient not taking: Reported on 12/21/2020) 20 tablet 0    divalproex sodium (DEPAKOTE ER) 250 mg 24 hr tablet 1 p o  q day 30 tablet 2    Fesoterodine Fumarate ER (Toviaz) 4 MG TB24 Take 1 tablet (4 mg total) by mouth daily 90 tablet 0    hyoscyamine (Levsin) 0 125 MG tablet Take 2 tablets (0 25 mg total) by mouth every 4 (four) hours as needed for cramping (Patient not taking: Reported on 12/21/2020) 90 tablet 1    NON FORMULARY       ondansetron (ZOFRAN) 4 mg tablet Take 4 mg by mouth every 6 (six) hours as needed      ondansetron (ZOFRAN-ODT) 4 mg disintegrating tablet Take 1 tablet (4 mg total) by mouth every 6 (six) hours as needed for nausea or vomiting for up to 12 doses (Patient not taking: Reported on 12/21/2020) 12 tablet 0    orphenadrine (NORFLEX) 100 mg tablet Take 100 mg by mouth 2 (two) times a day      PHENobarbital-hyoscyamine-atropine-scopolamine (DONNATAL) 16 2 mg TABS Take 1 tablet by mouth every 6 (six) hours as needed (abdominal pain) (Patient not taking: Reported on 12/30/2020) 30 tablet 1    predniSONE 10 mg tablet 4 daily x3, 3 daily x 3, 2 daily x3, 1 daily x3 30 tablet 0    Probiotic Product (PROBIOTIC-10) CAPS Take by mouth       promethazine-codeine (PHENERGAN WITH CODEINE) 6 25-10 mg/5 mL syrup Take 5 mL by mouth every 4 (four) hours as needed for cough (Patient not taking: Reported on 1/7/2021) 120 mL 0    traMADol (ULTRAM) 50 mg tablet TAKE 1 TABLET (50 MG TOTAL) BY MOUTH EVERY 8 (EIGHT) HOURS AS NEEDED FOR MODERATE PAIN 90 tablet 0    valACYclovir (VALTREX) 500 mg tablet Take 1 tablet (500 mg total) by mouth 3 (three) times a day for 7 days (Patient taking differently: Take 500 mg by mouth as needed ) 21 tablet 1    VITAMIN B COMPLEX-C PO Take 250 mg by mouth daily        No current facility-administered medications on file prior to encounter          Allergies   Allergen Reactions    Aminosyn Anaphylaxis    Contrast Dye  [Iodinated Diagnostic Agents] Anaphylaxis    Iohexol Anaphylaxis    Procalamine Anaphylaxis    Dairy Aid [Lactase]      intolerence    Fentanyl Other (See Comments) and Irritability     Skin crawling      Morphine Other (See Comments) and Irritability     Skin crawling     Nsaids GI Intolerance    Amoxicillin Hives    Avocado      Gi intolerence    Cefuroxime Hives    Ciprocinonide [Fluocinolone] Hives    Ciprofloxacin Hives    Codeine      Skin crawling    Eggs Or Egg-Derived Products GI Bleeding     Stomach pains    Erythromycin Hives    Gluten Meal     Macrobid [Nitrofurantoin] Hives    Other     Sulfa Antibiotics Hives    Sumatriptan Hives and Other (See Comments)     Diff breathing    Wheat Bran     Zithromax [Azithromycin] Hives       Anticoagulants: none    ASA classification: ASA 2 - Patient with mild systemic disease with no functional limitations    Airway Assessment: II (hard and soft palate, upper portion of tonsils anduvula visible)    Relevant family history: None    Relevant review of systems: None    Prior sedation/anesthesia: yes    Can the patient lie flat? Yes     Does patient have sleep apnea? No    If yes, does patient use CPAP? not applicable    NPO Status: yes    Labs:   CBC with diff:   Lab Results   Component Value Date    WBC 11 11 (H) 01/25/2021    HGB 13 4 01/25/2021    HCT 42 8 01/25/2021     (H) 01/25/2021     01/25/2021    MCH 31 2 01/25/2021    MCHC 31 3 (L) 01/25/2021    RDW 13 4 01/25/2021    MPV 10 1 01/25/2021    NRBC 0 01/25/2021     BMP/CMP:  Lab Results   Component Value Date     10/30/2014    K 4 0 01/15/2021    K 3 4 (L) 10/30/2014     01/15/2021     10/30/2014    CO2 31 01/15/2021    CO2 27 10/30/2014    ANIONGAP 6 10/30/2014    BUN 13 01/15/2021    BUN 13 10/30/2014    CREATININE 0 90 01/15/2021    CREATININE 0 81 10/30/2014    GLUCOSE 118 10/30/2014    CALCIUM 9 7 01/15/2021    CALCIUM 8 8 10/30/2014    AST 17 01/15/2021    ALT 88 (H) 01/15/2021    ALKPHOS 153 (H) 01/15/2021    EGFR 71 01/15/2021   ,     Coags:   Lab Results   Component Value Date    INR 0 95 01/25/2021   ,   Results from last 7 days   Lab Units 01/25/21  0835   INR  0 95        Relevant imaging studies:   CT reviewed  Directed physical examination:  No apparent distress  AO x3  Normal respiratory effort  Regular rate and rhythm    Assessment/Plan:   Lumbar puncture    Sedation/Anesthesia plan:  Local anesthesia will be used as needed for procedure      Consent with alternatives to the procedure, risks and benefits have been explained and discussed with the patient/patient's family: yes

## 2021-01-25 NOTE — DISCHARGE INSTRUCTIONS
Lumbar Puncture   WHAT YOU NEED TO KNOW:   Lumbar puncture (LP) is a procedure in which a needle is inserted in your back and into your spinal canal  This is usually done to collect cerebrospinal fluid (CSF) to check for an infection, inflammation, bleeding, or other conditions that affect the brain  CSF is a clear, protective fluid that flows around the brain and inside the spinal canal  LP may also be done to remove CSF to reduce pressure in the brain  DISCHARGE INSTRUCTIONS:   Medicines:   · Acetaminophen: This medicine decreases pain and lowers a fever  It is available without a doctor's order  Ask how much to take and how often to take it  Follow directions  Acetaminophen can cause liver damage  · NSAIDs:  These medicines decrease swelling, pain, and fever  NSAIDs are available without a doctor's order  Ask your healthcare provider which medicine is right for you and how much to take  Take as directed  NSAIDs can cause stomach bleeding or kidney problems if not taken correctly  · Pain medicine: You may be given a prescription medicine to decrease severe pain  Do not wait until the pain is severe before you take more pain medicine  · Take your medicine as directed  Contact your healthcare provider if you think your medicine is not helping or if you have side effects  Tell him or her if you are allergic to any medicine  Keep a list of the medicines, vitamins, and herbs you take  Include the amounts, and when and why you take them  Bring the list or the pill bottles to follow-up visits  Carry your medicine list with you in case of an emergency  Follow up with your healthcare provider as directed:  Write down your questions so you remember to ask them during your visits  Post-lumbar puncture headache: You may develop a headache during the first few hours after your LP that may last for several days  The headache may be mild to severe and may get worse when you sit or stand   The following may help ease a post-lumbar puncture headache:  · Drink plenty of liquids: You should drink more liquid than usual after your LP  Ask how much liquid is right for you  Caffeine may be used to treat a headache  Drinks, such as coffee, tea, or some sodas, have caffeine  Caffeine is also available over the counter in tablet form  Ask about using caffeine to treat your headache  Do not drink alcohol  · Lie down: If you have a headache after your lumbar puncture, it may be helpful to lie down and rest   Contact your healthcare provider if:   · You have questions or concerns about your condition or care  Seek care immediately or call 911 if:   · You have a severe headache that does not get better after you lie down  · You have a fever  · You have a stiff neck or have trouble thinking clearly  · Your legs, feet, or other parts below the waist feel numb, tingly, or weak  · You have bleeding or a discharge coming from the area where the needle was put into your back  · You have severe pain in your back or neck  © 2017 [facility] Information is for End User's use only and may not be sold, redistributed or otherwise used for commercial purposes  All illustrations and images included in CareNotes® are the copyrighted property of A D [employer] M [employer]  or Fabio Mccarty  The above information is an  only  It is not intended as medical advice for individual conditions or treatments  Talk to your doctor, nurse or pharmacist before following any medical regimen to see if it is safe and effective for you

## 2021-01-25 NOTE — BRIEF OP NOTE (RAD/CATH)
INTERVENTIONAL RADIOLOGY PROCEDURE NOTE    Date: 1/25/2021    Procedure: IR LUMBAR PUNCTURE    Preoperative diagnosis:   1  Acute intractable headache, unspecified headache type         Postoperative diagnosis: Same  Surgeon: Debby Lujan MD     Assistant: None  No qualified resident was available  Blood loss:  Minimal    Specimens:  16 cc of clear CSF     Findings:   Opening pressure 19 cm CSF  Successful lumbar puncture    Complications: None immediate      Anesthesia: local

## 2021-01-26 ENCOUNTER — TELEPHONE (OUTPATIENT)
Dept: NEUROLOGY | Facility: CLINIC | Age: 61
End: 2021-01-26

## 2021-01-26 DIAGNOSIS — M54.50 CHRONIC BILATERAL LOW BACK PAIN WITHOUT SCIATICA: ICD-10-CM

## 2021-01-26 DIAGNOSIS — G89.29 CHRONIC BILATERAL LOW BACK PAIN WITHOUT SCIATICA: ICD-10-CM

## 2021-01-26 DIAGNOSIS — R51.9 ACUTE INTRACTABLE HEADACHE, UNSPECIFIED HEADACHE TYPE: Primary | ICD-10-CM

## 2021-01-26 RX ORDER — TRAMADOL HYDROCHLORIDE 50 MG/1
50 TABLET ORAL EVERY 8 HOURS PRN
Qty: 90 TABLET | Refills: 0 | Status: SHIPPED | OUTPATIENT
Start: 2021-01-26 | End: 2021-08-03 | Stop reason: SDUPTHER

## 2021-01-26 NOTE — TELEPHONE ENCOUNTER
Spoke with patient who developed a post LP headache that is positional a few hours after her lumbar puncture yesterday  She finds that when she lies flat and is consuming caffeine her headache is her baseline pre LP level  We discussed that it is important to continue to lie flat drinking caffeinated products and is anticipated that this positional headache will resolve if it does not a blood patch could be performed  It is unclear why she is having flank pain and abdominal pain which feels muscular in nature  This persists or worsens I have recommended evaluation in the emergency room  She remains concerned that she may have temporal arteritis  She has been off of steroids for a week and have recommended rechecking a sedimentation rate  She will initiate the Depakote and I will see her back in follow-up as planned    She will notify me if her symptoms persist or worsen

## 2021-01-26 NOTE — TELEPHONE ENCOUNTER
pt called and states that she had LP yesterday and extreme headache started about 4 hours after coming home from the LP  she states that she spoke to a dr around 5:30pm yesterday  she thinks the name was dr Castro Brought  She states that she called our office# and left a message with the on call and then received a call back from a private number  i do not see any documentation of this  she states that she has been taking fioricet and took 2 vicodin, has been drinking caffeine  this am at 5am- headache and really severe stomach/flank and back pain  took 2 fioricet this am and went back to sleep and pain is really bad now  makes it difficult to get a deep breath  she states that she has been laying flat, drinking caffeine  she has been laying flat since 6 pm last night  pain was off the charts last night, now it is 8/10     i did advise that she be evaluated in the ER  she states that she can't drive in this state and with the weather and does not have anyone to take her   i did advise that she call 911  she states that she wants to know if this is expected after LP  When she lays down pain gets slightly better      Please advise  [phone] to leave detailed message

## 2021-01-26 NOTE — TELEPHONE ENCOUNTER
Pt called and had spinal puncture and after 4-hrs she was experiencing sever headache pt took vicodin to help however this morning she wake up with sever flank and abdominal pain,  The headaches are a little better  Pt is asking for advise on what she should do   [phone]

## 2021-01-26 NOTE — TELEPHONE ENCOUNTER
Medication:  PDMP   01/07/2021  1  01/07/2021  [id number] -[id number]  30 0  5  BR SAJI  [id number]  PENNS ([id number])  0   [place]    12/11/2020  2  12/11/2020  PROMETHAZINE-CODEINE SYRUP  120 0  4  KI GRA  [id number]  [facility] ([id number])  0  9 0 MME  [place]  [place]    10/15/2020  1  10/15/2020  PHENOHYTRO TABLET  30 0  8  BR SAJI  [id number]  PENNS ([id number])  0   [place]    09/18/2020  1  09/18/2020  PROMETHAZINE-CODEINE SYRUP  120 0  6  PA CRU  [id number]  PENNS ([id number])  0  6 0 MME  [place] Ins  [place]    08/13/2020  1  08/13/2020  TRAMADOL HCL 50 MG TABLET  90 0  30  MARGAUX ROBBIN  [id number]  PENNS ([id number])  0  15 0 MME  [place] Ins  [place]        Active agreement on file -Yes      Dr Peterson Jauregui pt  Dr Peterson Jauregui is out of the office today

## 2021-01-27 ENCOUNTER — APPOINTMENT (EMERGENCY)
Dept: RADIOLOGY | Facility: HOSPITAL | Age: 61
End: 2021-01-27
Payer: COMMERCIAL

## 2021-01-27 ENCOUNTER — HOSPITAL ENCOUNTER (EMERGENCY)
Facility: HOSPITAL | Age: 61
Discharge: HOME | End: 2021-01-27
Attending: EMERGENCY MEDICINE | Admitting: EMERGENCY MEDICINE
Payer: COMMERCIAL

## 2021-01-27 ENCOUNTER — APPOINTMENT (EMERGENCY)
Dept: CT IMAGING | Facility: HOSPITAL | Age: 61
End: 2021-01-27
Payer: COMMERCIAL

## 2021-01-27 ENCOUNTER — APPOINTMENT (EMERGENCY)
Dept: NUCLEAR MEDICINE | Facility: HOSPITAL | Age: 61
End: 2021-01-27
Payer: COMMERCIAL

## 2021-01-27 VITALS
DIASTOLIC BLOOD PRESSURE: 62 MMHG | TEMPERATURE: 98.7 F | WEIGHT: 167 LBS | SYSTOLIC BLOOD PRESSURE: 136 MMHG | HEIGHT: 61 IN | OXYGEN SATURATION: 94 % | BODY MASS INDEX: 31.53 KG/M2 | HEART RATE: 95 BPM | RESPIRATION RATE: 15 BRPM

## 2021-01-27 DIAGNOSIS — R51.9 CHRONIC NONINTRACTABLE HEADACHE, UNSPECIFIED HEADACHE TYPE: Primary | ICD-10-CM

## 2021-01-27 DIAGNOSIS — R70.0 ESR RAISED: ICD-10-CM

## 2021-01-27 DIAGNOSIS — G89.29 CHRONIC NONINTRACTABLE HEADACHE, UNSPECIFIED HEADACHE TYPE: Primary | ICD-10-CM

## 2021-01-27 LAB
ALBUMIN SERPL BCP-MCNC: 3.6 G/DL (ref 3.5–5)
ALP SERPL-CCNC: 238 U/L (ref 46–116)
ALT SERPL W P-5'-P-CCNC: 70 U/L (ref 12–78)
ANION GAP SERPL CALCULATED.3IONS-SCNC: 11 MMOL/L (ref 4–13)
APTT PPP: 26 SECONDS (ref 23–37)
AST SERPL W P-5'-P-CCNC: 25 U/L (ref 5–45)
BASOPHILS # BLD AUTO: 0.04 THOUSANDS/ΜL (ref 0–0.1)
BASOPHILS NFR BLD AUTO: 0 % (ref 0–1)
BILIRUB SERPL-MCNC: 0.5 MG/DL (ref 0.2–1)
BUN SERPL-MCNC: 8 MG/DL (ref 5–25)
CALCIUM SERPL-MCNC: 9.9 MG/DL (ref 8.3–10.1)
CHLORIDE SERPL-SCNC: 104 MMOL/L (ref 100–108)
CO2 SERPL-SCNC: 25 MMOL/L (ref 21–32)
CREAT SERPL-MCNC: 0.89 MG/DL (ref 0.6–1.3)
D DIMER PPP FEU-MCNC: 0.92 UG/ML FEU
EOSINOPHIL # BLD AUTO: 0.15 THOUSAND/ΜL (ref 0–0.61)
EOSINOPHIL NFR BLD AUTO: 1 % (ref 0–6)
ERYTHROCYTE [DISTWIDTH] IN BLOOD BY AUTOMATED COUNT: 13.2 % (ref 11.6–15.1)
ERYTHROCYTE [SEDIMENTATION RATE] IN BLOOD: 72 MM/HOUR (ref 0–29)
GFR SERPL CREATININE-BSD FRML MDRD: 72 ML/MIN/1.73SQ M
GLUCOSE SERPL-MCNC: 99 MG/DL (ref 65–140)
HCG SERPL QL: NEGATIVE
HCT VFR BLD AUTO: 43.4 % (ref 34.8–46.1)
HGB BLD-MCNC: 13.8 G/DL (ref 11.5–15.4)
IMM GRANULOCYTES # BLD AUTO: 0.05 THOUSAND/UL (ref 0–0.2)
IMM GRANULOCYTES NFR BLD AUTO: 1 % (ref 0–2)
INR PPP: 0.92 (ref 0.84–1.19)
LIPASE SERPL-CCNC: 125 U/L (ref 73–393)
LYMPHOCYTES # BLD AUTO: 2.89 THOUSANDS/ΜL (ref 0.6–4.47)
LYMPHOCYTES NFR BLD AUTO: 28 % (ref 14–44)
MCH RBC QN AUTO: 31.7 PG (ref 26.8–34.3)
MCHC RBC AUTO-ENTMCNC: 31.8 G/DL (ref 31.4–37.4)
MCV RBC AUTO: 100 FL (ref 82–98)
MONOCYTES # BLD AUTO: 0.7 THOUSAND/ΜL (ref 0.17–1.22)
MONOCYTES NFR BLD AUTO: 7 % (ref 4–12)
NEUTROPHILS # BLD AUTO: 6.68 THOUSANDS/ΜL (ref 1.85–7.62)
NEUTS SEG NFR BLD AUTO: 63 % (ref 43–75)
NRBC BLD AUTO-RTO: 0 /100 WBCS
NT-PROBNP SERPL-MCNC: 53 PG/ML
PLATELET # BLD AUTO: 247 THOUSANDS/UL (ref 149–390)
PMV BLD AUTO: 10 FL (ref 8.9–12.7)
POTASSIUM SERPL-SCNC: 4 MMOL/L (ref 3.5–5.3)
PROT SERPL-MCNC: 7.5 G/DL (ref 6.4–8.2)
PROTHROMBIN TIME: 11.9 SECONDS (ref 11.6–14.5)
RBC # BLD AUTO: 4.35 MILLION/UL (ref 3.81–5.12)
SODIUM SERPL-SCNC: 140 MMOL/L (ref 136–145)
TROPONIN I SERPL-MCNC: <0.02 NG/ML
WBC # BLD AUTO: 10.51 THOUSAND/UL (ref 4.31–10.16)

## 2021-01-27 PROCEDURE — G1004 CDSM NDSC: HCPCS

## 2021-01-27 PROCEDURE — 96375 TX/PRO/DX INJ NEW DRUG ADDON: CPT

## 2021-01-27 PROCEDURE — 99285 EMERGENCY DEPT VISIT HI MDM: CPT | Performed by: EMERGENCY MEDICINE

## 2021-01-27 PROCEDURE — 85379 FIBRIN DEGRADATION QUANT: CPT | Performed by: EMERGENCY MEDICINE

## 2021-01-27 PROCEDURE — 93005 ELECTROCARDIOGRAM TRACING: CPT

## 2021-01-27 PROCEDURE — 71045 X-RAY EXAM CHEST 1 VIEW: CPT

## 2021-01-27 PROCEDURE — 84703 CHORIONIC GONADOTROPIN ASSAY: CPT | Performed by: EMERGENCY MEDICINE

## 2021-01-27 PROCEDURE — 36415 COLL VENOUS BLD VENIPUNCTURE: CPT | Performed by: EMERGENCY MEDICINE

## 2021-01-27 PROCEDURE — 96365 THER/PROPH/DIAG IV INF INIT: CPT

## 2021-01-27 PROCEDURE — 85730 THROMBOPLASTIN TIME PARTIAL: CPT | Performed by: EMERGENCY MEDICINE

## 2021-01-27 PROCEDURE — 80053 COMPREHEN METABOLIC PANEL: CPT | Performed by: EMERGENCY MEDICINE

## 2021-01-27 PROCEDURE — 96367 TX/PROPH/DG ADDL SEQ IV INF: CPT

## 2021-01-27 PROCEDURE — 85025 COMPLETE CBC W/AUTO DIFF WBC: CPT | Performed by: EMERGENCY MEDICINE

## 2021-01-27 PROCEDURE — 84484 ASSAY OF TROPONIN QUANT: CPT | Performed by: EMERGENCY MEDICINE

## 2021-01-27 PROCEDURE — 85610 PROTHROMBIN TIME: CPT | Performed by: EMERGENCY MEDICINE

## 2021-01-27 PROCEDURE — 83690 ASSAY OF LIPASE: CPT | Performed by: EMERGENCY MEDICINE

## 2021-01-27 PROCEDURE — A9540 TC99M MAA: HCPCS

## 2021-01-27 PROCEDURE — 83880 ASSAY OF NATRIURETIC PEPTIDE: CPT | Performed by: EMERGENCY MEDICINE

## 2021-01-27 PROCEDURE — 78580 LUNG PERFUSION IMAGING: CPT

## 2021-01-27 PROCEDURE — 85652 RBC SED RATE AUTOMATED: CPT | Performed by: EMERGENCY MEDICINE

## 2021-01-27 PROCEDURE — 99285 EMERGENCY DEPT VISIT HI MDM: CPT

## 2021-01-27 RX ORDER — METOCLOPRAMIDE HYDROCHLORIDE 5 MG/ML
10 INJECTION INTRAMUSCULAR; INTRAVENOUS ONCE
Status: COMPLETED | OUTPATIENT
Start: 2021-01-27 | End: 2021-01-27

## 2021-01-27 RX ORDER — PREDNISONE 20 MG/1
60 TABLET ORAL DAILY
Qty: 15 TABLET | Refills: 0 | Status: SHIPPED | OUTPATIENT
Start: 2021-01-27 | End: 2021-02-01

## 2021-01-27 RX ORDER — MAGNESIUM SULFATE HEPTAHYDRATE 40 MG/ML
2 INJECTION, SOLUTION INTRAVENOUS ONCE
Status: COMPLETED | OUTPATIENT
Start: 2021-01-27 | End: 2021-01-27

## 2021-01-27 RX ORDER — DIPHENHYDRAMINE HYDROCHLORIDE 50 MG/ML
25 INJECTION INTRAMUSCULAR; INTRAVENOUS ONCE
Status: COMPLETED | OUTPATIENT
Start: 2021-01-27 | End: 2021-01-27

## 2021-01-27 RX ORDER — DEXAMETHASONE SODIUM PHOSPHATE 10 MG/ML
10 INJECTION, SOLUTION INTRAMUSCULAR; INTRAVENOUS ONCE
Status: COMPLETED | OUTPATIENT
Start: 2021-01-27 | End: 2021-01-27

## 2021-01-27 RX ADMIN — DIPHENHYDRAMINE HYDROCHLORIDE 25 MG: 50 INJECTION, SOLUTION INTRAMUSCULAR; INTRAVENOUS at 12:56

## 2021-01-27 RX ADMIN — METOCLOPRAMIDE HYDROCHLORIDE 10 MG: 5 INJECTION INTRAMUSCULAR; INTRAVENOUS at 12:58

## 2021-01-27 RX ADMIN — DEXAMETHASONE SODIUM PHOSPHATE 10 MG: 10 INJECTION, SOLUTION INTRAMUSCULAR; INTRAVENOUS at 12:55

## 2021-01-27 RX ADMIN — SODIUM CHLORIDE 1000 ML: 0.9 INJECTION, SOLUTION INTRAVENOUS at 12:54

## 2021-01-27 RX ADMIN — CAFFEINE AND SODIUM BENZOATE 500 MG: 125 INJECTION, SOLUTION INTRAMUSCULAR; INTRAVENOUS at 13:33

## 2021-01-27 RX ADMIN — MAGNESIUM SULFATE HEPTAHYDRATE 2 G: 40 INJECTION, SOLUTION INTRAVENOUS at 13:00

## 2021-01-27 NOTE — ED PROVIDER NOTES
Pt Name: Ignacia Lam  MRN: [id number]  [place] 1960  Age/Sex: 64 y o  female  Date of evaluation: 1/27/2021  PCP: Sim Miguel MD    [address] [place] [address]    Chief Complaint   Patient presents with    Headache     Lumbar puncture on Monday, persistent headache since  Pt reports b/l upper abd pain and inability to take a deep breath   Shortness of Breath         HPI    64 y o  female presenting with headache  Patient states she has had headache since early December, most recently worsening after lumbar puncture performed on Monday that showed no evidence of infection or bleeding  Patient states that her pain is grown gradually worsening since then, sharp, severe, in both sides of the head, radiating throughout the head, worse with standing upright or with lights or exertion and better lying flat with her eyes closed  Patient has been taking Fioricet as well as Vicodin at home without improvement  She also notes nausea as well as some shortness of breath and pain with deep breath and indicates the right upper quadrant  She denies fever, vomiting, trauma, numbness, weakness, changes in speech or vision, other symptoms    Patient has had headaches in the past but this is more severe than most       HPI      Past Medical and Surgical History    Past Medical History:   Diagnosis Date    Abnormal mammogram     last assessed 6/28/16    Abrasion     last assessed 9/12/16    Acute viral syndrome     last assessed 12/8/17    C  difficile diarrhea     Chronic cystitis     Disease of thyroid gland     Dysuria     last assessed 12/5/16    Endometriosis     Enteritis     last assessed 11/4/14    Fibromyalgia     Gross hematuria     Hypercholesterolemia     resolved 10/29/14    Photophobia     last assessed 9/29/16    Snake bite     last assessed 9/14/15       Past Surgical History:   Procedure Laterality Date    CARPAL TUNNEL RELEASE      CARPAL TUNNEL RELEASE      CHOLECYSTECTOMY      COLONOSCOPY 04/29/2010    ENDOMETRIAL ABLATION      HAND SURGERY Right 08/13/2018    cyst removal    HYSTEROSCOPY W/ ENDOMETRIAL ABLATION      IR LUMBAR PUNCTURE  1/25/2021    KNEE RECONSTRUCTION, MEDIAL PATELLAR FEMORAL LIGAMENT Bilateral     screws removed    KNEE SURGERY Left     LAPAROSCOPY      OVARIAN CYST SURGERY      PLANTAR FASCIECTOMY      left calf    IA COLONOSCOPY FLX DX W/COLLJ SPEC WHEN PFRMD N/A 7/11/2018    Procedure: COLONOSCOPY;  Surgeon: Henrietta Nick MD;  Location: [facility];   Service: Gastroenterology    SHOULDER SURGERY Right     and stem cell surgery on shoulder        Family History   Problem Relation Age of Onset    Kidney disease Mother     Heart disease Mother     Heart disease Father     Cancer Paternal Grandfather     Heart disease Other        Social History     Tobacco Use    Smoking status: Current Some Day Smoker     Packs/day: 0 25     Types: Cigarettes    Smokeless tobacco: Never Used   Substance Use Topics    Alcohol use: No    Drug use: No           Allergies    Allergies   Allergen Reactions    Aminosyn Anaphylaxis    Contrast Dye  [Iodinated Diagnostic Agents] Anaphylaxis    Iohexol Anaphylaxis    Procalamine Anaphylaxis    Dairy Aid [Lactase]      intolerence    Fentanyl Other (See Comments) and Irritability     Skin crawling      Morphine Other (See Comments) and Irritability     Skin crawling     Nsaids GI Intolerance    Amoxicillin Hives    Avocado      Gi intolerence    Cefuroxime Hives    Ciprocinonide [Fluocinolone] Hives    Ciprofloxacin Hives    Codeine      Skin crawling    Eggs Or Egg-Derived Products GI Bleeding     Stomach pains    Erythromycin Hives    Gluten Meal     Macrobid [Nitrofurantoin] Hives    Other     Sulfa Antibiotics Hives    Sumatriptan Hives and Other (See Comments)     Diff breathing    Wheat Bran     Zithromax [Azithromycin] Hives       Home Medications    Prior to Admission medications    Medication Sig Start Date End Date Taking?  Authorizing Provider   acyclovir (ZOVIRAX) 5 % ointment Apply topically every 3 (three) hours 10/20/20   Jesse Casillas MD   Albuterol Sulfate (ProAir RespiClick) 025 (90 Base) MCG/ACT AEPB Inhale 2 puffs 4 (four) times a day as needed (wheezing)  Patient not taking: Reported on 1/15/2021 12/14/20   Ankur Hines DO   aspirin 81 MG tablet Take 1 tablet by mouth daily    Historical Provider, MD   butalbital-acetaminophen-caffeine ([facility]) -40 mg per tablet 1-2 q 4 h prn headache 1/7/21   Jesse Casillas MD   cholecalciferol (VITAMIN D3) 1,000 units tablet Take 1,000 Units by mouth daily    [facility]ical Provider, [facility] 625 mg tablet Take 3 tablets (1,875 mg total) by mouth 2 (two) times a day with meals  Patient taking differently: Take 1,875 mg by mouth daily  1/7/21   Jesse Casillas MD   cyclobenzaprine (FLEXERIL) 10 mg tablet Take 1 tablet (10 mg total) by mouth 3 (three) times a day as needed for muscle spasms 12/30/20   Ankur Hines DO   divalproex sodium (DEPAKOTE ER) 250 mg 24 hr tablet 1 p o  q day 1/15/21   Nadia Johnson MD   Fesoterodine Fumarate ER (Toviaz) 4 MG TB24 Take 1 tablet (4 mg total) by mouth daily 9/17/20   Pia Santiago PA-C   levocetirizine (XYZAL) 5 MG tablet TAKE 1 TABLET BY MOUTH EVERY DAY 12/18/20   Dominga Steele PA-C   levothyroxine 50 mcg tablet Take 1 tablet (50 mcg total) by mouth daily 9/17/20   Dominga Steele PA-C   orphenadrine (NORFLEX) 100 mg tablet Take 100 mg by mouth 2 (two) times a day    Historical Provider, MD   Probiotic Product (PROBIOTIC-10) CAPS Take by mouth     Historical Provider, MD   traMADol (ULTRAM) 50 mg tablet Take 1 tablet (50 mg total) by mouth every 8 (eight) hours as needed for moderate pain 1/26/21   Jesse Casillas MD   valACYclovir (VALTREX) 500 mg tablet Take 1 tablet (500 mg total) by mouth 3 (three) times a day for 7 days  Patient taking differently: Take 500 mg by mouth as needed  10/20/20 1/7/21  Lolis Ford MD   VITAMIN B COMPLEX-C PO Take 250 mg by mouth daily     Historical Provider, MD           Review of Systems    Review of Systems   Constitutional: Negative for activity change, chills and fever  HENT: Negative for drooling and facial swelling  Eyes: Positive for photophobia  Negative for pain, discharge and visual disturbance  Respiratory: Negative for apnea, cough, chest tightness, shortness of breath and wheezing  Cardiovascular: Negative for chest pain and leg swelling  Gastrointestinal: Positive for nausea  Negative for abdominal pain, constipation, diarrhea and vomiting  Genitourinary: Negative for difficulty urinating, dysuria and urgency  Musculoskeletal: Negative for arthralgias, back pain and gait problem  Skin: Negative for color change and rash  Neurological: Positive for headaches  Negative for dizziness, speech difficulty and weakness  Psychiatric/Behavioral: Negative for agitation, behavioral problems and confusion  All other systems reviewed and negative  Physical Exam      ED Triage Vitals   Temperature Pulse Respirations Blood Pressure SpO2   01/27/21 1229 01/27/21 1229 01/27/21 1229 01/27/21 1229 01/27/21 1229   98 7 °F (37 1 °C) (!) 111 20 152/89 99 %      Temp Source Heart Rate Source Patient Position - Orthostatic VS BP Location FiO2 (%)   01/27/21 1229 01/27/21 1530 01/27/21 1229 01/27/21 1229 --   Temporal Monitor Sitting Left arm       Pain Score       01/27/21 1229       8               Physical Exam  Vitals signs and nursing note reviewed  Constitutional:       General: She is in acute distress  Appearance: She is well-developed  HENT:      Head: Normocephalic and atraumatic  Eyes:      Conjunctiva/sclera: Conjunctivae normal       Pupils: Pupils are equal, round, and reactive to light  Neck:      Musculoskeletal: Normal range of motion and neck supple     Cardiovascular:      Rate and Rhythm: Regular rhythm  Tachycardia present  Heart sounds: Normal heart sounds  Pulmonary:      Effort: Pulmonary effort is normal  No respiratory distress  Breath sounds: Normal breath sounds  No wheezing or rales  Abdominal:      General: There is no distension  Palpations: Abdomen is soft  Tenderness: There is abdominal tenderness  There is no guarding or rebound  Comments: Minimal tenderness in bilateral upper quadrants, negative Langley sign, no rebound or guarding, no pain at McBurney's point  Musculoskeletal: Normal range of motion  General: No deformity  Skin:     General: Skin is warm and dry  Findings: No erythema or rash  Neurological:      Mental Status: She is alert and oriented to person, place, and time  Cranial Nerves: No cranial nerve deficit  Sensory: No sensory deficit  Motor: No weakness  Coordination: Coordination normal       Deep Tendon Reflexes: Reflexes normal       Comments: Cranial nerves 2-12 intact, 5/5 strength in all extremities, normal speech and coordination, normal vision without visual field cuts  2+ Achilles reflexes bilaterally brisk and symmetric  Psychiatric:         Behavior: Behavior normal          Thought Content: Thought content normal          Judgment: Judgment normal               Diagnostic Results  EKG Interpretation    Rate:  98  BPM  Rhythm:  Normal Sinus Rhythm   Axis:  Normal   Intervals: Normal, no blocks, QTc  446 ms  Q waves:  No pathologic Q waves   T waves:  Normal   ST segments:  No significant elevations or depressions     Impression:  Normal sinus rhythm without evidence of acute ischemia or significant arrhythmia      EKG for comparison:  None available    EKG interpreted by me         Labs:    Results Reviewed     Procedure Component Value Units Date/Time    D-dimer, quantitative [[id number]]  (Abnormal) Collected: 01/27/21 1246    Lab Status: Final result Specimen: Blood from Arm, Right Updated: 01/27/21 1353     D-Dimer, Quant 0 92 ug/ml FEU     hCG, qualitative pregnancy [[id number]]  (Normal) Collected: 01/27/21 1246    Lab Status: Final result Specimen: Blood from Arm, Right Updated: 01/27/21 1323     Preg, Serum Negative    NT-BNP PRO [[id number]]  (Normal) Collected: 01/27/21 1246    Lab Status: Final result Specimen: Blood from Arm, Right Updated: 01/27/21 1319     NT-proBNP 53 pg/mL     Lipase [[id number]]  (Normal) Collected: 01/27/21 1246    Lab Status: Final result Specimen: Blood from Arm, Right Updated: 01/27/21 1319     Lipase 125 u/L     Troponin I [[id number]]  (Normal) Collected: 01/27/21 1246    Lab Status: Final result Specimen: Blood from Arm, Right Updated: 01/27/21 1318     Troponin I <0 02 ng/mL     Comprehensive metabolic panel [[id number]]  (Abnormal) Collected: 01/27/21 1246    Lab Status: Final result Specimen: Blood from Arm, Right Updated: 01/27/21 1309     Sodium 140 mmol/L      Potassium 4 0 mmol/L      Chloride 104 mmol/L      CO2 25 mmol/L      ANION GAP 11 mmol/L      BUN 8 mg/dL      Creatinine 0 89 mg/dL      Glucose 99 mg/dL      Calcium 9 9 mg/dL      AST 25 U/L      ALT 70 U/L      Alkaline Phosphatase 238 U/L      Total Protein 7 5 g/dL      Albumin 3 6 g/dL      Total Bilirubin 0 50 mg/dL      eGFR 72 ml/min/1 73sq m     Narrative:      [place] guidelines for Chronic Kidney Disease (CKD):     Stage 1 with normal or high GFR (GFR > 90 mL/min/1 73 square meters)    Stage 2 Mild CKD (GFR = 60-89 mL/min/1 73 square meters)    Stage 3A Moderate CKD (GFR = 45-59 mL/min/1 73 square meters)    Stage 3B Moderate CKD (GFR = 30-44 mL/min/1 73 square meters)    Stage 4 Severe CKD (GFR = 15-29 mL/min/1 73 square meters)    Stage 5 End Stage CKD (GFR <15 mL/min/1 73 square meters)  Note: GFR calculation is accurate only with a steady state creatinine    Protime-INR [[id number]]  (Normal) Collected: 01/27/21 1246    Lab Status: Final result Specimen: Blood from Arm, Right Updated: 01/27/21 1302     Protime 11 9 seconds      INR 0 92    APTT [[id number]]  (Normal) Collected: 01/27/21 1246    Lab Status: Final result Specimen: Blood from Arm, Right Updated: 01/27/21 1302     PTT 26 seconds     CBC and differential [[id number]]  (Abnormal) Collected: 01/27/21 1246    Lab Status: Final result Specimen: Blood from Arm, Right Updated: 01/27/21 1259     WBC 10 51 Thousand/uL      RBC 4 35 Million/uL      Hemoglobin 13 8 g/dL      Hematocrit 43 4 %       fL      MCH 31 7 pg      MCHC 31 8 g/dL      RDW 13 2 %      MPV 10 0 fL      Platelets 428 Thousands/uL      nRBC 0 /100 WBCs      Neutrophils Relative 63 %      Immat GRANS % 1 %      Lymphocytes Relative 28 %      Monocytes Relative 7 %      Eosinophils Relative 1 %      Basophils Relative 0 %      Neutrophils Absolute 6 68 Thousands/µL      Immature Grans Absolute 0 05 Thousand/uL      Lymphocytes Absolute 2 89 Thousands/µL      Monocytes Absolute 0 70 Thousand/µL      Eosinophils Absolute 0 15 Thousand/µL      Basophils Absolute 0 04 Thousands/µL     Sedimentation rate, automated [[id number]]  (Abnormal) Collected: 01/27/21 1247    Lab Status: Final result Specimen: Blood from Arm, Right Updated: 01/27/21 1258     Sed Rate 72 mm/hour     Narrative:      New method- Test performed using  automated Rheology Technology  If following a patient's inflammatory disease during treatment, a new baseline should be established  All labs reviewed and utilized in the medical decision making process    Radiology:    NM lung perfusion imaging (particulate)   Final Result      The probability for pulmonary embolus is low  Workstation performed: [id number]         XR chest 1 view portable   Final Result      No acute cardiopulmonary disease                    Workstation performed: [id number]             All radiology studies independently viewed by me and interpreted by the radiologist     Procedure    Procedures        ED Course of Care and Re-Assessments      Headache resolved with IV fluids, Reglan, Benadryl, dexamethasone, magnesium, caffeine  D-dimer sent with concern for possible PE based on pleuritic chest pain as well as shortness of breath and initial tachycardia as well as recent immobility related by patient due to lying flat from pain of headache  D-dimer returned elevated but patient has anaphylaxis to IV contrast, prompting V/Q scan, with some delay occasioned by need for retrieving the isotope from remote location  V/Q returned low probability as above, low suspicion for PE at this time  Substantially Elevated erythrocyte sedimentation rate as well as chronicity of headaches concerning for possible temporal arteritis, patient counseled regarding the possibility and started on steroids  Medications   sodium chloride 0 9 % bolus 1,000 mL (0 mL Intravenous Stopped 1/27/21 1443)   metoclopramide (REGLAN) injection 10 mg (10 mg Intravenous Given 1/27/21 1258)   diphenhydrAMINE (BENADRYL) injection 25 mg (25 mg Intravenous Given 1/27/21 1256)   dexamethasone (PF) (DECADRON) injection 10 mg (10 mg Intravenous Given 1/27/21 1255)   magnesium sulfate 2 g/50 mL IVPB (premix) 2 g (0 g Intravenous Stopped 1/27/21 1332)   caffeine-sodium benzoate 500 mg in sodium chloride 0 9 % 1,000 mL IVPB (0 mg Intravenous Stopped 1/27/21 1443)           FINAL IMPRESSION    Final diagnoses:   Chronic nonintractable headache, unspecified headache type   ESR raised         DISPOSITION/PLAN    Acute exacerbation of chronic headache as well as elevated ESR as above  Vital signs reassuring other than initial tachycardia resolved with pain control, examination likewise reassuring with nonfocal neurologic exam is benign abdomen  Labs as above remarkable for elevated D-dimer significantly elevated sed rate    Low suspicion for intracranial hemorrhage, sepsis, meningitis, encephalitis, critically increased ICP, other acute life threat at this time  At this time, some concern for temporal arteritis based on elevated sed rate, possibly exacerbated by post LP headache  Other primary headache not excluded at this time  Patient treated symptomatically with substantial improvement of symptoms, discharged strict return precautions, follow up closely with Neurology and primary care doctor for further workup and also referred to General surgery for temporal artery biopsy  Hemodynamically stable and comfortable at time of discharge  Time reflects when diagnosis was documented in both MDM as applicable and the Disposition within this note     Time User Action Codes Description Comment    1/27/2021  6:18 PM Quinn Pickard Add [R51 9,  G89 29] Chronic nonintractable headache, unspecified headache type     1/27/2021  6:18 PM Quinn Pickard Add [R70 0] ESR raised       ED Disposition     ED Disposition Condition Date/Time Comment    Discharge Stable Wed Jan 27, 2021  6:18 PM Knute Person discharge to home/self care  Follow-up Information     Follow up With Specialties Details Why Contact Info Additional [address] [facility] Emergency Department Emergency Medicine Go to  If symptoms worsen 34 [facility] [id number] [facility] Emergency Department, [address] [place] [address], [facility] Mak Burrows MD Family Medicine Call  As needed 25 [facility] [address] [facility]       Mabel Buitrago MD Neurology Call in 1 day To discuss this visit and schedule close outpatient follow-up  Please discuss rule out for temporal arteritis  [place] [facility] Call in 1 day To discuss obtaining a temporal artery biopsy to help rule out temporal arteritis   [place] [id number]  [facility] 18, [address] [facility]  917 [facility], [place], [place], [address]            PATIENT REFERRED TO:    [address] [facility] Emergency Department  [address] [facility] [address] [phone]  Go to   If symptoms worsen    Valente Cervantes MD  [address] [address]  [phone]    Call   As needed    Belgica Noble MD  3 [facility] [address] [address] [phone]    Call in 1 day  To discuss this visit and schedule close outpatient follow-up  Please discuss rule out for temporal arteritis  [address] [facility]  [address] [facility] [place] [address] [phone]  Call in 1 day  To discuss obtaining a temporal artery biopsy to help rule out temporal arteritis        DISCHARGE MEDICATIONS:    Discharge Medication List as of 1/27/2021  6:22 PM      START taking these medications    Details   predniSONE 20 mg tablet Take 3 tablets (60 mg total) by mouth daily for 5 days, Starting Wed 1/27/2021, Until Mon 2/1/2021, Print         CONTINUE these medications which have NOT CHANGED    Details   acyclovir (ZOVIRAX) 5 % ointment Apply topically every 3 (three) hours, Starting Tue 10/20/2020, Normal      Albuterol Sulfate (ProAir RespiClick) 452 (90 Base) MCG/ACT AEPB Inhale 2 puffs 4 (four) times a day as needed (wheezing), Starting Mon 12/14/2020, Normal      aspirin 81 MG tablet Take 1 tablet by mouth daily, Historical Med      butalbital-acetaminophen-caffeine (FIORICET,ESGIC) -40 mg per tablet 1-2 q 4 h prn headache, Normal      cholecalciferol (VITAMIN D3) 1,000 units tablet Take 1,000 Units by mouth daily, Historical Med      colesevelam (WELCHOL) 625 mg tablet Take 3 tablets (1,875 mg total) by mouth 2 (two) times a day with meals, Starting Thu 1/7/2021, Normal      cyclobenzaprine (FLEXERIL) 10 mg tablet Take 1 tablet (10 mg total) by mouth 3 (three) times a day as needed for muscle spasms, Starting Wed 12/30/2020, Normal      divalproex sodium (DEPAKOTE ER) 250 mg 24 hr tablet 1 p o  q day, Normal      Fesoterodine Fumarate ER (Toviaz) 4 MG TB24 Take 1 tablet (4 mg total) by mouth daily, Starting Thu 9/17/2020, Normal      levocetirizine (XYZAL) 5 MG tablet TAKE 1 TABLET BY MOUTH EVERY DAY, Normal      levothyroxine 50 mcg tablet Take 1 tablet (50 mcg total) by mouth daily, Starting Thu 9/17/2020, Normal      orphenadrine (NORFLEX) 100 mg tablet Take 100 mg by mouth 2 (two) times a day, Historical Med      Probiotic Product (PROBIOTIC-10) CAPS Take by mouth , Historical Med      traMADol (ULTRAM) 50 mg tablet Take 1 tablet (50 mg total) by mouth every 8 (eight) hours as needed for moderate pain, Starting Tue 1/26/2021, Normal      valACYclovir (VALTREX) 500 mg tablet Take 1 tablet (500 mg total) by mouth 3 (three) times a day for 7 days, Starting Tue 10/20/2020, Until Thu 1/7/2021, Normal      VITAMIN B COMPLEX-C PO Take 250 mg by mouth daily , Historical Med             No discharge procedures on file           MD Timbo Peralta MD  01/27/21 9038

## 2021-01-28 ENCOUNTER — TELEPHONE (OUTPATIENT)
Dept: FAMILY MEDICINE CLINIC | Facility: CLINIC | Age: 61
End: 2021-01-28

## 2021-01-28 ENCOUNTER — TELEPHONE (OUTPATIENT)
Dept: OTHER | Facility: OTHER | Age: 61
End: 2021-01-28

## 2021-01-28 ENCOUNTER — PREP FOR PROCEDURE (OUTPATIENT)
Dept: SURGERY | Facility: CLINIC | Age: 61
End: 2021-01-28

## 2021-01-28 ENCOUNTER — HOSPITAL ENCOUNTER (OUTPATIENT)
Dept: VASCULAR ULTRASOUND | Facility: HOSPITAL | Age: 61
Discharge: HOME | End: 2021-01-28
Payer: COMMERCIAL

## 2021-01-28 DIAGNOSIS — R51.9 ACUTE INTRACTABLE HEADACHE, UNSPECIFIED HEADACHE TYPE: ICD-10-CM

## 2021-01-28 DIAGNOSIS — R51.9 ACUTE INTRACTABLE HEADACHE, UNSPECIFIED HEADACHE TYPE: Primary | ICD-10-CM

## 2021-01-28 DIAGNOSIS — G43.511 INTRACTABLE PERSISTENT MIGRAINE AURA WITHOUT CEREBRAL INFARCTION AND WITH STATUS MIGRAINOSUS: Primary | ICD-10-CM

## 2021-01-28 LAB
ATRIAL RATE: 98 BPM
BACTERIA CSF CULT: NO GROWTH
P AXIS: 73 DEGREES
PR INTERVAL: 130 MS
QRS AXIS: 40 DEGREES
QRSD INTERVAL: 74 MS
QT INTERVAL: 350 MS
QTC INTERVAL: 446 MS
T WAVE AXIS: 73 DEGREES
VENTRICULAR RATE: 98 BPM

## 2021-01-28 PROCEDURE — 93010 ELECTROCARDIOGRAM REPORT: CPT | Performed by: INTERNAL MEDICINE

## 2021-01-28 PROCEDURE — 93882 EXTRACRANIAL UNI/LTD STUDY: CPT

## 2021-01-28 PROCEDURE — 93886 INTRACRANIAL COMPLETE STUDY: CPT | Performed by: SURGERY

## 2021-01-28 PROCEDURE — 99202 OFFICE O/P NEW SF 15 MIN: CPT | Performed by: SURGERY

## 2021-01-28 RX ORDER — CHLORHEXIDINE GLUCONATE 0.12 MG/ML
15 RINSE ORAL ONCE
Status: CANCELLED | OUTPATIENT
Start: 2021-01-28 | End: 2021-01-28

## 2021-01-28 NOTE — TELEPHONE ENCOUNTER
Pt called and was in the E R yesterday and the doctor suggested pt surgical biopsy of temporal artey within 5-days of her visit from the E R  Pt has possible have her procedure done tomorrow and the surgeon Dr Ami Winn needs to speak with you ASAP to discuss pt case    Pt [phone]

## 2021-01-28 NOTE — TELEPHONE ENCOUNTER
I spoke with patient who was in the emergency room last night and had a sedimentation rate done which was high at 72  She has contacted general surgery Dr Last Meth office and is scheduled for temporal artery biopsy with Dr Brandon Crowe on Monday  She was told with request the procedure could be moved up to tomorrow  Have recommended an ordered a stat temporal artery ultrasound and have reached out to Dr Brandon Crowe reports that she does not have room on her schedule tomorrow however 1 of her associates does and she will try to coordinate this  Have communicated all this with the patient  She reports that she was not on 80 mg of prednisone but 40 mg of prednisone for 3 days followed by 30 mg for 3 days  She was started on 60 mg of prednisone yesterday    She understands that steroids may alter biopsy results ( the ones taken earlier this month)

## 2021-01-28 NOTE — TELEPHONE ENCOUNTER
Spoke with pt, she is scheduled for BIOPSY ARTERY TEMPORAL tomorrow 1/29/21  She does not needs anything for now

## 2021-01-28 NOTE — TELEPHONE ENCOUNTER
STANLEY  Spoke with Dr Hannon office and they are in communication with Dr Judy Bhagat office in getting the pt procedure schedule for tomorrow,  KIMBER Hannon at this time doesn't need anything from    [phone]

## 2021-01-28 NOTE — PRE-PROCEDURE INSTRUCTIONS
Pre-Surgery Instructions:   Medication Instructions    aspirin 81 MG tablet hold till after surgery    cholecalciferol (VITAMIN D3) 1,000 units tablet hold till after surgery    Fesoterodine Fumarate ER (Toviaz) 4 MG TB24 hold day of surgery    levocetirizine (XYZAL) 5 MG tablet Hold day of surgery    levothyroxine 50 mcg tablet take day of surgery    orphenadrine (NORFLEX) 100 mg tablet hold day of surgery    predniSONE 20 mg tablet hold day of surgery    Probiotic Product (PROBIOTIC-10) CAPS hold till after surgery    traMADol (ULTRAM) 50 mg tablet take if needed    VITAMIN B COMPLEX-C PO hold till after surgery    My Surgical Experience    The following information was developed to assist you to prepare for your operation  What do I need to do before coming to the hospital?   Arrange for a responsible person to drive you to and from the hospital    Arrange care for your children at home  Children are not allowed in the recovery areas of the hospital   Plan to wear clothing that is easy to put on and take off  If you are having shoulder surgery, wear a shirt that buttons or zippers in the front  Bathing  o Shower the evening before and the morning of your surgery with an antibacterial soap  Please refer to the Pre Op Showering Instructions for Surgery Patients Sheet   o Remove nail polish and all body piercing jewelry  o Do not shave any body part for at least 24 hours before surgery-this includes face, arms, legs and upper body  Food  o Nothing to eat or drink after midnight the night before your surgery   This includes candy and chewing gum  o Exception: If your surgery is after 12:00pm (noon), you may have clear liquids such as 7-Up®, ginger ale, apple or cranberry juice, Jell-O®, water, or clear broth until 8:00 am  o Do not drink milk or juice with pulp on the morning before surgery  o Do not drink alcohol 24 hours before surgery  Medicine  o Follow instructions you received from your surgeon about which medicines you may take on the day of surgery  o If instructed to take medicine on the morning of surgery, take pills with just a small sip of water  Call your prescribing doctor for specific infroamtion on what to do if you take insulin    What should I bring to the hospital?    Bring:  [place] or a walker, if you have them, for foot or knee surgery   A list of the daily medicines, vitamins, minerals, herbals and nutritional supplements you take  Include the dosages of medicines and the time you take them each day   Glasses, dentures or hearing aids   Minimal clothing; you will be wearing hospital sleepwear   Photo ID; required to verify your identity   If you have a Living Will or Power of , bring a copy of the documents   If you have an ostomy, bring an extra pouch and any supplies you use    Do not bring   Medicines or inhalers   Money, valuables or jewelry    What other information should I know about the day of surgery?  Notify your surgeons if you develop a cold, sore throat, cough, fever, rash or any other illness   Report to the Ambulatory Surgical/Same Day Surgery Unit   You will be instructed to stop at Registration only if you have not been pre-registered   Inform your  fi they do not stay that they will be asked by the staff to leave a phone number where they can be reached   Be available to be reached before surgery  In the event the operating room schedule changes, you may be asked to come in earlier or later than expected    *It is important to tell your doctor and others involved in your health care if you are taking or have been taking any non-prescription drugs, vitamins, minerals, herbals or other nutritional supplements   Any of these may interact with some food or medicines and cause a reaction

## 2021-01-28 NOTE — PROGRESS NOTES
Assessment/Plan:       1  Intractable persistent migraine 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 with status migrainosus      Dr Drake Leavitt has agreed to perform urgent TA biopsy at the request of her neurologist Dr Lela Hood  He will perform H&P in preop  Subjective:      Patient ID: Brooklynn Carreon is a 64 y o  female  Triage Notes:    HPI    The following portions of the patient's history were reviewed and updated as appropriate:   She  has a past medical history of Abnormal mammogram, Abrasion, Acute viral syndrome, C  difficile diarrhea, Chronic cystitis, Disease of thyroid gland, Dysuria, Endometriosis, Enteritis, Fibromyalgia, Gross hematuria, Hypercholesterolemia, Photophobia, and Snake bite  She   Patient Active Problem List    Diagnosis Date Noted    Intractable persistent migraine 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 with status migrainosus 01/07/2021    Viral syndrome 10/09/2018    Left lower quadrant pain 10/09/2018    Abnormal MRI, lumbar spine 10/09/2018    Nausea 06/20/2018    Bloating 06/20/2018    Abdominal pain 06/20/2018    Screening for colon cancer 06/20/2018    Irritable bowel syndrome with diarrhea 06/20/2018    Diarrhea 04/03/2018    Right hand pain 02/08/2018    Abnormal liver enzymes 11/10/2017    Asthma without status asthmaticus 11/08/2014    Mixed hyperlipidemia 11/04/2014    Degeneration of lumbar intervertebral disc 11/04/2014    Seasonal allergies 10/29/2014    Hypothyroidism 10/29/2014    Menopause ovarian failure 10/18/2014     She  has a past surgical history that includes Carpal tunnel release; Plantar fasciectomy; Knee reconstruction, medial patellar femoral ligament (Bilateral); Shoulder surgery (Right); Carpal tunnel release; Endometrial ablation; Cholecystectomy; LAPAROSCOPY; pr colonoscopy flx dx w/collj spec when pfrmd (N/A, 7/11/2018); Colonoscopy (04/29/2010); Hysteroscopy w/ endometrial ablation; Knee surgery (Left);  Ovarian cyst surgery; Hand surgery (Right, 08/13/2018); and IR lumbar puncture (1/25/2021)  Her family history includes Cancer in her paternal grandfather; Heart disease in her father, mother, and other; Kidney disease in her mother  She  reports that she has been smoking cigarettes  She has been smoking about 0 25 packs per day  She has never used smokeless tobacco  She reports that she does not drink alcohol or use drugs    Current Outpatient Medications on File Prior to Visit   Medication Sig    acyclovir (ZOVIRAX) 5 % ointment Apply topically every 3 (three) hours    Albuterol Sulfate (ProAir RespiClick) 921 (90 Base) MCG/ACT AEPB Inhale 2 puffs 4 (four) times a day as needed (wheezing) (Patient not taking: Reported on 1/15/2021)    aspirin 81 MG tablet Take 1 tablet by mouth daily    butalbital-acetaminophen-caffeine (FIORICET,ESGIC) -40 mg per tablet 1-2 q 4 h prn headache    cholecalciferol (VITAMIN D3) 1,000 units tablet Take 1,000 Units by mouth daily    colesevelam (WELCHOL) 625 mg tablet Take 3 tablets (1,875 mg total) by mouth 2 (two) times a day with meals (Patient taking differently: Take 1,875 mg by mouth daily )    cyclobenzaprine (FLEXERIL) 10 mg tablet Take 1 tablet (10 mg total) by mouth 3 (three) times a day as needed for muscle spasms    divalproex sodium (DEPAKOTE ER) 250 mg 24 hr tablet 1 p o  q day    Fesoterodine Fumarate ER (Toviaz) 4 MG TB24 Take 1 tablet (4 mg total) by mouth daily    levocetirizine (XYZAL) 5 MG tablet TAKE 1 TABLET BY MOUTH EVERY DAY    levothyroxine 50 mcg tablet Take 1 tablet (50 mcg total) by mouth daily    orphenadrine (NORFLEX) 100 mg tablet Take 100 mg by mouth 2 (two) times a day    predniSONE 20 mg tablet Take 3 tablets (60 mg total) by mouth daily for 5 days    Probiotic Product (PROBIOTIC-10) CAPS Take by mouth     traMADol (ULTRAM) 50 mg tablet Take 1 tablet (50 mg total) by mouth every 8 (eight) hours as needed for moderate pain    valACYclovir (VALTREX) 500 mg tablet Take 1 tablet (500 mg total) by mouth 3 (three) times a day for 7 days (Patient taking differently: Take 500 mg by mouth as needed )    VITAMIN B COMPLEX-C PO Take 250 mg by mouth daily      Current Facility-Administered Medications on File Prior to Visit   Medication    [COMPLETED] caffeine-sodium benzoate 500 mg in sodium chloride 0 9 % 1,000 mL IVPB    [COMPLETED] dexamethasone (PF) (DECADRON) injection 10 mg    [COMPLETED] diphenhydrAMINE (BENADRYL) injection 25 mg    [COMPLETED] magnesium sulfate 2 g/50 mL IVPB (premix) 2 g    [COMPLETED] metoclopramide (REGLAN) injection 10 mg    [COMPLETED] sodium chloride 0 9 % bolus 1,000 mL     She is allergic to aminosyn; contrast dye  [iodinated diagnostic agents]; iohexol; procalamine; dairy aid [lactase]; fentanyl; morphine; nsaids; amoxicillin; avocado; cefuroxime; ciprocinonide [fluocinolone]; ciprofloxacin; codeine; eggs or egg-derived products; erythromycin; gluten meal; macrobid [nitrofurantoin]; other; sulfa antibiotics; sumatriptan; wheat bran; and zithromax [azithromycin]       Objective: There were no vitals taken for this visit  Below is the patient's most recent value for Albumin, ALT, AST, BUN, Calcium, Chloride, Cholesterol, CO2, Creatinine, GFR, Glucose, HDL, Hematocrit, Hemoglobin, Hemoglobin A1C, LDL, Magnesium, Phosphorus, Platelets, Potassium, PSA, Sodium, Triglycerides, and WBC  Lab Results   Component Value Date    ALT 70 01/27/2021    AST 25 01/27/2021    BUN 8 01/27/2021    CALCIUM 9 9 01/27/2021     01/27/2021    CO2 25 01/27/2021    CREATININE 0 89 01/27/2021    HDL 32 (L) 06/13/2019    HCT 43 4 01/27/2021    HGB 13 8 01/27/2021    HGBA1C 5 9 06/13/2019     01/27/2021    K 4 0 01/27/2021     10/30/2014    TRIG 219 (H) 06/13/2019    WBC 10 51 (H) 01/27/2021     Note: for a comprehensive list of the patient's lab results, access the Results Review activity        Procedures

## 2021-01-28 NOTE — TELEPHONE ENCOUNTER
"My SED rate is 72 and temporal biopsy needs to get done as soon as possible " Pt would like a returned call as soon as possible from Dr Adam Trammell

## 2021-01-28 NOTE — TELEPHONE ENCOUNTER
Pt was in the ER until last night  "My SED rate is 72 and temporal biopsy needs to get done as soon as possible " Pt would like a returned call as soon as possible from Dr Namita Ansari

## 2021-01-29 ENCOUNTER — HOSPITAL ENCOUNTER (OUTPATIENT)
Facility: HOSPITAL | Age: 61
Setting detail: OUTPATIENT SURGERY
Discharge: HOME | End: 2021-01-29
Attending: STUDENT IN AN ORGANIZED HEALTH CARE EDUCATION/TRAINING PROGRAM | Admitting: STUDENT IN AN ORGANIZED HEALTH CARE EDUCATION/TRAINING PROGRAM
Payer: COMMERCIAL

## 2021-01-29 ENCOUNTER — ANESTHESIA (OUTPATIENT)
Dept: PERIOP | Facility: HOSPITAL | Age: 61
End: 2021-01-29
Payer: COMMERCIAL

## 2021-01-29 ENCOUNTER — ANESTHESIA EVENT (OUTPATIENT)
Dept: PERIOP | Facility: HOSPITAL | Age: 61
End: 2021-01-29
Payer: COMMERCIAL

## 2021-01-29 ENCOUNTER — TELEPHONE (OUTPATIENT)
Dept: SURGERY | Facility: CLINIC | Age: 61
End: 2021-01-29

## 2021-01-29 ENCOUNTER — HOSPITAL ENCOUNTER (EMERGENCY)
Facility: HOSPITAL | Age: 61
Discharge: HOME | End: 2021-01-29
Attending: EMERGENCY MEDICINE
Payer: COMMERCIAL

## 2021-01-29 VITALS — HEART RATE: 69 BPM

## 2021-01-29 VITALS
SYSTOLIC BLOOD PRESSURE: 129 MMHG | DIASTOLIC BLOOD PRESSURE: 69 MMHG | OXYGEN SATURATION: 97 % | WEIGHT: 169.09 LBS | TEMPERATURE: 97.2 F | BODY MASS INDEX: 31.93 KG/M2 | RESPIRATION RATE: 16 BRPM | HEART RATE: 73 BPM | HEIGHT: 61 IN

## 2021-01-29 VITALS
OXYGEN SATURATION: 98 % | WEIGHT: 169 LBS | DIASTOLIC BLOOD PRESSURE: 60 MMHG | HEIGHT: 61 IN | BODY MASS INDEX: 31.91 KG/M2 | RESPIRATION RATE: 18 BRPM | TEMPERATURE: 97.7 F | SYSTOLIC BLOOD PRESSURE: 135 MMHG | HEART RATE: 78 BPM

## 2021-01-29 DIAGNOSIS — T78.40XA ALLERGIC REACTION: Primary | ICD-10-CM

## 2021-01-29 DIAGNOSIS — R51.9 ACUTE INTRACTABLE HEADACHE, UNSPECIFIED HEADACHE TYPE: Primary | ICD-10-CM

## 2021-01-29 PROCEDURE — 88305 TISSUE EXAM BY PATHOLOGIST: CPT | Performed by: PATHOLOGY

## 2021-01-29 PROCEDURE — 99283 EMERGENCY DEPT VISIT LOW MDM: CPT

## 2021-01-29 PROCEDURE — 99284 EMERGENCY DEPT VISIT MOD MDM: CPT | Performed by: EMERGENCY MEDICINE

## 2021-01-29 PROCEDURE — NC001 PR NO CHARGE: Performed by: STUDENT IN AN ORGANIZED HEALTH CARE EDUCATION/TRAINING PROGRAM

## 2021-01-29 PROCEDURE — 37609 LIGATION/BX TEMPORAL ARTERY: CPT | Performed by: STUDENT IN AN ORGANIZED HEALTH CARE EDUCATION/TRAINING PROGRAM

## 2021-01-29 RX ORDER — SODIUM CHLORIDE, SODIUM LACTATE, POTASSIUM CHLORIDE, CALCIUM CHLORIDE 600; 310; 30; 20 MG/100ML; MG/100ML; MG/100ML; MG/100ML
50 INJECTION, SOLUTION INTRAVENOUS CONTINUOUS
Status: DISCONTINUED | OUTPATIENT
Start: 2021-01-29 | End: 2021-01-29 | Stop reason: HOSPADM

## 2021-01-29 RX ORDER — PROPOFOL 10 MG/ML
INJECTION, EMULSION INTRAVENOUS CONTINUOUS PRN
Status: DISCONTINUED | OUTPATIENT
Start: 2021-01-29 | End: 2021-01-29

## 2021-01-29 RX ORDER — PROPOFOL 10 MG/ML
INJECTION, EMULSION INTRAVENOUS AS NEEDED
Status: DISCONTINUED | OUTPATIENT
Start: 2021-01-29 | End: 2021-01-29

## 2021-01-29 RX ORDER — MIDAZOLAM HYDROCHLORIDE 2 MG/2ML
INJECTION, SOLUTION INTRAMUSCULAR; INTRAVENOUS AS NEEDED
Status: DISCONTINUED | OUTPATIENT
Start: 2021-01-29 | End: 2021-01-29

## 2021-01-29 RX ORDER — CHLORHEXIDINE GLUCONATE 0.12 MG/ML
15 RINSE ORAL ONCE
Status: COMPLETED | OUTPATIENT
Start: 2021-01-29 | End: 2021-01-29

## 2021-01-29 RX ORDER — TRAMADOL HYDROCHLORIDE 50 MG/1
50 TABLET ORAL EVERY 6 HOURS PRN
Qty: 12 TABLET | Refills: 0 | Status: SHIPPED | OUTPATIENT
Start: 2021-01-29 | End: 2021-02-08

## 2021-01-29 RX ORDER — TRAMADOL HYDROCHLORIDE 50 MG/1
50 TABLET ORAL EVERY 6 HOURS PRN
Status: DISCONTINUED | OUTPATIENT
Start: 2021-01-29 | End: 2021-01-29 | Stop reason: HOSPADM

## 2021-01-29 RX ORDER — DOCUSATE SODIUM 100 MG/1
100 CAPSULE, LIQUID FILLED ORAL 3 TIMES DAILY PRN
Qty: 30 EACH | Refills: 0 | Status: SHIPPED | OUTPATIENT
Start: 2021-01-29 | End: 2021-04-12

## 2021-01-29 RX ORDER — CLINDAMYCIN PHOSPHATE 900 MG/50ML
900 INJECTION INTRAVENOUS ONCE
Status: COMPLETED | OUTPATIENT
Start: 2021-01-29 | End: 2021-01-29

## 2021-01-29 RX ORDER — KETAMINE HYDROCHLORIDE 50 MG/ML
INJECTION, SOLUTION, CONCENTRATE INTRAMUSCULAR; INTRAVENOUS AS NEEDED
Status: DISCONTINUED | OUTPATIENT
Start: 2021-01-29 | End: 2021-01-29

## 2021-01-29 RX ORDER — DIPHENHYDRAMINE HYDROCHLORIDE 50 MG/ML
12.5 INJECTION INTRAMUSCULAR; INTRAVENOUS ONCE AS NEEDED
Status: DISCONTINUED | OUTPATIENT
Start: 2021-01-29 | End: 2021-01-29 | Stop reason: HOSPADM

## 2021-01-29 RX ORDER — MAGNESIUM HYDROXIDE 1200 MG/15ML
LIQUID ORAL AS NEEDED
Status: DISCONTINUED | OUTPATIENT
Start: 2021-01-29 | End: 2021-01-29 | Stop reason: HOSPADM

## 2021-01-29 RX ORDER — KETOROLAC TROMETHAMINE 30 MG/ML
15 INJECTION, SOLUTION INTRAMUSCULAR; INTRAVENOUS ONCE
Status: COMPLETED | OUTPATIENT
Start: 2021-01-29 | End: 2021-01-29

## 2021-01-29 RX ORDER — LIDOCAINE HYDROCHLORIDE 10 MG/ML
0.5 INJECTION, SOLUTION EPIDURAL; INFILTRATION; INTRACAUDAL; PERINEURAL ONCE AS NEEDED
Status: DISCONTINUED | OUTPATIENT
Start: 2021-01-29 | End: 2021-01-29 | Stop reason: HOSPADM

## 2021-01-29 RX ORDER — DEXMEDETOMIDINE HYDROCHLORIDE 100 UG/ML
INJECTION, SOLUTION INTRAVENOUS AS NEEDED
Status: DISCONTINUED | OUTPATIENT
Start: 2021-01-29 | End: 2021-01-29

## 2021-01-29 RX ORDER — ONDANSETRON 2 MG/ML
4 INJECTION INTRAMUSCULAR; INTRAVENOUS ONCE AS NEEDED
Status: DISCONTINUED | OUTPATIENT
Start: 2021-01-29 | End: 2021-01-29 | Stop reason: HOSPADM

## 2021-01-29 RX ADMIN — PROPOFOL 200 MG: 10 INJECTION, EMULSION INTRAVENOUS at 13:11

## 2021-01-29 RX ADMIN — TRAMADOL HYDROCHLORIDE 50 MG: 50 TABLET, FILM COATED ORAL at 15:08

## 2021-01-29 RX ADMIN — CHLORHEXIDINE GLUCONATE 0.12% ORAL RINSE 15 ML: 1.2 LIQUID ORAL at 11:45

## 2021-01-29 RX ADMIN — PHENYLEPHRINE HYDROCHLORIDE 100 MCG: 10 INJECTION INTRAVENOUS at 13:39

## 2021-01-29 RX ADMIN — KETAMINE HYDROCHLORIDE 20 MG: 50 INJECTION INTRAMUSCULAR; INTRAVENOUS at 13:21

## 2021-01-29 RX ADMIN — CLINDAMYCIN IN 5 PERCENT DEXTROSE 900 MG: 18 INJECTION, SOLUTION INTRAVENOUS at 12:34

## 2021-01-29 RX ADMIN — PHENYLEPHRINE HYDROCHLORIDE 100 MCG: 10 INJECTION INTRAVENOUS at 13:32

## 2021-01-29 RX ADMIN — SODIUM CHLORIDE, SODIUM LACTATE, POTASSIUM CHLORIDE, AND CALCIUM CHLORIDE: .6; .31; .03; .02 INJECTION, SOLUTION INTRAVENOUS at 12:00

## 2021-01-29 RX ADMIN — KETOROLAC TROMETHAMINE 15 MG: 30 INJECTION, SOLUTION INTRAMUSCULAR at 14:30

## 2021-01-29 RX ADMIN — LIDOCAINE HYDROCHLORIDE 100 ML: 20 INJECTION, SOLUTION INTRAVENOUS at 13:11

## 2021-01-29 RX ADMIN — MIDAZOLAM HYDROCHLORIDE 2 MG: 1 INJECTION, SOLUTION INTRAMUSCULAR; INTRAVENOUS at 13:07

## 2021-01-29 RX ADMIN — DEXMEDETOMIDINE HCL 12 MCG: 100 INJECTION INTRAVENOUS at 13:17

## 2021-01-29 RX ADMIN — DEXAMETHASONE SODIUM PHOSPHATE 10 MG: 10 INJECTION, SOLUTION INTRAMUSCULAR; INTRAVENOUS at 21:58

## 2021-01-29 RX ADMIN — PROPOFOL 130 MCG/KG/MIN: 10 INJECTION, EMULSION INTRAVENOUS at 13:12

## 2021-01-29 RX ADMIN — DEXMEDETOMIDINE HCL 8 MCG: 100 INJECTION INTRAVENOUS at 13:25

## 2021-01-29 NOTE — ANESTHESIA PREPROCEDURE EVALUATION
Procedure:  BIOPSY ARTERY TEMPORAL (N/A Head)    Relevant Problems   CARDIO   (+) Mixed hyperlipidemia      ENDO   (+) Hypothyroidism      MUSCULOSKELETAL   (+) Degeneration of lumbar intervertebral disc      PULMONARY   (+) Asthma without status asthmaticus        Physical Exam    Airway    Mallampati score: II  TM Distance: >3 FB  Neck ROM: full     Dental   No notable dental hx     Cardiovascular      Pulmonary      Other Findings        Anesthesia Plan  ASA Score- 2     Anesthesia Type- IV sedation with anesthesia with ASA Monitors  Additional Monitors:   Airway Plan:     Comment: I discussed the risks and benefits of IV sedation anesthesia including the possibility of the need to convert to general anesthesia and the potential risk of awareness  Plan Factors-    Chart reviewed  EKG reviewed  Existing labs reviewed  Patient summary reviewed  Induction- intravenous  Postoperative Plan-     Informed Consent- Anesthetic plan and risks discussed with patient  Patient presents for temporal artery biopsy to rule out temporal arteritis in the setting of constant headache with mild photophobia since early December  She underwent LP on 1/25  Headache has since become more severe  Her headache is now worse on standing or sitting up  Headache improves with caffeine  She reports tinnitus, but states it is chronic  She also had abdominal pain and pleuritic chest pain after LP, since resolved  She is found sitting up in pre-operative area, anxious-appearing and tearful  ESR elevated 1/27 at 72  Plan as above to facilitate temporal artery biopsy  Consideration for dural puncture component  Continue trial of conservative management for any low-pressure component, to include caffeine and fioricet  She is on steroids

## 2021-01-29 NOTE — TELEPHONE ENCOUNTER
Discussed results of temporal artery ultrasound last evening around 7:00 p m  with patient  She question whether biopsy was still necessary  With further reading and discussion with colleagues have recommended to proceed with biopsy when I discussed this with her this morning  Will also consider trying to coordinate blood patch given her persistent low-pressure headache

## 2021-01-29 NOTE — H&P
H&P Exam - General Surgery   Darcy Jones 64 y o  female MRN: [id number]  Unit/Bed#: OR [place] Encounter: [id number]    Assessment/Plan     Darcy Jones is a 64 y o  female    82-year-old female with headache, concern for temporal arteritis  -will plan for left-sided temporal artery biopsy, possible right  -patient states she has more pain on the left side  -consent obtained    All risks and benefits of the procedure were explained to the patient questions were answered to satisfaction  The patient voiced understanding and signed consent  History of Present Illness   HPI:  Darcy Jones is a 64 y o  female who presents with headaches  Patient states on December 9th she had symptoms of fever chills and felt like she had a viral infection  This also came with a headache  Since this time the headache is not gone away  Describes it like it had been going over the top of her head from ear to ear  States it is an 8/10 and constant  Has never had this before  patient denies any vision changes  Patient was sent for temporal artery biopsy by Neurology      Review of Systems     Constitutional: Denies weight change, fever, chills, night sweats, fatigue  HEENT: Denies hearing change, vision change, nasal congestion, sore throat; positive headache  Cardiovascular: Denies chest pain, shortness of breath, dyspnea on exertion  Respiratory: Denies cough, dyspnea  Gastrointestinal: Denies nausea, vomiting, abdominal pain; Positive bowel movement, flatus  Genitourinary: Denies dysuria, hematuria  Musculoskeletal: Denies arthralgias, myalgias  Neuro: Denies weakness, numbness, loss of consciousness  Heme: Denies easy bruising, bleeding  Endocrine: Denies polyuria, polydipsia    Historical Information   Past Medical History:   Diagnosis Date    Abnormal mammogram     last assessed 6/28/16    Abrasion     last assessed 9/12/16    Acute viral syndrome     last assessed 12/8/17    C  difficile diarrhea     Chronic cystitis     Disease of thyroid gland     Dysuria     last assessed 12/5/16    Endometriosis     Enteritis     last assessed 11/4/14    Fatty liver     Fibromyalgia     Fibromyalgia, primary     Gross hematuria     Hypercholesterolemia     resolved 10/29/14    Photophobia     last assessed 9/29/16    Pneumonia     Pyelonephritis, acute     Snake bite     last assessed 9/14/15     Past Surgical History:   Procedure Laterality Date    CARPAL TUNNEL RELEASE      CARPAL TUNNEL RELEASE      CHOLECYSTECTOMY      COLONOSCOPY  04/29/2010    ENDOMETRIAL ABLATION      FOOT SURGERY Bilateral     HAND SURGERY Right 08/13/2018    cyst removal    HYSTEROSCOPY W/ ENDOMETRIAL ABLATION      IR LUMBAR PUNCTURE  1/25/2021    KNEE RECONSTRUCTION, MEDIAL PATELLAR FEMORAL LIGAMENT Bilateral     screws removed    KNEE SURGERY Left     LAPAROSCOPY      OVARIAN CYST SURGERY      PLANTAR FASCIECTOMY      left calf    CO COLONOSCOPY FLX DX W/COLLJ SPEC WHEN PFRMD N/A 7/11/2018    Procedure: COLONOSCOPY;  Surgeon: Mio Felix MD;  Location: [facility];   Service: Gastroenterology    SHOULDER SURGERY Right     and stem cell surgery on shoulder      Social History   Social History     Substance and Sexual Activity   Alcohol Use No     Social History     Substance and Sexual Activity   Drug Use No     Social History     Tobacco Use   Smoking Status Current Some Day Smoker    Packs/day: 0 50    Types: Cigarettes   Smokeless Tobacco Never Used     Family History: non-contributory    Meds/Allergies   all medications and allergies reviewed  Allergies   Allergen Reactions    Aminosyn Anaphylaxis    Contrast Dye  [Iodinated Diagnostic Agents] Anaphylaxis    Iohexol Anaphylaxis    Penicillins Hives    Procalamine Anaphylaxis    Dairy Aid [Lactase]      intolerence    Fentanyl Other (See Comments) and Irritability     Skin crawling      Morphine Other (See Comments) and Irritability     Skin crawling  Nsaids GI Intolerance    Amoxicillin Hives    Avocado      Gi intolerence    Cefuroxime Hives    Ciprocinonide [Fluocinolone] Hives    Ciprofloxacin Hives    Codeine      Skin crawling    Eggs Or Egg-Derived Products GI Bleeding     Stomach pains    Erythromycin Hives    Gluten Meal     Macrobid [Nitrofurantoin] Hives    Other     Sulfa Antibiotics Hives    Sumatriptan Hives and Other (See Comments)     Diff breathing    Wheat Bran     Zithromax [Azithromycin] Hives       Objective   First Vitals:   Blood Pressure: 112/66 (01/29/21 1109)  Pulse: 83 (01/29/21 1109)  Temperature: 97 9 °F (36 6 °C) (01/29/21 1109)  Temp Source: Temporal (01/29/21 1109)  Respirations: 14 (01/29/21 1109)  Height: 5' 1" (154 9 cm) (01/29/21 1109)  Weight - Scale: 74 8 kg (165 lb) (01/28/21 1308)  SpO2: 98 % (01/29/21 1109)    Current Vitals:   Blood Pressure: 112/66 (01/29/21 1109)  Pulse: 83 (01/29/21 1109)  Temperature: 97 9 °F (36 6 °C) (01/29/21 1109)  Temp Source: Temporal (01/29/21 1109)  Respirations: 14 (01/29/21 1109)  Height: 5' 1" (154 9 cm) (01/29/21 1109)  Weight - Scale: 76 7 kg (169 lb 1 5 oz) (01/29/21 1109)  SpO2: 98 % (01/29/21 1109)    No intake or output data in the 24 hours ending 01/29/21 1242    Invasive Devices     Peripheral Intravenous Line            Peripheral IV 01/29/21 Right Hand less than 1 day                Physical Exam     Constitutional:       Appearance: Normal appearance  HENT:      Head: Normocephalic and atraumatic  Nose: Nose normal    Eyes:      General: No scleral icterus  Conjunctiva/sclera: Conjunctivae normal    Neck:      Musculoskeletal: Neck supple  Cardiovascular:      Rate and Rhythm: Normal rate and regular rhythm  Pulses: Normal pulses  Heart sounds: Normal heart sounds  Pulmonary:      Effort: Pulmonary effort is normal       Breath sounds: Normal breath sounds  Abdominal:      General: There is no distension        Palpations: Abdomen is soft       Tenderness: There is no abdominal tenderness  Musculoskeletal:         General: No signs of injury  Skin:     General: Skin is warm  Coloration: Skin is not jaundiced  Neurological:      General: No focal deficit present  Mental Status: He is alert and oriented to person, place, and time  Psychiatric:         Mood and Affect: Mood normal          Behavior: Behavior normal      Lab Results: I have personally reviewed pertinent lab results  No results found for this or any previous visit (from the past 36 hour(s))  Imaging: I have personally reviewed pertinent reports  No results found  EKG, Pathology, and Other Studies: I have personally reviewed pertinent reports

## 2021-01-29 NOTE — OP NOTE
OPERATIVE REPORT  PATIENT NAME: Brit Berg    :  1960  MRN: [id number]  Pt Location: [facility] ROOM 02    SURGERY DATE: 2021    Surgeon(s) and Role:     * Zaire Aguiar DO - Primary    Preop Diagnosis:  Acute intractable headache, unspecified headache type [R51 9]    Post-Op Diagnosis Codes:     * Acute intractable headache, unspecified headache type [R51 9]    Procedure(s) (LRB):  BIOPSY ARTERY TEMPORAL (N/A)    Specimen(s):  ID Type Source Tests Collected by Time Destination   1 : Left Temporal Artery Tissue Artery TISSUE EXAM Zaire Aguiar DO 2021 1336        Estimated Blood Loss:   Minimal    Drains:  None    Anesthesia Type:   IV Sedation with Anesthesia    Operative Indications:  Acute intractable headache, unspecified headache type [R51 9]    Operative Findings:  2 cm portion of left temporal artery excised    Complications:   None    Procedure and Technique:  The patient was seen again in Preoperative Holding  All the risks, benefits, complications, treatment options, and expected outcomes were discussed with the patient and family at length  All questions were answered to satisfaction  There was concurrence with the proposed plan and informed consent was obtained  The site of surgery was properly noted/marked  The patient was taken to Operating Room, identified, and the procedure verified as left temporal artery biopsy, possible right  The patient was placed in the supine position on the operating room table  The patient had received preoperative antibiotics and SCDs placed on the bilateral lower extremities  Anesthesia was then induced and the patient was intubated  The patient was placed in a slight right lateral decubitus position  The left temporal area was then prepped and draped in the usual sterile fashion using ChloraPrep  A Time-Out was then performed with all involved present confirming the correct patient, procedure, antibiotics, and any additional concerns  The left temporal artery was able to be palpated and was marked out with a marker  Lidocaine with epinephrine was used to infiltrate the area for anesthesia  Using a 15  Blade a 3 cm incision is made over the artery  Dissection was carried down to the level of the artery using a Metzenbaum scissors  The left temple artery was identified and verified using a Doppler  The vessel was then skeletonized and was clamped on either and with a hemostat  A 2 cm portion of the left temporal artery was then excised with the Metzenbaum scissors  Using 2-0 silk the ends of the temporal artery were then tied off and was noted  Hemostasis was noted  The incision was irrigated  Deep dermal stitches were placed with interrupted 4-0 Vicryl  4-0 Vicryl was then used in a running fashion to close the skin  The left temporal incision was then cleansed and dried and Histoacryl was used to cover the site  All instrument, sponge, and needle counts were noted to be correct at the conclusion of the case  The patient was brought to the PACU in stable and satisfactory condition       I was present for the entire procedure and A qualified resident physician was not available    Patient Disposition:  extubated and stable    SIGNATURE: Jessica Doan DO  DATE: January 29, 2021  TIME: 1:51 PM

## 2021-01-29 NOTE — DISCHARGE INSTRUCTIONS
Your incisions are closed with surgical glue, do not pick at it as it will fall off on its own  Do not soak your incisions including tub baths or swimming  You may shower and let water and soap wash over incisions  Do not scrub your incisions  You may resume your normal diet as tolerated  Do not make any important decisions and do not drive while taking narcotic pain medication  You are prescribed Tramadol for severe pain  Take only as needed  Take Colace to soften your stool while taking narcotic pain medication  You may take Tylenol over-the-counter as needed for pain, follow instructions on the bottle  Follow-up with your Surgeon in 2 weeks, call the office for an appointment

## 2021-01-29 NOTE — ANESTHESIA POSTPROCEDURE EVALUATION
Post-Op Assessment Note    CV Status:  Stable  Pain Score: 0    Pain management: adequate     Mental Status:  Sleepy   Hydration Status:  Euvolemic   PONV Controlled:  Controlled   Airway Patency:  Patent      Post Op Vitals Reviewed: Yes      Staff: CRNA         No complications documented      /62    Temp (!) 97 4 °F (36 3 °C) (01/29/21 1352)    Pulse 68 (01/29/21 1352)   Resp 18 (01/29/21 1352)    SpO2 99 % (01/29/21 1352)

## 2021-01-30 NOTE — ED PROVIDER NOTES
History  Chief Complaint   Patient presents with    Post-op Problem     Pt reports having surgery this morn and now is having tongue swelling  Pt tok benedryl before she came  Pt denies any trouble breathing and reports that its just the left side of her tongue now  61-year-old female presents for a possible allergic reaction  The patient states that she had a temporal artery biopsy performed this morning and feels that since her discharge she has a sensation of swelling in her tongue  She denies any difficulty breathing, shortness of breath, or gastrointestinal symptoms at this time  States that she took 50 mg of Benadryl about 1 hour ago  Symptoms have been constant, not getting better or not getting worse  She denies any other exacerbating or remitting factors  Symptoms are described as mild and without radiation  History provided by:  Patient   used: No        Prior to Admission Medications   Prescriptions Last Dose Informant Patient Reported? Taking?    Albuterol Sulfate (ProAir RespiClick) 334 (90 Base) MCG/ACT AEPB   No No   Sig: Inhale 2 puffs 4 (four) times a day as needed (wheezing)   Patient not taking: Reported on 1/15/2021   Fesoterodine Fumarate ER (Toviaz) 4 MG TB24  Self No No   Sig: Take 1 tablet (4 mg total) by mouth daily   Probiotic Product (PROBIOTIC-10) CAPS  Self Yes No   Sig: Take by mouth    VITAMIN B COMPLEX-C PO  Self Yes No   Sig: Take 250 mg by mouth daily    acyclovir (ZOVIRAX) 5 % ointment   No No   Sig: Apply topically every 3 (three) hours   aspirin 81 MG tablet  Self Yes No   Sig: Take 1 tablet by mouth daily   butalbital-acetaminophen-caffeine (FIORICET,ESGIC) -40 mg per tablet   No No   Si-2 q 4 h prn headache   cholecalciferol (VITAMIN D3) 1,000 units tablet   Yes No   Sig: Take 1,000 Units by mouth daily   colesevelam (WELCHOL) 625 mg tablet   No No   Sig: Take 3 tablets (1,875 mg total) by mouth 2 (two) times a day with meals Patient taking differently: Take 1,875 mg by mouth daily    docusate sodium (COLACE) 100 mg capsule   No No   Sig: Take 1 capsule (100 mg total) by mouth 3 (three) times a day as needed for constipation (while taking narcotic pain medication)   levocetirizine (XYZAL) 5 MG tablet   No No   Sig: TAKE 1 TABLET BY MOUTH EVERY DAY   levothyroxine 50 mcg tablet  Self No No   Sig: Take 1 tablet (50 mcg total) by mouth daily   orphenadrine (NORFLEX) 100 mg tablet   Yes No   Sig: Take 100 mg by mouth 2 (two) times a day   predniSONE 20 mg tablet   No No   Sig: Take 3 tablets (60 mg total) by mouth daily for 5 days   traMADol (ULTRAM) 50 mg tablet   No No   Sig: Take 1 tablet (50 mg total) by mouth every 8 (eight) hours as needed for moderate pain   traMADol (ULTRAM) 50 mg tablet   No No   Sig: Take 1 tablet (50 mg total) by mouth every 6 (six) hours as needed for moderate pain for up to 10 days   valACYclovir (VALTREX) 500 mg tablet   No No   Sig: Take 1 tablet (500 mg total) by mouth 3 (three) times a day for 7 days   Patient taking differently: Take 500 mg by mouth as needed       Facility-Administered Medications: None       Past Medical History:   Diagnosis Date    Abnormal mammogram     last assessed 6/28/16    Abrasion     last assessed 9/12/16    Acute viral syndrome     last assessed 12/8/17    C  difficile diarrhea     Chronic cystitis     Disease of thyroid gland     Dysuria     last assessed 12/5/16    Endometriosis     Enteritis     last assessed 11/4/14    Fatty liver     Fibromyalgia     Fibromyalgia, primary     Gross hematuria     Hypercholesterolemia     resolved 10/29/14    Photophobia     last assessed 9/29/16    Pneumonia     Pyelonephritis, acute     Snake bite     last assessed 9/14/15       Past Surgical History:   Procedure Laterality Date    CARPAL TUNNEL RELEASE      CARPAL TUNNEL RELEASE      CHOLECYSTECTOMY      COLONOSCOPY  04/29/2010    ENDOMETRIAL ABLATION      FOOT SURGERY Bilateral     HAND SURGERY Right 08/13/2018    cyst removal    HYSTEROSCOPY W/ ENDOMETRIAL ABLATION      IR LUMBAR PUNCTURE  1/25/2021    KNEE RECONSTRUCTION, MEDIAL PATELLAR FEMORAL LIGAMENT Bilateral     screws removed    KNEE SURGERY Left     LAPAROSCOPY      OVARIAN CYST SURGERY      PLANTAR FASCIECTOMY      left calf    WA COLONOSCOPY FLX DX W/COLLJ SPEC WHEN PFRMD N/A 7/11/2018    Procedure: COLONOSCOPY;  Surgeon: Dianne Hayes MD;  Location: [facility]; Service: Gastroenterology    SHOULDER SURGERY Right     and stem cell surgery on shoulder        Family History   Problem Relation Age of Onset    Kidney disease Mother     Heart disease Mother     Heart disease Father     Cancer Paternal Grandfather     Heart disease Other      I have reviewed and agree with the history as documented  E-Cigarette/Vaping    E-Cigarette Use Never User      E-Cigarette/Vaping Substances    Nicotine No     THC No     CBD No     Flavoring No     Other No     Unknown No      Social History     Tobacco Use    Smoking status: Current Some Day Smoker     Packs/day: 0 50     Types: Cigarettes    Smokeless tobacco: Never Used   Substance Use Topics    Alcohol use: No    Drug use: No       Review of Systems   Constitutional: Negative for chills and fever  HENT: Negative for drooling, ear pain, facial swelling, rhinorrhea, sore throat, trouble swallowing and voice change  Complains of sensation of tongue swelling   Eyes: Negative for pain and visual disturbance  Respiratory: Negative for cough and shortness of breath  Cardiovascular: Negative for chest pain and palpitations  Gastrointestinal: Negative for abdominal pain and vomiting  Genitourinary: Negative for dysuria and hematuria  Musculoskeletal: Negative for arthralgias and back pain  Skin: Negative for color change and rash  Neurological: Negative for seizures and syncope     All other systems reviewed and are negative  Physical Exam  Physical Exam  Constitutional:       Appearance: Normal appearance  She is obese  She is not toxic-appearing  HENT:      Head: Normocephalic and atraumatic  Nose: Nose normal       Mouth/Throat:      Mouth: Mucous membranes are moist       Pharynx: Oropharynx is clear  Comments: Perhaps mild tongue swelling but it is difficult this provider to appreciate this objectively as I have not seen the patient's anatomy before  Tonsils are visible on oropharyngeal exam and uvula is midline  Currently Mallampati class 1 to 2  Eyes:      Extraocular Movements: Extraocular movements intact  Conjunctiva/sclera: Conjunctivae normal       Pupils: Pupils are equal, round, and reactive to light  Neck:      Musculoskeletal: Normal range of motion and neck supple  No neck rigidity  Cardiovascular:      Rate and Rhythm: Normal rate and regular rhythm  Pulses: Normal pulses  Pulmonary:      Effort: Pulmonary effort is normal  No respiratory distress  Breath sounds: Normal breath sounds  Abdominal:      General: There is no distension  Palpations: Abdomen is soft  Tenderness: There is no abdominal tenderness  Musculoskeletal: Normal range of motion  General: No swelling, tenderness or signs of injury  Skin:     General: Skin is warm and dry  Capillary Refill: Capillary refill takes less than 2 seconds  Findings: No rash  Neurological:      General: No focal deficit present  Mental Status: She is alert and oriented to person, place, and time  Psychiatric:         Mood and Affect: Mood normal          Thought Content:  Thought content normal          Vital Signs  ED Triage Vitals   Temperature Pulse Respirations Blood Pressure SpO2   01/29/21 2007 01/29/21 2007 01/29/21 2007 01/29/21 2007 01/29/21 2007   97 7 °F (36 5 °C) 94 18 (!) 174/76 100 %      Temp Source Heart Rate Source Patient Position - Orthostatic VS BP Location FiO2 (%) 01/29/21 2007 01/29/21 2007 01/29/21 2007 01/29/21 2007 --   Oral Monitor Sitting Left arm       Pain Score       01/29/21 2157       8           Vitals:    01/29/21 2007 01/29/21 2157   BP: (!) 174/76 135/60   Pulse: 94 78   Patient Position - Orthostatic VS: Sitting Sitting         Visual Acuity      ED Medications  Medications   dexamethasone oral liquid 10 mg 1 mL (10 mg Oral Given 1/29/21 2158)       Diagnostic Studies  Results Reviewed     None                 No orders to display              Procedures  Procedures         ED Course                             SBIRT 20yo+      Most Recent Value   SBIRT (25 yo +)   In order to provide better care to our patients, we are screening all of our patients for alcohol and drug use  Would it be okay to ask you these screening questions? Yes Filed at: 01/29/2021 2158   Initial Alcohol Screen: US AUDIT-C    1  How often do you have a drink containing alcohol?  0 Filed at: 01/29/2021 2158   2  How many drinks containing alcohol do you have on a typical day you are drinking? 0 Filed at: 01/29/2021 2158   3a  Male UNDER 65: How often do you have five or more drinks on one occasion? 0 Filed at: 01/29/2021 2158   3b  FEMALE Any Age, or MALE 65+: How often do you have 4 or more drinks on one occassion? 0 Filed at: 01/29/2021 2158   Audit-C Score  0 Filed at: 01/29/2021 2158   REDDY: How many times in the past year have you    Used an illegal drug or used a prescription medication for non-medical reasons? Never Filed at: 01/29/2021 2158                    MDM  Number of Diagnoses or Management Options  Allergic reaction:   Diagnosis management comments: 53-year-old female presented for possible allergic reaction with sensation of tongue swelling after a procedure performed this morning  Review of the medical record demonstrates that she did have temporal artery biopsy this morning  She had IV sedation but did not undergo general anesthesia    Her physical exam is vital signs today are reassuring and the patient does not report any respiratory symptoms or GI symptoms  Treated with allergy cocktail and observed for several hours in the emergency department  She did not have any worsening of her symptoms  We will plan to discharge on prednisone and recommended she follow-up with PCP  Discussed return precautions  Amount and/or Complexity of Data Reviewed  Review and summarize past medical records: yes        Disposition  Final diagnoses: Allergic reaction     Time reflects when diagnosis was documented in both MDM as applicable and the Disposition within this note     Time User Action Codes Description Comment    1/29/2021 10:58 PM Chidi Ward Add [T78 [id number]] Allergic reaction       ED Disposition     ED Disposition Condition Date/Time Comment    Discharge Stable Fri Jan 29, 2021 10:57 PM Elo Laboy discharge to home/self care  Follow-up Information     Follow up With Specialties Details Why Contact Info    Jeff Butcher MD Family Medicine   [address] [place] [address]  [phone]            Patient's Medications   Discharge Prescriptions    No medications on file     No discharge procedures on file      PDMP Review       Value Time User    PDMP Reviewed  Yes 1/29/2021  1:48 PM Abi Negrete DO          ED Provider  Electronically Signed by           Hitesh Shankar MD  02/11/21 4752

## 2021-02-03 ENCOUNTER — TELEPHONE (OUTPATIENT)
Dept: SURGERY | Facility: CLINIC | Age: 61
End: 2021-02-03

## 2021-02-03 ENCOUNTER — TELEPHONE (OUTPATIENT)
Dept: NEUROLOGY | Facility: CLINIC | Age: 61
End: 2021-02-03

## 2021-02-03 DIAGNOSIS — M31.6 TEMPORAL ARTERITIS (HCC): Primary | ICD-10-CM

## 2021-02-03 RX ORDER — OMEPRAZOLE 20 MG/1
20 CAPSULE, DELAYED RELEASE ORAL DAILY
Qty: 30 CAPSULE | Refills: 5 | Status: SHIPPED | OUTPATIENT
Start: 2021-02-03 | End: 2021-08-03

## 2021-02-03 RX ORDER — PREDNISONE 20 MG/1
TABLET ORAL
Qty: 90 TABLET | Refills: 2 | Status: SHIPPED | OUTPATIENT
Start: 2021-02-03 | End: 2021-08-03

## 2021-02-03 NOTE — TELEPHONE ENCOUNTER
Spoke with patient, temporal artery biopsy was negative for inflammation  She will continue with prednisone for now and will refer her to Rheumatology for 2nd opinion    She will add omeprazole

## 2021-02-03 NOTE — TELEPHONE ENCOUNTER
Patient calling in  She had a temporal artery bypass on 1/29  She had to go to ED that day for an allergic reaction  She was given a script for prednisone  Today is the last day she has it and is questioning if she has to continue with this    Prednisone 20mg, 3 tabs for 5 days was what was given to her on 1/27  She states she does still have a slight pressure headaches, but is improved  Dr Nancy Becerra, please review and advise        [phone]   Ok to leave a detailed message

## 2021-02-05 LAB
B BURGDOR IGG CSF IA-ACNC: <0.08 INDEX (ref 0–0.09)
B BURGDOR IGM CSF IA-ACNC: <0.06 INDEX (ref 0–0.06)

## 2021-02-12 ENCOUNTER — OFFICE VISIT (OUTPATIENT)
Dept: SURGERY | Facility: CLINIC | Age: 61
End: 2021-02-12

## 2021-02-12 VITALS
WEIGHT: 174.6 LBS | SYSTOLIC BLOOD PRESSURE: 132 MMHG | HEIGHT: 61 IN | TEMPERATURE: 97.4 F | HEART RATE: 107 BPM | BODY MASS INDEX: 32.97 KG/M2 | DIASTOLIC BLOOD PRESSURE: 72 MMHG

## 2021-02-12 DIAGNOSIS — G43.511 INTRACTABLE PERSISTENT MIGRAINE AURA WITHOUT CEREBRAL INFARCTION AND WITH STATUS MIGRAINOSUS: Primary | ICD-10-CM

## 2021-02-12 PROCEDURE — 99024 POSTOP FOLLOW-UP VISIT: CPT | Performed by: STUDENT IN AN ORGANIZED HEALTH CARE EDUCATION/TRAINING PROGRAM

## 2021-02-12 PROCEDURE — 3008F BODY MASS INDEX DOCD: CPT | Performed by: PSYCHIATRY & NEUROLOGY

## 2021-02-17 ENCOUNTER — LAB (OUTPATIENT)
Dept: LAB | Facility: HOSPITAL | Age: 61
End: 2021-02-17
Payer: COMMERCIAL

## 2021-02-17 ENCOUNTER — TRANSCRIBE ORDERS (OUTPATIENT)
Dept: ADMINISTRATIVE | Facility: HOSPITAL | Age: 61
End: 2021-02-17

## 2021-02-17 DIAGNOSIS — M25.50 PAIN IN JOINT, MULTIPLE SITES: ICD-10-CM

## 2021-02-17 DIAGNOSIS — R74.8 ACID PHOSPHATASE ELEVATED: ICD-10-CM

## 2021-02-17 DIAGNOSIS — Z20.828 CONTACT WITH AND (SUSPECTED) EXPOSURE TO OTHER VIRAL COMMUNICABLE DISEASES: ICD-10-CM

## 2021-02-17 DIAGNOSIS — Z79.52 LONG TERM CURRENT USE OF SYSTEMIC STEROIDS: ICD-10-CM

## 2021-02-17 DIAGNOSIS — R51.0 ORTHOSTATIC HEADACHE: ICD-10-CM

## 2021-02-17 DIAGNOSIS — R51.0 ORTHOSTATIC HEADACHE: Primary | ICD-10-CM

## 2021-02-17 DIAGNOSIS — R53.83 OTHER FATIGUE: ICD-10-CM

## 2021-02-17 LAB
ALBUMIN SERPL BCP-MCNC: 3.7 G/DL (ref 3.5–5)
ALP SERPL-CCNC: 109 U/L (ref 46–116)
ALT SERPL W P-5'-P-CCNC: 71 U/L (ref 12–78)
ANION GAP SERPL CALCULATED.3IONS-SCNC: 9 MMOL/L (ref 4–13)
AST SERPL W P-5'-P-CCNC: 17 U/L (ref 5–45)
BILIRUB SERPL-MCNC: 0.5 MG/DL (ref 0.2–1)
BUN SERPL-MCNC: 17 MG/DL (ref 5–25)
CALCIUM SERPL-MCNC: 9.5 MG/DL (ref 8.3–10.1)
CHLORIDE SERPL-SCNC: 106 MMOL/L (ref 100–108)
CO2 SERPL-SCNC: 30 MMOL/L (ref 21–32)
CREAT SERPL-MCNC: 1.03 MG/DL (ref 0.6–1.3)
CRP SERPL QL: <3 MG/L
ERYTHROCYTE [DISTWIDTH] IN BLOOD BY AUTOMATED COUNT: 14.7 % (ref 11.6–15.1)
ERYTHROCYTE [SEDIMENTATION RATE] IN BLOOD: 3 MM/HOUR (ref 0–29)
GFR SERPL CREATININE-BSD FRML MDRD: 60 ML/MIN/1.73SQ M
GGT SERPL-CCNC: 220 U/L (ref 5–85)
GLUCOSE P FAST SERPL-MCNC: 87 MG/DL (ref 65–99)
HCT VFR BLD AUTO: 43.7 % (ref 34.8–46.1)
HGB BLD-MCNC: 13.9 G/DL (ref 11.5–15.4)
MCH RBC QN AUTO: 32.1 PG (ref 26.8–34.3)
MCHC RBC AUTO-ENTMCNC: 31.8 G/DL (ref 31.4–37.4)
MCV RBC AUTO: 101 FL (ref 82–98)
PLATELET # BLD AUTO: 266 THOUSANDS/UL (ref 149–390)
PMV BLD AUTO: 9.9 FL (ref 8.9–12.7)
POTASSIUM SERPL-SCNC: 4.1 MMOL/L (ref 3.5–5.3)
PROT SERPL-MCNC: 6.8 G/DL (ref 6.4–8.2)
RBC # BLD AUTO: 4.33 MILLION/UL (ref 3.81–5.12)
SODIUM SERPL-SCNC: 145 MMOL/L (ref 136–145)
T4 FREE SERPL-MCNC: 0.98 NG/DL (ref 0.76–1.46)
TSH SERPL DL<=0.05 MIU/L-ACNC: 1.82 UIU/ML (ref 0.36–3.74)
WBC # BLD AUTO: 18.09 THOUSAND/UL (ref 4.31–10.16)

## 2021-02-17 PROCEDURE — 80053 COMPREHEN METABOLIC PANEL: CPT

## 2021-02-17 PROCEDURE — 86140 C-REACTIVE PROTEIN: CPT

## 2021-02-17 PROCEDURE — 86769 SARS-COV-2 COVID-19 ANTIBODY: CPT

## 2021-02-17 PROCEDURE — 84439 ASSAY OF FREE THYROXINE: CPT

## 2021-02-17 PROCEDURE — 82977 ASSAY OF GGT: CPT

## 2021-02-17 PROCEDURE — 86038 ANTINUCLEAR ANTIBODIES: CPT

## 2021-02-17 PROCEDURE — 85652 RBC SED RATE AUTOMATED: CPT

## 2021-02-17 PROCEDURE — 36415 COLL VENOUS BLD VENIPUNCTURE: CPT

## 2021-02-17 PROCEDURE — 86431 RHEUMATOID FACTOR QUANT: CPT

## 2021-02-17 PROCEDURE — 85027 COMPLETE CBC AUTOMATED: CPT

## 2021-02-17 PROCEDURE — 86430 RHEUMATOID FACTOR TEST QUAL: CPT

## 2021-02-17 PROCEDURE — 84443 ASSAY THYROID STIM HORMONE: CPT

## 2021-02-17 NOTE — PROGRESS NOTES
Virtual Regular Visit    Plan:    Taper prednisone   Initiate Depakote   Follow-up 2 months    Discussion:   Emilie Ramos continues to complain of a headache that is like a halo around the top of her head  She has been on 60 mg of prednisone for 3 weeks and has not noticed any improvement  She was seen by Rheumatology who not feel she had temporal arteritis and recent blood work revealed a normal sedimentation rate with a positive rheumatoid factor  She was advised to consider tapering the prednisone  I agree with tapering the prednisone  Have recommended decreasing by 10 mg every 5 days till she gets to 10 mg and decrease to 5 mg for 5 days then 5 every other day until off  Have recommended a trial of Depakote for her headaches and we did discuss potential adverse effect  I have also suggested melatonin which may help with her sleep issues and may also have a positive impact on her headaches  She reports that the rheumatologist referred her to a headache specialist and she anticipates keeping that appointment  She will follow up with me in 2 months    Problem List Items Addressed This Visit     None               Reason for visit is   Chief Complaint   Patient presents with    Persistent Headaches        Encounter provider Raji Zamorano MD    Provider located at [address] [address]      Recent Visits  No visits were found meeting these conditions  Showing recent visits within past 7 days and meeting all other requirements     Future Appointments  Date Type Provider Dept   02/18/21 Telemedicine Raji Zamorano MD [facility]   Showing future appointments within next 150 days and meeting all other requirements        The patient was identified by name and date of birth   Jen Roman was informed that this is a telemedicine visit and that the visit is being conducted through [facility] conferencing via [employer] in lieu of office visit in the face of ongoing Covid19 viral epidemic  Patient understands that this format is not completely HIPPA compliant, however I am sitting in my office alone with the door closed and patient is in a comfortable environment to speak with me about current health issues  She acknowledged consent and understanding of privacy and security of the video platform  The patient has agreed to participate and understands they can discontinue the visit at any time  Patient is aware this is a billable service  Subjective  Lauro Harvey is a 64 y o  female   Who continues to complain of headaches  She has been on 60 mg of prednisone for about 3 weeks  Aside from weight gain and puffiness she has not noticed any improvement in her symptoms  She continues to have photophobia and sonophobia associated with it  She did see a rheumatologist who did not feel she had temporal arteritis  Additional blood work demonstrated a positive rheumatoid factor  The remainder of the blood work thus far is unremarkable  Her temporal artery biopsy was negative and spinal fluid results were normal   She has yet to start Depakote  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 and the way she feels now 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        HPI     Past Medical History:   Diagnosis Date    Abnormal mammogram     last assessed 6/28/16    Abrasion     last assessed 9/12/16    Acute viral syndrome     last assessed 12/8/17    Bruxism (teeth grinding)     C  difficile diarrhea     Chronic cystitis     Disease of thyroid gland     Dysuria     last assessed 12/5/16    Endometriosis     Enteritis     last assessed 11/4/14    Fatty liver     Fibromyalgia     Fibromyalgia, primary     Gross hematuria     Hypercholesterolemia     resolved 10/29/14    Photophobia     last assessed 9/29/16    Pneumonia     Pyelonephritis, acute     Snake bite     last assessed 9/14/15       Past Surgical History:   Procedure Laterality Date    CARPAL TUNNEL RELEASE      CARPAL TUNNEL RELEASE      CHOLECYSTECTOMY      COLONOSCOPY  04/29/2010    ENDOMETRIAL ABLATION      FOOT SURGERY Bilateral     HAND SURGERY Right 08/13/2018    cyst removal    HYSTEROSCOPY W/ ENDOMETRIAL ABLATION      IR LUMBAR PUNCTURE  1/25/2021    KNEE RECONSTRUCTION, MEDIAL PATELLAR FEMORAL LIGAMENT Bilateral     screws removed    KNEE SURGERY Left     LAPAROSCOPY      OVARIAN CYST SURGERY      PLANTAR FASCIECTOMY      left calf    PA COLONOSCOPY FLX DX W/COLLJ SPEC WHEN PFRMD N/A 7/11/2018    Procedure: COLONOSCOPY;  Surgeon: Ayde Burrell MD;  Location: [facility];   Service: Gastroenterology    PA TEMPORAL ARTERY LIGATN OR BX N/A 1/29/2021    Procedure: BIOPSY ARTERY TEMPORAL;  Surgeon: Pepito Jon DO;  Location: [facility];  Service: General    SHOULDER SURGERY Right     and stem cell surgery on shoulder        Current Outpatient Medications   Medication Sig Dispense Refill    Albuterol Sulfate (ProAir RespiClick) 144 (90 Base) MCG/ACT AEPB Inhale 2 puffs 4 (four) times a day as needed (wheezing) 3 each 1    aspirin 81 MG tablet Take 1 tablet by mouth daily      butalbital-acetaminophen-caffeine (FIORICET,ESGIC) -40 mg per tablet 1-2 q 4 h prn headache 30 tablet 1    Cholecalciferol (Vitamin D3) 50 MCG (2000 UT) capsule Take 2,000 Units by mouth daily       colesevelam (WELCHOL) 625 mg tablet Take 3 tablets (1,875 mg total) by mouth 2 (two) times a day with meals (Patient taking differently: Take 625 mg by mouth daily ) 180 tablet 3    Fesoterodine Fumarate ER (Toviaz) 4 MG TB24 Take 1 tablet (4 mg total) by mouth daily 90 tablet 0    levocetirizine (XYZAL) 5 MG tablet TAKE 1 TABLET BY MOUTH EVERY DAY 90 tablet 5    levothyroxine 50 mcg tablet Take 1 tablet (50 mcg total) by mouth daily 90 tablet 0    omeprazole (PriLOSEC) 20 mg delayed release capsule Take 1 capsule (20 mg total) by mouth daily 30 capsule 5    orphenadrine (NORFLEX) 100 mg tablet Take 100 mg by mouth daily at bedtime       predniSONE 20 mg tablet 3 p o  q day 90 tablet 2    Probiotic Product (PROBIOTIC-10) CAPS Take by mouth       traMADol (ULTRAM) 50 mg tablet Take 1 tablet (50 mg total) by mouth every 8 (eight) hours as needed for moderate pain 90 tablet 0    valACYclovir (VALTREX) 500 mg tablet Take 1 tablet (500 mg total) by mouth 3 (three) times a day for 7 days (Patient taking differently: Take 500 mg by mouth as needed ) 21 tablet 1    VITAMIN B COMPLEX-C PO Take 250 mg by mouth daily       acyclovir (ZOVIRAX) 5 % ointment Apply topically every 3 (three) hours (Patient not taking: Reported on 2/17/2021) 15 g 1    docusate sodium (COLACE) 100 mg capsule Take 1 capsule (100 mg total) by mouth 3 (three) times a day as needed for constipation (while taking narcotic pain medication) 30 each 0     No current facility-administered medications for this visit           Allergies   Allergen Reactions    Aminosyn Anaphylaxis    Contrast Dye  [Iodinated Diagnostic Agents] Anaphylaxis    Iohexol Anaphylaxis    Penicillins Hives    Procalamine Anaphylaxis    Dairy Aid [Lactase]      intolerence    Fentanyl Other (See Comments) and Irritability     Skin crawling      Morphine Other (See Comments) and Irritability     Skin crawling     Nsaids GI Intolerance    Amoxicillin Hives    Avocado      Gi intolerence    Cefuroxime Hives    Ciprocinonide [Fluocinolone] Hives    Ciprofloxacin Hives    Codeine      Skin crawling    Eggs Or Egg-Derived Products GI Bleeding     Stomach pains    Erythromycin Hives    Gluten Meal     Macrobid [Nitrofurantoin] Hives    Other     Sulfa Antibiotics Hives    Sumatriptan Hives and Other (See Comments)     Diff breathing    Wheat Bran     Zithromax [Azithromycin] Hives       Review of Systems   Constitutional: Positive for appetite change (increased appetite) and fatigue  Negative for fever  HENT: Negative  Negative for hearing loss, tinnitus, trouble swallowing and voice change  Eyes: Positive for photophobia, pain (left eye) and visual disturbance  Respiratory: Negative  Negative for shortness of breath  Cardiovascular: Negative  Negative for palpitations  Gastrointestinal: Positive for diarrhea  Negative for nausea and vomiting  Endocrine: Negative  Negative for cold intolerance  Genitourinary: Positive for urgency  Negative for dysuria, enuresis and frequency  Musculoskeletal: Positive for myalgias  Negative for back pain, gait problem and neck pain  Skin: Negative  Negative for rash  Neurological: Positive for dizziness, weakness (upper legs), light-headedness, numbness and headaches  Negative for tremors, seizures, syncope, facial asymmetry and speech difficulty  Tingling sensation within in head on right side  Tightness in jaw   Hematological: Negative  Does not bruise/bleed easily  Psychiatric/Behavioral: Positive for confusion, decreased concentration and sleep disturbance  Negative for hallucinations  Feeling very foggy headed       Video Exam    There were no vitals filed for this visit  Physical Exam   GENERAL:  Well-developed well-nourished Woman in no acute distress  HEENT/NECK: Head is atraumatic normocephalic, neck is supple  NEUROLOGIC:  Mental Status: Awake and alert without aphasia  Cranial Nerves: Extraocular movements are full  Face is symmetrical          I spent 18 minutes directly with the patient during this visit      VIRTUAL VISIT [address] acknowledges that she has consented to an online visit or consultation   She understands that the online visit is based solely on information provided by her, and that, in the absence of a face-to-face physical evaluation by the physician, the diagnosis she receives is both limited and provisional in terms of accuracy and completeness  This is not intended to replace a full medical face-to-face evaluation by the physician  Darcy Jones understands and accepts these terms

## 2021-02-18 ENCOUNTER — TELEMEDICINE (OUTPATIENT)
Dept: NEUROLOGY | Facility: CLINIC | Age: 61
End: 2021-02-18
Payer: COMMERCIAL

## 2021-02-18 DIAGNOSIS — R51.9 ACUTE INTRACTABLE HEADACHE, UNSPECIFIED HEADACHE TYPE: Primary | ICD-10-CM

## 2021-02-18 LAB
CRYOGLOB RF SER-ACNC: ABNORMAL [IU]/ML
RHEUMATOID FACT SER QL LA: POSITIVE
RYE IGE QN: NEGATIVE
SARS-COV-2 IGG+IGM SERPL QL IA: NORMAL

## 2021-02-18 PROCEDURE — 99213 OFFICE O/P EST LOW 20 MIN: CPT | Performed by: PSYCHIATRY & NEUROLOGY

## 2021-02-18 RX ORDER — PREDNISONE 1 MG/1
TABLET ORAL
Qty: 10 TABLET | Refills: 0 | Status: SHIPPED | OUTPATIENT
Start: 2021-02-18 | End: 2021-04-12

## 2021-03-17 ENCOUNTER — APPOINTMENT (OUTPATIENT)
Dept: LAB | Facility: HOSPITAL | Age: 61
End: 2021-03-17
Payer: COMMERCIAL

## 2021-03-17 DIAGNOSIS — M25.50 PAIN IN JOINT, MULTIPLE SITES: ICD-10-CM

## 2021-03-17 DIAGNOSIS — R74.8 ACID PHOSPHATASE ELEVATED: ICD-10-CM

## 2021-03-17 DIAGNOSIS — R53.83 OTHER FATIGUE: ICD-10-CM

## 2021-03-17 DIAGNOSIS — Z20.828 CONTACT WITH AND (SUSPECTED) EXPOSURE TO OTHER VIRAL COMMUNICABLE DISEASES: ICD-10-CM

## 2021-03-17 DIAGNOSIS — R51.0 ORTHOSTATIC HEADACHE: ICD-10-CM

## 2021-03-17 DIAGNOSIS — Z79.52 LONG TERM CURRENT USE OF SYSTEMIC STEROIDS: ICD-10-CM

## 2021-03-17 LAB
CRP SERPL QL: 14.3 MG/L
ERYTHROCYTE [SEDIMENTATION RATE] IN BLOOD: 34 MM/HOUR (ref 0–29)

## 2021-03-17 PROCEDURE — 86140 C-REACTIVE PROTEIN: CPT

## 2021-03-17 PROCEDURE — 85652 RBC SED RATE AUTOMATED: CPT

## 2021-03-17 PROCEDURE — 86235 NUCLEAR ANTIGEN ANTIBODY: CPT

## 2021-03-17 PROCEDURE — 36415 COLL VENOUS BLD VENIPUNCTURE: CPT

## 2021-03-17 PROCEDURE — 86200 CCP ANTIBODY: CPT

## 2021-03-18 LAB
ENA SS-A AB SER-ACNC: <0.2 AI (ref 0–0.9)
ENA SS-B AB SER-ACNC: <0.2 AI (ref 0–0.9)

## 2021-03-19 LAB — CCP AB SER IA-ACNC: 1.1

## 2021-03-22 ENCOUNTER — TRANSCRIBE ORDERS (OUTPATIENT)
Dept: ADMINISTRATIVE | Facility: HOSPITAL | Age: 61
End: 2021-03-22

## 2021-03-22 DIAGNOSIS — G47.30 SLEEP APNEA: Primary | ICD-10-CM

## 2021-03-30 DIAGNOSIS — Z23 ENCOUNTER FOR IMMUNIZATION: ICD-10-CM

## 2021-04-08 DIAGNOSIS — N32.81 OAB (OVERACTIVE BLADDER): ICD-10-CM

## 2021-04-08 RX ORDER — FESOTERODINE FUMARATE 4 MG/1
TABLET, FILM COATED, EXTENDED RELEASE ORAL
Qty: 90 TABLET | Refills: 0 | Status: SHIPPED | OUTPATIENT
Start: 2021-04-08 | End: 2021-07-02

## 2021-04-09 ENCOUNTER — TELEPHONE (OUTPATIENT)
Dept: NEUROLOGY | Facility: CLINIC | Age: 61
End: 2021-04-09

## 2021-04-09 NOTE — TELEPHONE ENCOUNTER
Offered patient sooner appointment with Dr Low Shankar 4/12 at 8:20am  Patient said she is seeing another Neurologist- a headache specialist  Patient had me cancel the appointment for 6/9

## 2021-04-12 ENCOUNTER — TELEPHONE (OUTPATIENT)
Dept: FAMILY MEDICINE CLINIC | Facility: CLINIC | Age: 61
End: 2021-04-12

## 2021-04-12 DIAGNOSIS — N39.0 ACUTE UTI: Primary | ICD-10-CM

## 2021-04-12 RX ORDER — DOXYCYCLINE HYCLATE 100 MG/1
100 CAPSULE ORAL EVERY 12 HOURS SCHEDULED
Qty: 10 CAPSULE | Refills: 0 | Status: SHIPPED | OUTPATIENT
Start: 2021-04-12 | End: 2021-04-17

## 2021-04-12 NOTE — PROGRESS NOTES
Pt called back to find out the status of her rx   Pt advised, rx was sent to [facility]

## 2021-04-12 NOTE — TELEPHONE ENCOUNTER
T/c from pt - she has a uti and would like a medication sent into her pharmacy    (Made note of the fact that she is allergic to a lot of meds)

## 2021-04-19 ENCOUNTER — OFFICE VISIT (OUTPATIENT)
Dept: SLEEP CENTER | Facility: CLINIC | Age: 61
End: 2021-04-19
Payer: COMMERCIAL

## 2021-04-19 VITALS
WEIGHT: 176 LBS | DIASTOLIC BLOOD PRESSURE: 70 MMHG | HEIGHT: 61 IN | SYSTOLIC BLOOD PRESSURE: 130 MMHG | BODY MASS INDEX: 33.23 KG/M2

## 2021-04-19 DIAGNOSIS — E03.9 ACQUIRED HYPOTHYROIDISM: ICD-10-CM

## 2021-04-19 DIAGNOSIS — G47.19 EXCESSIVE DAYTIME SLEEPINESS: ICD-10-CM

## 2021-04-19 DIAGNOSIS — G47.09 OTHER INSOMNIA: ICD-10-CM

## 2021-04-19 DIAGNOSIS — B34.9 VIRAL SYNDROME: ICD-10-CM

## 2021-04-19 DIAGNOSIS — G47.33 OBSTRUCTIVE SLEEP APNEA SYNDROME: Primary | ICD-10-CM

## 2021-04-19 DIAGNOSIS — F45.8 BRUXISM: ICD-10-CM

## 2021-04-19 DIAGNOSIS — G43.511 INTRACTABLE PERSISTENT MIGRAINE AURA WITHOUT CEREBRAL INFARCTION AND WITH STATUS MIGRAINOSUS: ICD-10-CM

## 2021-04-19 DIAGNOSIS — E66.9 OBESITY (BMI 30-39.9): ICD-10-CM

## 2021-04-19 DIAGNOSIS — G25.81 RLS (RESTLESS LEGS SYNDROME): ICD-10-CM

## 2021-04-19 PROCEDURE — 3008F BODY MASS INDEX DOCD: CPT | Performed by: INTERNAL MEDICINE

## 2021-04-19 PROCEDURE — 99204 OFFICE O/P NEW MOD 45 MIN: CPT | Performed by: INTERNAL MEDICINE

## 2021-04-19 RX ORDER — LEVOTHYROXINE SODIUM 0.05 MG/1
TABLET ORAL
Qty: 90 TABLET | Refills: 1 | Status: SHIPPED | OUTPATIENT
Start: 2021-04-19 | End: 2022-01-12

## 2021-04-19 NOTE — PATIENT INSTRUCTIONS
What is EDGARDO? Obstructive sleep apnea is a common and serious sleep disorder that causes you to stop breathing during sleep  The airway repeatedly becomes blocked, limiting the amount of air that reaches your lungs  When this happens, you may snore loudly or making choking noises as you try to breathe  Your brain and body becomes oxygen deprived and you may wake up  This may happen a few times a night, or in more severe cases, several hundred times a night  Sleep apnea can make you wake up in the morning feeling tired or unrefreshed even though you have had a full night of sleep  During the day, you may feel fatigued, have difficulty concentrating or you may even unintentionally fall asleep  This is because your body is waking up numerous times throughout the night, even though you might not be conscious of each awakening  The lack of oxygen your body receives can have negative long-term consequences for your health  This includes:  High blood pressure  Heart disease  Irregular heart rhythms  Stroke  Pre-diabetes and diabetes  Depression    Testing  An objective evaluation of your sleep may be needed before your board certified sleep physician can make a diagnosis  Options include:   In-lab overnight sleep study  This type of sleep study requires you to stay overnight at a sleep center, in a bed that may resemble a hotel room  You will sleep with sensors hooked up to various parts of your body  These sensors record your brain waves, heartbeat, breathing and movement  An overnight sleep study also provides your doctor with the most complete information about your sleep  Learn more about an overnight sleep study      Home sleep apnea test  Some patients with high risk factors for obstructive sleep apnea and no other medical disorders may be candidates for a home sleep apnea test  The testing equipment differs in that it is less complicated than what is used in an overnight sleep study   As such, does not give all the data an in-lab will and if negative, may not mean you do not have the problem  Treatment for sleep apnea  includes using a continuous positive airway pressure (CPAP) machine to keep your airway open during sleep  A mask is placed over your nose and mouth, or just your nose  The mask is hooked to the CPAP machine that blows a gentle stream of air into the mask when you breathe  This helps keep your airway open so you can breathe more regularly  Extra oxygen may be given to you through the machine  You may be given a mouth device  It looks like a mouth guard or dental retainer and stops your tongue and mouth tissues from blocking your throat while you sleep  Surgery may be needed to remove extra tissues that block your mouth, throat, or nose  Manage sleep apnea:   Do not smoke  Nicotine and other chemicals in cigarettes and cigars can cause lung damage  Ask your healthcare provider for information if you currently smoke and need help to quit  E-cigarettes or smokeless tobacco still contain nicotine  Talk to your healthcare provider before you use these products  Do not drink alcohol or take sedative medicine before you go to sleep  Alcohol and sedatives can relax the muscles and tissues around your throat  This can block the airflow to your lungs  Maintain a healthy weight  Excess tissue around your throat may restrict your breathing  Ask your healthcare provider for information if you need to lose weight  Sleep on your side or use pillows designed to prevent sleep apnea  This prevents your tongue or other tissues from blocking your throat  You can also raise the head of your bed  Driving Safety  Refrain from driving when drowsy  Follow up with your healthcare provider as directed:  Write down your questions so you remember to ask them during your visits  Go to AASM website for more information: Sleepeducation  org     What is EDGARDO?    Obstructive sleep apnea is a common and serious sleep disorder that causes you to stop breathing during sleep  The airway repeatedly becomes blocked, limiting the amount of air that reaches your lungs  When this happens, you may snore loudly or making choking noises as you try to breathe  Your brain and body becomes oxygen deprived and you may wake up  This may happen a few times a night, or in more severe cases, several hundred times a night  Sleep apnea can make you wake up in the morning feeling tired or unrefreshed even though you have had a full night of sleep  During the day, you may feel fatigued, have difficulty concentrating or you may even unintentionally fall asleep  This is because your body is waking up numerous times throughout the night, even though you might not be conscious of each awakening  The lack of oxygen your body receives can have negative long-term consequences for your health  This includes:  High blood pressure  Heart disease  Irregular heart rhythms  Stroke  Pre-diabetes and diabetes  Depression    Testing  An objective evaluation of your sleep may be needed before your board certified sleep physician can make a diagnosis  Options include:   In-lab overnight sleep study  This type of sleep study requires you to stay overnight at a sleep center, in a bed that may resemble a hotel room  You will sleep with sensors hooked up to various parts of your body  These sensors record your brain waves, heartbeat, breathing and movement  An overnight sleep study also provides your doctor with the most complete information about your sleep  Learn more about an overnight sleep study      Home sleep apnea test  Some patients with high risk factors for obstructive sleep apnea and no other medical disorders may be candidates for a home sleep apnea test  The testing equipment differs in that it is less complicated than what is used in an overnight sleep study   As such, does not give all the data an in-lab will and if negative, may not mean you do not have the problem  Treatment for sleep apnea  includes using a continuous positive airway pressure (CPAP) machine to keep your airway open during sleep  A mask is placed over your nose and mouth, or just your nose  The mask is hooked to the CPAP machine that blows a gentle stream of air into the mask when you breathe  This helps keep your airway open so you can breathe more regularly  Extra oxygen may be given to you through the machine  You may be given a mouth device  It looks like a mouth guard or dental retainer and stops your tongue and mouth tissues from blocking your throat while you sleep  Surgery may be needed to remove extra tissues that block your mouth, throat, or nose  Manage sleep apnea:   Do not smoke  Nicotine and other chemicals in cigarettes and cigars can cause lung damage  Ask your healthcare provider for information if you currently smoke and need help to quit  E-cigarettes or smokeless tobacco still contain nicotine  Talk to your healthcare provider before you use these products  Do not drink alcohol or take sedative medicine before you go to sleep  Alcohol and sedatives can relax the muscles and tissues around your throat  This can block the airflow to your lungs  Maintain a healthy weight  Excess tissue around your throat may restrict your breathing  Ask your healthcare provider for information if you need to lose weight  Sleep on your side or use pillows designed to prevent sleep apnea  This prevents your tongue or other tissues from blocking your throat  You can also raise the head of your bed  Driving Safety  Refrain from driving when drowsy  Follow up with your healthcare provider as directed:  Write down your questions so you remember to ask them during your visits  Go to AASM website for more information: Sleepeducation  org     What you can do to improve your sleep: (Sleep Hygiene) Basic rules for a good night's sleep    Create a regular sleep schedule    This will help you form a sleep routine  Keep a record of your sleep patterns, and any sleeping problems you have  Bring the record to follow-up visits with healthcare providers  Avoid prolonged use of light-emitting screens before bedtime or watching TV in bed  Avoid forcing sleep  Do not take naps  Naps could make it hard for you to fall asleep at bedtime  Deal with your worries before bedtime  Keep your bedroom cool, quiet, and dark  Turn on white noise, such as a fan, to help you relax  Do not use your bed for any activity that will keep you awake  Do not read, exercise, eat, or watch TV in your bedroom  Get up if you do not fall asleep within 20 minutes  Move to another room and do something relaxing until you become sleepy  Limit caffeine, alcohol, nicotine and food to earlier in the day  Only drink caffeine in the morning  Do not drink alcohol within 6 hours of bedtime  Do not eat a heavy meal right before you go to bed  Avoid smoking, especially in the evening  Exercise regularly  Daily exercise will help you sleep better  Do not exercise within 4 hours of bedtime  Stimulus control therapy rules  1  Go to bed only when sleepy  2  Do not watch television, read, eat, or worry while in bed  Use bed only for sleep and sex  3  Get out of bed if unable to fall asleep within 20 minutes and go to another room  Return to bed only when sleepy  Repeat this step as many times as necessary throughout the night  4  Set an alarm clock to wake up at a fixed time each morning, including weekends  5  Do not take a nap during the day  Data from: [address] [place] [address], [address] Nonpharmacologic treatments of insomnia  J Clin Psychiatry 9787; 53:37  Go to AASM website for more information: Sleepeducation  org     Recommended Reading:  Book by authors [address]   No More sleepless nights          Nursing Support:  When: Monday through Friday 7A-5PM except holidays  Where: Our direct line is [phone]  If you are having a true emergency please call 911  In the event that the line is busy or it is after hours please leave a voice message and we will return your call  Please speak clearly, leaving your full name, birth date, best number to reach you and the reason for your call  Medication refills: We will need the name of the medication, the dosage, the ordering provider, whether you get a 30 or 90 day refill, and the pharmacy name and address  Medications will be ordered by the provider only  Nurses cannot call in prescriptions  Please allow 7 days for medication refills  Physician requested updates: If your provider requested that you call with an update after starting medication, please be ready to provide us the medication and dosage, what time you take your medication, the time you attempt to fall asleep, time you fall asleep, when you wake up, and what time you get out of bed  Sleep Study Results: We will contact you with sleep study results and/or next steps after the physician has reviewed your testing

## 2021-04-19 NOTE — PROGRESS NOTES
Review of Systems      Genitourinary post menopausal (no peroids)   Cardiology ankle/leg swelling   Gastrointestinal frequent heartburn/acid reflux   Neurology frequent headaches, awaken with headache, muscle weakness and numbness/tingling of an extremity   Constitutional fatigue and weight change   Integumentary rash or dry skin and itching   Psychiatry none   Musculoskeletal joint pain, muscle aches, back pain, sciatica and leg cramps   Pulmonary none   ENT ringing in ears   Endocrine excessive thirst and frequent urination   Hematological none
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
## 2021-05-02 ENCOUNTER — HOSPITAL ENCOUNTER (OUTPATIENT)
Dept: SLEEP CENTER | Facility: CLINIC | Age: 61
Discharge: HOME | End: 2021-05-02
Payer: COMMERCIAL

## 2021-05-02 DIAGNOSIS — G25.81 RLS (RESTLESS LEGS SYNDROME): ICD-10-CM

## 2021-05-02 DIAGNOSIS — G47.09 OTHER INSOMNIA: ICD-10-CM

## 2021-05-02 DIAGNOSIS — G47.33 OBSTRUCTIVE SLEEP APNEA SYNDROME: ICD-10-CM

## 2021-05-02 DIAGNOSIS — G47.19 EXCESSIVE DAYTIME SLEEPINESS: ICD-10-CM

## 2021-05-02 PROCEDURE — 95810 POLYSOM 6/> YRS 4/> PARAM: CPT

## 2021-05-03 DIAGNOSIS — G47.33 OBSTRUCTIVE SLEEP APNEA SYNDROME: Primary | ICD-10-CM

## 2021-05-03 NOTE — PROGRESS NOTES
Sleep Study Documentation    Pre-Sleep Study       Sleep testing procedure explained to patient:YES    Patient napped prior to study:NO    Caffeine:Dayshift worker after 12PM   Caffeine use:NO    Alcohol:Dayshift workers after 5PM: Alcohol use:NO    Typical day for patient:YES       Study Documentation    Sleep Study Indications: Snoring, BMI>30, EDS, Chronic AM Headaches, RLS  Sleep Study: Diagnostic   Snore:Severe  Supplemental O2: no    O2 flow rate (L/min) range   O2 flow rate (L/min) final   Minimum SaO2 78%  Baseline SaO2 97%        Mode of Therapy:    EKG abnormalities: no     EEG abnormalities: no    Sleep Study Recorded < 2 hours: N/A    Sleep Study Recorded > 2 hours but incomplete study: N/A    Sleep Study Recorded 6 hours but no sleep obtained: NO    Patient classification: employed       Post-Sleep Study    Medication used at bedtime or during sleep study:YES other prescription medications    Patient reports time it took to fall asleep:20 to 30 minutes    Patient reports waking up during study:1 to 2 times  Patient reports returning to sleep without difficulty  Patient reports sleeping 4 to 6 hours without dreaming  Patient reports sleep during study:worse than usual    Patient rated sleepiness: Somewhat sleepy or tired    PAP treatment:no

## 2021-05-07 ENCOUNTER — TELEPHONE (OUTPATIENT)
Dept: SLEEP CENTER | Facility: CLINIC | Age: 61
End: 2021-05-07

## 2021-05-07 DIAGNOSIS — Z01.812 ENCOUNTER FOR PREPROCEDURE SCREENING LABORATORY TESTING FOR COVID-19: Primary | ICD-10-CM

## 2021-05-07 DIAGNOSIS — Z20.822 ENCOUNTER FOR PREPROCEDURE SCREENING LABORATORY TESTING FOR COVID-19: Primary | ICD-10-CM

## 2021-05-07 NOTE — TELEPHONE ENCOUNTER
Left message for patient to call office for sleep study results  Mild to moderate EDGARDO, hypoxia and severe PLM  Needs to schedule CPAP study  Patient has follow up with Dr Angel Singer on 6/28/21

## 2021-05-10 NOTE — TELEPHONE ENCOUNTER
Returned patient's message and reviewed sleep study results  Advised CPAP study ordered  Scheduled CPAP study for 7/20/21 in [facility]  Instructions provided  Verbalized understanding  Discussed COVID protocol:  Patient agreeable to have COVID test on 7/12/21, for PAP study on 7/20/21  We ask that you limit interpersonal interactions as much as possible and observe all social distancing and masking precautions from the time of your testing to the time of your study  COVID order placed  COVID letter sent  Rescheduled follow up with Dr Alex Rocha to 7/28/21

## 2021-05-11 ENCOUNTER — TELEPHONE (OUTPATIENT)
Dept: SLEEP CENTER | Facility: CLINIC | Age: 61
End: 2021-05-11

## 2021-05-14 ENCOUNTER — APPOINTMENT (OUTPATIENT)
Dept: LAB | Facility: HOSPITAL | Age: 61
End: 2021-05-14
Payer: COMMERCIAL

## 2021-05-14 ENCOUNTER — TRANSCRIBE ORDERS (OUTPATIENT)
Dept: ADMINISTRATIVE | Facility: HOSPITAL | Age: 61
End: 2021-05-14

## 2021-05-14 DIAGNOSIS — Z79.899 ENCOUNTER FOR LONG-TERM (CURRENT) USE OF OTHER MEDICATIONS: ICD-10-CM

## 2021-05-14 DIAGNOSIS — R70.0 ELEVATED SEDIMENTATION RATE: ICD-10-CM

## 2021-05-14 DIAGNOSIS — R70.0 ELEVATED SEDIMENTATION RATE: Primary | ICD-10-CM

## 2021-05-14 LAB
ALBUMIN SERPL BCP-MCNC: 3.9 G/DL (ref 3.5–5)
ALP SERPL-CCNC: 141 U/L (ref 46–116)
ALT SERPL W P-5'-P-CCNC: 32 U/L (ref 12–78)
ANION GAP SERPL CALCULATED.3IONS-SCNC: 11 MMOL/L (ref 4–13)
AST SERPL W P-5'-P-CCNC: 19 U/L (ref 5–45)
BILIRUB SERPL-MCNC: 0.44 MG/DL (ref 0.2–1)
BUN SERPL-MCNC: 8 MG/DL (ref 5–25)
CALCIUM SERPL-MCNC: 9.5 MG/DL (ref 8.3–10.1)
CHLORIDE SERPL-SCNC: 107 MMOL/L (ref 100–108)
CO2 SERPL-SCNC: 25 MMOL/L (ref 21–32)
CREAT SERPL-MCNC: 0.9 MG/DL (ref 0.6–1.3)
CRP SERPL QL: 9.2 MG/L
ERYTHROCYTE [SEDIMENTATION RATE] IN BLOOD: 23 MM/HOUR (ref 0–29)
GFR SERPL CREATININE-BSD FRML MDRD: 71 ML/MIN/1.73SQ M
GLUCOSE P FAST SERPL-MCNC: 104 MG/DL (ref 65–99)
POTASSIUM SERPL-SCNC: 4.4 MMOL/L (ref 3.5–5.3)
PROT SERPL-MCNC: 7.5 G/DL (ref 6.4–8.2)
SODIUM SERPL-SCNC: 143 MMOL/L (ref 136–145)

## 2021-05-14 PROCEDURE — 80053 COMPREHEN METABOLIC PANEL: CPT

## 2021-05-14 PROCEDURE — 86140 C-REACTIVE PROTEIN: CPT

## 2021-05-14 PROCEDURE — 85652 RBC SED RATE AUTOMATED: CPT

## 2021-05-14 PROCEDURE — 36415 COLL VENOUS BLD VENIPUNCTURE: CPT

## 2021-07-02 DIAGNOSIS — N32.81 OAB (OVERACTIVE BLADDER): ICD-10-CM

## 2021-07-02 RX ORDER — FESOTERODINE FUMARATE 4 MG/1
TABLET, FILM COATED, EXTENDED RELEASE ORAL
Qty: 90 TABLET | Refills: 0 | Status: SHIPPED | OUTPATIENT
Start: 2021-07-02 | End: 2021-11-19

## 2021-07-08 ENCOUNTER — TELEPHONE (OUTPATIENT)
Dept: SLEEP CENTER | Facility: CLINIC | Age: 61
End: 2021-07-08

## 2021-07-09 NOTE — TELEPHONE ENCOUNTER
Herrera Villegas MD  P Sleep Medicine [facility] patient we unable to do titration studies at this time   I am initiating auto titrating Pap pending the study        Left message for patient to call office

## 2021-07-12 ENCOUNTER — TELEPHONE (OUTPATIENT)
Dept: GASTROENTEROLOGY | Facility: CLINIC | Age: 61
End: 2021-07-12

## 2021-07-12 DIAGNOSIS — K58.0 IRRITABLE BOWEL SYNDROME WITH DIARRHEA: Primary | ICD-10-CM

## 2021-07-12 NOTE — TELEPHONE ENCOUNTER
Leisa Linda pt-  Patient is requesting an order for a dietician     Please phone [phone] if, any quesitons

## 2021-07-13 ENCOUNTER — HOSPITAL ENCOUNTER (OUTPATIENT)
Dept: ULTRASOUND IMAGING | Facility: CLINIC | Age: 61
Discharge: HOME | End: 2021-07-13
Payer: COMMERCIAL

## 2021-07-13 DIAGNOSIS — N30.21 OTHER CHRONIC CYSTITIS WITH HEMATURIA: ICD-10-CM

## 2021-07-13 PROCEDURE — 76770 US EXAM ABDO BACK WALL COMP: CPT

## 2021-07-16 NOTE — TELEPHONE ENCOUNTER
Called patient and advised of order for APAP  Patient would like set up in the [facility] location  Order for APAP and clinicals emailed to [facility]  Advised patient that someone from [facility] should be reaching out to her to schedule set up  Scheduled compliance follow up 10/13/21

## 2021-07-20 ENCOUNTER — APPOINTMENT (OUTPATIENT)
Dept: LAB | Facility: HOSPITAL | Age: 61
End: 2021-07-20
Payer: COMMERCIAL

## 2021-07-20 DIAGNOSIS — R79.82 ELEVATED C-REACTIVE PROTEIN (CRP): ICD-10-CM

## 2021-07-20 DIAGNOSIS — Z79.899 ENCOUNTER FOR LONG-TERM (CURRENT) USE OF OTHER MEDICATIONS: ICD-10-CM

## 2021-07-20 LAB
ALBUMIN SERPL BCP-MCNC: 4.1 G/DL (ref 3.5–5)
ALP SERPL-CCNC: 153 U/L (ref 46–116)
ALT SERPL W P-5'-P-CCNC: 35 U/L (ref 12–78)
ANION GAP SERPL CALCULATED.3IONS-SCNC: 12 MMOL/L (ref 4–13)
AST SERPL W P-5'-P-CCNC: 16 U/L (ref 5–45)
BASOPHILS # BLD AUTO: 0.06 THOUSANDS/ΜL (ref 0–0.1)
BASOPHILS NFR BLD AUTO: 1 % (ref 0–1)
BILIRUB SERPL-MCNC: 0.41 MG/DL (ref 0.2–1)
BUN SERPL-MCNC: 12 MG/DL (ref 5–25)
CALCIUM SERPL-MCNC: 9.5 MG/DL (ref 8.3–10.1)
CHLORIDE SERPL-SCNC: 105 MMOL/L (ref 100–108)
CO2 SERPL-SCNC: 27 MMOL/L (ref 21–32)
CREAT SERPL-MCNC: 1.01 MG/DL (ref 0.6–1.3)
CRP SERPL QL: 19 MG/L
EOSINOPHIL # BLD AUTO: 0.16 THOUSAND/ΜL (ref 0–0.61)
EOSINOPHIL NFR BLD AUTO: 2 % (ref 0–6)
ERYTHROCYTE [DISTWIDTH] IN BLOOD BY AUTOMATED COUNT: 13.1 % (ref 11.6–15.1)
ERYTHROCYTE [SEDIMENTATION RATE] IN BLOOD: 25 MM/HOUR (ref 0–29)
GFR SERPL CREATININE-BSD FRML MDRD: 62 ML/MIN/1.73SQ M
GLUCOSE P FAST SERPL-MCNC: 100 MG/DL (ref 65–99)
HCT VFR BLD AUTO: 46.3 % (ref 34.8–46.1)
HGB BLD-MCNC: 14.7 G/DL (ref 11.5–15.4)
IMM GRANULOCYTES # BLD AUTO: 0.05 THOUSAND/UL (ref 0–0.2)
IMM GRANULOCYTES NFR BLD AUTO: 1 % (ref 0–2)
LYMPHOCYTES # BLD AUTO: 3.94 THOUSANDS/ΜL (ref 0.6–4.47)
LYMPHOCYTES NFR BLD AUTO: 38 % (ref 14–44)
MCH RBC QN AUTO: 30.9 PG (ref 26.8–34.3)
MCHC RBC AUTO-ENTMCNC: 31.7 G/DL (ref 31.4–37.4)
MCV RBC AUTO: 97 FL (ref 82–98)
MONOCYTES # BLD AUTO: 0.54 THOUSAND/ΜL (ref 0.17–1.22)
MONOCYTES NFR BLD AUTO: 5 % (ref 4–12)
NEUTROPHILS # BLD AUTO: 5.7 THOUSANDS/ΜL (ref 1.85–7.62)
NEUTS SEG NFR BLD AUTO: 53 % (ref 43–75)
NRBC BLD AUTO-RTO: 0 /100 WBCS
PLATELET # BLD AUTO: 280 THOUSANDS/UL (ref 149–390)
PMV BLD AUTO: 10.3 FL (ref 8.9–12.7)
POTASSIUM SERPL-SCNC: 4.3 MMOL/L (ref 3.5–5.3)
PROT SERPL-MCNC: 7.7 G/DL (ref 6.4–8.2)
RBC # BLD AUTO: 4.76 MILLION/UL (ref 3.81–5.12)
SODIUM SERPL-SCNC: 144 MMOL/L (ref 136–145)
WBC # BLD AUTO: 10.45 THOUSAND/UL (ref 4.31–10.16)

## 2021-07-20 PROCEDURE — 80053 COMPREHEN METABOLIC PANEL: CPT

## 2021-07-20 PROCEDURE — 85025 COMPLETE CBC W/AUTO DIFF WBC: CPT

## 2021-07-20 PROCEDURE — 86140 C-REACTIVE PROTEIN: CPT

## 2021-07-20 PROCEDURE — 85652 RBC SED RATE AUTOMATED: CPT

## 2021-07-20 PROCEDURE — 36415 COLL VENOUS BLD VENIPUNCTURE: CPT

## 2021-07-26 ENCOUNTER — VBI (OUTPATIENT)
Dept: ADMINISTRATIVE | Facility: OTHER | Age: 61
End: 2021-07-26

## 2021-08-03 ENCOUNTER — APPOINTMENT (OUTPATIENT)
Dept: LAB | Facility: HOSPITAL | Age: 61
End: 2021-08-03
Attending: FAMILY MEDICINE
Payer: COMMERCIAL

## 2021-08-03 ENCOUNTER — OFFICE VISIT (OUTPATIENT)
Dept: FAMILY MEDICINE CLINIC | Facility: CLINIC | Age: 61
End: 2021-08-03
Payer: COMMERCIAL

## 2021-08-03 VITALS
HEIGHT: 61 IN | SYSTOLIC BLOOD PRESSURE: 122 MMHG | DIASTOLIC BLOOD PRESSURE: 76 MMHG | OXYGEN SATURATION: 98 % | BODY MASS INDEX: 32.06 KG/M2 | TEMPERATURE: 98.5 F | HEART RATE: 94 BPM | WEIGHT: 169.8 LBS

## 2021-08-03 DIAGNOSIS — Z82.49 FAMILY HISTORY OF CORONARY ARTERY DISEASE IN FATHER: ICD-10-CM

## 2021-08-03 DIAGNOSIS — E03.9 HYPOTHYROIDISM, UNSPECIFIED TYPE: Primary | ICD-10-CM

## 2021-08-03 DIAGNOSIS — J45.20 MILD INTERMITTENT ASTHMA WITHOUT STATUS ASTHMATICUS WITHOUT COMPLICATION: ICD-10-CM

## 2021-08-03 DIAGNOSIS — R09.89 BILATERAL CAROTID BRUITS: ICD-10-CM

## 2021-08-03 DIAGNOSIS — E03.9 HYPOTHYROIDISM, UNSPECIFIED TYPE: ICD-10-CM

## 2021-08-03 DIAGNOSIS — E78.2 MIXED HYPERLIPIDEMIA: ICD-10-CM

## 2021-08-03 DIAGNOSIS — G25.81 RLS (RESTLESS LEGS SYNDROME): ICD-10-CM

## 2021-08-03 DIAGNOSIS — K58.0 IRRITABLE BOWEL SYNDROME WITH DIARRHEA: ICD-10-CM

## 2021-08-03 DIAGNOSIS — R07.9 CHEST PAIN, UNSPECIFIED TYPE: ICD-10-CM

## 2021-08-03 DIAGNOSIS — R79.82 ELEVATED C-REACTIVE PROTEIN (CRP): ICD-10-CM

## 2021-08-03 DIAGNOSIS — Z00.00 HEALTH MAINTENANCE EXAMINATION: ICD-10-CM

## 2021-08-03 DIAGNOSIS — G47.33 OBSTRUCTIVE SLEEP APNEA SYNDROME: ICD-10-CM

## 2021-08-03 DIAGNOSIS — G43.511 INTRACTABLE PERSISTENT MIGRAINE AURA WITHOUT CEREBRAL INFARCTION AND WITH STATUS MIGRAINOSUS: ICD-10-CM

## 2021-08-03 DIAGNOSIS — M54.50 CHRONIC BILATERAL LOW BACK PAIN WITHOUT SCIATICA: ICD-10-CM

## 2021-08-03 DIAGNOSIS — Z12.31 SCREENING MAMMOGRAM, ENCOUNTER FOR: ICD-10-CM

## 2021-08-03 DIAGNOSIS — G89.29 CHRONIC BILATERAL LOW BACK PAIN WITHOUT SCIATICA: ICD-10-CM

## 2021-08-03 PROBLEM — R10.9 ABDOMINAL PAIN: Status: RESOLVED | Noted: 2018-06-20 | Resolved: 2021-08-03

## 2021-08-03 PROBLEM — R11.0 NAUSEA: Status: RESOLVED | Noted: 2018-06-20 | Resolved: 2021-08-03

## 2021-08-03 PROBLEM — R93.7 ABNORMAL MRI, LUMBAR SPINE: Status: RESOLVED | Noted: 2018-10-09 | Resolved: 2021-08-03

## 2021-08-03 PROBLEM — R19.7 DIARRHEA: Status: RESOLVED | Noted: 2018-04-03 | Resolved: 2021-08-03

## 2021-08-03 PROBLEM — R14.0 BLOATING: Status: RESOLVED | Noted: 2018-06-20 | Resolved: 2021-08-03

## 2021-08-03 PROBLEM — B34.9 VIRAL SYNDROME: Status: RESOLVED | Noted: 2018-10-09 | Resolved: 2021-08-03

## 2021-08-03 PROBLEM — M79.641 RIGHT HAND PAIN: Status: RESOLVED | Noted: 2018-02-08 | Resolved: 2021-08-03

## 2021-08-03 LAB
ALBUMIN SERPL BCP-MCNC: 4.1 G/DL (ref 3.5–5)
ALP SERPL-CCNC: 149 U/L (ref 46–116)
ALT SERPL W P-5'-P-CCNC: 36 U/L (ref 12–78)
ANION GAP SERPL CALCULATED.3IONS-SCNC: 11 MMOL/L (ref 4–13)
AST SERPL W P-5'-P-CCNC: 14 U/L (ref 5–45)
BILIRUB SERPL-MCNC: 0.37 MG/DL (ref 0.2–1)
BUN SERPL-MCNC: 8 MG/DL (ref 5–25)
CALCIUM SERPL-MCNC: 9.5 MG/DL (ref 8.3–10.1)
CHLORIDE SERPL-SCNC: 106 MMOL/L (ref 100–108)
CHOLEST SERPL-MCNC: 271 MG/DL (ref 50–200)
CO2 SERPL-SCNC: 28 MMOL/L (ref 21–32)
CREAT SERPL-MCNC: 0.9 MG/DL (ref 0.6–1.3)
GFR SERPL CREATININE-BSD FRML MDRD: 71 ML/MIN/1.73SQ M
GLUCOSE P FAST SERPL-MCNC: 94 MG/DL (ref 65–99)
HDLC SERPL-MCNC: 29 MG/DL
LDLC SERPL CALC-MCNC: 196 MG/DL (ref 0–100)
NONHDLC SERPL-MCNC: 242 MG/DL
POTASSIUM SERPL-SCNC: 4.4 MMOL/L (ref 3.5–5.3)
PROT SERPL-MCNC: 7.6 G/DL (ref 6.4–8.2)
SODIUM SERPL-SCNC: 145 MMOL/L (ref 136–145)
TRIGL SERPL-MCNC: 232 MG/DL
TSH SERPL DL<=0.05 MIU/L-ACNC: 0.83 UIU/ML (ref 0.36–3.74)

## 2021-08-03 PROCEDURE — 99396 PREV VISIT EST AGE 40-64: CPT | Performed by: FAMILY MEDICINE

## 2021-08-03 PROCEDURE — 80053 COMPREHEN METABOLIC PANEL: CPT

## 2021-08-03 PROCEDURE — 80061 LIPID PANEL: CPT

## 2021-08-03 PROCEDURE — 84443 ASSAY THYROID STIM HORMONE: CPT

## 2021-08-03 PROCEDURE — 3725F SCREEN DEPRESSION PERFORMED: CPT | Performed by: FAMILY MEDICINE

## 2021-08-03 PROCEDURE — 36415 COLL VENOUS BLD VENIPUNCTURE: CPT

## 2021-08-03 RX ORDER — GABAPENTIN 100 MG/1
CAPSULE ORAL
Qty: 90 CAPSULE | Refills: 3 | Status: SHIPPED | OUTPATIENT
Start: 2021-08-03 | End: 2022-01-12

## 2021-08-03 RX ORDER — MULTIVIT WITH MINERALS/LUTEIN
500 TABLET ORAL DAILY
COMMUNITY

## 2021-08-03 RX ORDER — TRAMADOL HYDROCHLORIDE 50 MG/1
50 TABLET ORAL EVERY 8 HOURS PRN
Qty: 90 TABLET | Refills: 0 | Status: CANCELLED | OUTPATIENT
Start: 2021-08-03

## 2021-08-03 RX ORDER — TRAMADOL HYDROCHLORIDE 50 MG/1
50 TABLET ORAL EVERY 8 HOURS PRN
Qty: 90 TABLET | Refills: 0 | Status: SHIPPED | OUTPATIENT
Start: 2021-08-03 | End: 2022-06-05
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
## 2021-08-04 ENCOUNTER — HOSPITAL ENCOUNTER (EMERGENCY)
Facility: HOSPITAL | Age: 61
Discharge: HOME | End: 2021-08-04
Attending: EMERGENCY MEDICINE | Admitting: EMERGENCY MEDICINE
Payer: COMMERCIAL

## 2021-08-04 ENCOUNTER — APPOINTMENT (EMERGENCY)
Dept: RADIOLOGY | Facility: HOSPITAL | Age: 61
End: 2021-08-04
Payer: COMMERCIAL

## 2021-08-04 ENCOUNTER — TELEPHONE (OUTPATIENT)
Dept: FAMILY MEDICINE CLINIC | Facility: CLINIC | Age: 61
End: 2021-08-04

## 2021-08-04 VITALS
DIASTOLIC BLOOD PRESSURE: 86 MMHG | SYSTOLIC BLOOD PRESSURE: 165 MMHG | OXYGEN SATURATION: 97 % | TEMPERATURE: 98.1 F | RESPIRATION RATE: 18 BRPM | HEART RATE: 114 BPM
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
## 2021-08-04 DIAGNOSIS — S01.85XA DOG BITE OF CHIN, INITIAL ENCOUNTER: ICD-10-CM
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
## 2021-08-04 DIAGNOSIS — S61.451A DOG BITE OF RIGHT HAND, INITIAL ENCOUNTER: ICD-10-CM
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
## 2021-08-04 DIAGNOSIS — S63.502A SPRAIN OF LEFT WRIST, INITIAL ENCOUNTER: Primary | ICD-10-CM

## 2021-08-04 DIAGNOSIS — S61.552A DOG BITE OF LEFT WRIST, INITIAL ENCOUNTER: ICD-10-CM

## 2021-08-04 PROCEDURE — 90715 TDAP VACCINE 7 YRS/> IM: CPT | Performed by: PHYSICIAN ASSISTANT

## 2021-08-04 PROCEDURE — 73110 X-RAY EXAM OF WRIST: CPT

## 2021-08-04 PROCEDURE — 99283 EMERGENCY DEPT VISIT LOW MDM: CPT

## 2021-08-04 PROCEDURE — 99283 EMERGENCY DEPT VISIT LOW MDM: CPT | Performed by: PHYSICIAN ASSISTANT

## 2021-08-04 PROCEDURE — 90471 IMMUNIZATION ADMIN: CPT

## 2021-08-04 PROCEDURE — 29125 APPL SHORT ARM SPLINT STATIC: CPT | Performed by: PHYSICIAN ASSISTANT

## 2021-08-04 RX ORDER — DOXYCYCLINE HYCLATE 100 MG/1
100 CAPSULE ORAL ONCE
Status: COMPLETED | OUTPATIENT
Start: 2021-08-04 | End: 2021-08-04

## 2021-08-04 RX ORDER — DOXYCYCLINE HYCLATE 100 MG/1
100 CAPSULE ORAL 2 TIMES DAILY
Qty: 20 CAPSULE | Refills: 0 | Status: SHIPPED | OUTPATIENT
Start: 2021-08-04 | End: 2021-08-14

## 2021-08-04 RX ADMIN — TETANUS TOXOID, REDUCED DIPHTHERIA TOXOID AND ACELLULAR PERTUSSIS VACCINE, ADSORBED 0.5 ML: 5; 2.5; 8; 8; 2.5 SUSPENSION INTRAMUSCULAR at 20:12

## 2021-08-04 RX ADMIN — DOXYCYCLINE 100 MG: 100 CAPSULE ORAL at 20:12

## 2021-08-04 NOTE — TELEPHONE ENCOUNTER
----- Message from Negrita Swenson MD sent at 8/3/2021  6:43 PM EDT -----   Call patient, high cholesterol should start medication for this  All other labs are okay

## 2021-08-04 NOTE — ED NOTES
Patient transported to [facility] [place] [facility], RN  08/04/21 9298

## 2021-08-05 ENCOUNTER — OFFICE VISIT (OUTPATIENT)
Dept: FAMILY MEDICINE CLINIC | Facility: CLINIC | Age: 61
End: 2021-08-05
Payer: COMMERCIAL

## 2021-08-05 ENCOUNTER — HOSPITAL ENCOUNTER (OUTPATIENT)
Dept: RADIOLOGY | Facility: HOSPITAL | Age: 61
Discharge: HOME | End: 2021-08-05
Attending: FAMILY MEDICINE
Payer: COMMERCIAL

## 2021-08-05 VITALS
HEIGHT: 61 IN | OXYGEN SATURATION: 99 % | SYSTOLIC BLOOD PRESSURE: 130 MMHG | HEART RATE: 104 BPM | BODY MASS INDEX: 32.02 KG/M2 | WEIGHT: 169.6 LBS | DIASTOLIC BLOOD PRESSURE: 72 MMHG

## 2021-08-05 DIAGNOSIS — E78.2 MIXED HYPERLIPIDEMIA: ICD-10-CM

## 2021-08-05 DIAGNOSIS — S61.451D DOG BITE OF RIGHT HAND, SUBSEQUENT ENCOUNTER: ICD-10-CM
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
## 2021-08-05 DIAGNOSIS — S00.83XA CONTUSION OF FACE, INITIAL ENCOUNTER: Primary | ICD-10-CM

## 2021-08-05 DIAGNOSIS — S00.83XA CONTUSION OF FACE, INITIAL ENCOUNTER: ICD-10-CM

## 2021-08-05 DIAGNOSIS — S61.552A DOG BITE OF LEFT WRIST, INITIAL ENCOUNTER: ICD-10-CM

## 2021-08-05 DIAGNOSIS — E03.9 HYPOTHYROIDISM, UNSPECIFIED TYPE: ICD-10-CM
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
## 2021-08-05 DIAGNOSIS — S01.85XD: ICD-10-CM
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
## 2021-08-05 PROCEDURE — 4004F PT TOBACCO SCREEN RCVD TLK: CPT | Performed by: FAMILY MEDICINE

## 2021-08-05 PROCEDURE — 99214 OFFICE O/P EST MOD 30 MIN: CPT | Performed by: FAMILY MEDICINE

## 2021-08-05 PROCEDURE — 70110 X-RAY EXAM OF JAW 4/> VIEWS: CPT

## 2021-08-05 RX ORDER — ROSUVASTATIN CALCIUM 10 MG/1
10 TABLET, COATED ORAL DAILY
Qty: 30 TABLET | Refills: 5 | Status: SHIPPED | OUTPATIENT
Start: 2021-08-05 | End: 2021-08-05 | Stop reason: SDUPTHER

## 2021-08-05 RX ORDER — ROSUVASTATIN CALCIUM 10 MG/1
10 TABLET, COATED ORAL DAILY
Qty: 30 TABLET | Refills: 5 | Status: SHIPPED | OUTPATIENT
Start: 2021-08-05 | End: 2022-05-08
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
## 2021-08-06 ENCOUNTER — TELEPHONE (OUTPATIENT)
Dept: FAMILY MEDICINE CLINIC | Facility: CLINIC | Age: 61
End: 2021-08-06

## 2021-08-10 ENCOUNTER — OFFICE VISIT (OUTPATIENT)
Dept: GASTROENTEROLOGY | Facility: CLINIC | Age: 61
End: 2021-08-10
Payer: COMMERCIAL

## 2021-08-10 VITALS
WEIGHT: 167 LBS | HEART RATE: 92 BPM | DIASTOLIC BLOOD PRESSURE: 82 MMHG | SYSTOLIC BLOOD PRESSURE: 120 MMHG | HEIGHT: 61 IN | BODY MASS INDEX: 31.53 KG/M2

## 2021-08-10 DIAGNOSIS — K58.0 IRRITABLE BOWEL SYNDROME WITH DIARRHEA: Primary | ICD-10-CM

## 2021-08-10 PROCEDURE — 99214 OFFICE O/P EST MOD 30 MIN: CPT | Performed by: INTERNAL MEDICINE

## 2021-08-10 PROCEDURE — 3008F BODY MASS INDEX DOCD: CPT | Performed by: FAMILY MEDICINE

## 2021-08-10 RX ORDER — CHOLESTYRAMINE 4 G/9G
1 POWDER, FOR SUSPENSION ORAL
Qty: 90 PACKET | Refills: 3 | Status: SHIPPED | OUTPATIENT
Start: 2021-08-10
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
## 2021-08-26 ENCOUNTER — RA CDI HCC (OUTPATIENT)
Dept: OTHER | Facility: HOSPITAL | Age: 61
End: 2021-08-26

## 2021-08-26 NOTE — PROGRESS NOTES
[facility]  coding opportunities       Chart reviewed, no opportunity found: CHART REVIEWED, NO OPPORTUNITY FOUND                        Patients insurance company:  [employer] [facility] (Medicare Advantage and Commercial)

## 2021-08-31 ENCOUNTER — CLINICAL SUPPORT (OUTPATIENT)
Dept: NUTRITION | Facility: HOSPITAL | Age: 61
End: 2021-08-31
Attending: INTERNAL MEDICINE
Payer: COMMERCIAL

## 2021-08-31 ENCOUNTER — TELEPHONE (OUTPATIENT)
Dept: FAMILY MEDICINE CLINIC | Facility: CLINIC | Age: 61
End: 2021-08-31

## 2021-08-31 DIAGNOSIS — K58.0 IRRITABLE BOWEL SYNDROME WITH DIARRHEA: ICD-10-CM

## 2021-08-31 PROCEDURE — 97802 MEDICAL NUTRITION INDIV IN: CPT
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
## 2021-08-31 NOTE — TELEPHONE ENCOUNTER
Pt called to cx appt for 09/02/021 with Dr Trujillo  Pt will call to schedule new f/u after scheduling VAS carotid complete study  and US

## 2021-09-23 ENCOUNTER — TELEMEDICINE (OUTPATIENT)
Dept: FAMILY MEDICINE CLINIC | Facility: CLINIC | Age: 61
End: 2021-09-23
Payer: COMMERCIAL

## 2021-09-23 VITALS — HEIGHT: 61 IN | WEIGHT: 167 LBS | BODY MASS INDEX: 31.53 KG/M2

## 2021-09-23 DIAGNOSIS — R05.9 COUGH: Primary | ICD-10-CM

## 2021-09-23 DIAGNOSIS — G25.81 RESTLESS LEG SYNDROME: ICD-10-CM

## 2021-09-23 DIAGNOSIS — G43.511 INTRACTABLE PERSISTENT MIGRAINE AURA WITHOUT CEREBRAL INFARCTION AND WITH STATUS MIGRAINOSUS: ICD-10-CM

## 2021-09-23 DIAGNOSIS — R07.81 PLEURITIC CHEST PAIN: ICD-10-CM

## 2021-09-23 PROBLEM — S00.83XA CONTUSION OF FACE: Status: RESOLVED | Noted: 2021-08-05 | Resolved: 2021-09-23

## 2021-09-23 PROBLEM — S01.85XA DOG BITE OF CHIN: Status: RESOLVED | Noted: 2021-08-04 | Resolved: 2021-09-23

## 2021-09-23 PROBLEM — S61.451A DOG BITE OF RIGHT HAND: Status: RESOLVED | Noted: 2021-08-04 | Resolved: 2021-09-23

## 2021-09-23 PROBLEM — S61.552A DOG BITE OF LEFT WRIST: Status: RESOLVED | Noted: 2021-08-04 | Resolved: 2021-09-23
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
## 2021-09-23 PROCEDURE — U0003 INFECTIOUS AGENT DETECTION BY NUCLEIC ACID (DNA OR RNA); SEVERE ACUTE RESPIRATORY SYNDROME CORONAVIRUS 2 (SARS-COV-2) (CORONAVIRUS DISEASE [COVID-19]), AMPLIFIED PROBE TECHNIQUE, MAKING USE OF HIGH THROUGHPUT TECHNOLOGIES AS DESCRIBED BY CMS-2020-01-R: HCPCS | Performed by: FAMILY MEDICINE

## 2021-09-23 PROCEDURE — U0005 INFEC AGEN DETEC AMPLI PROBE: HCPCS | Performed by: FAMILY MEDICINE

## 2021-09-23 PROCEDURE — 99214 OFFICE O/P EST MOD 30 MIN: CPT | Performed by: FAMILY MEDICINE

## 2021-09-23 PROCEDURE — 4004F PT TOBACCO SCREEN RCVD TLK: CPT | Performed by: FAMILY MEDICINE

## 2021-09-23 RX ORDER — PROMETHAZINE HYDROCHLORIDE AND CODEINE PHOSPHATE 6.25; 1 MG/5ML; MG/5ML
5 SYRUP ORAL EVERY 4 HOURS PRN
Qty: 240 ML | Refills: 0 | Status: SHIPPED | OUTPATIENT
Start: 2021-09-23

## 2021-09-23 RX ORDER — METHYLPREDNISOLONE 4 MG/1
TABLET ORAL
Qty: 21 EACH | Refills: 0 | Status: SHIPPED | OUTPATIENT
Start: 2021-09-23
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
## 2021-09-24 LAB — SARS-COV-2 RNA RESP QL NAA+PROBE: NEGATIVE

## 2021-10-06 ENCOUNTER — HOSPITAL ENCOUNTER (OUTPATIENT)
Dept: MAMMOGRAPHY | Facility: CLINIC | Age: 61
Discharge: HOME | End: 2021-10-06
Payer: COMMERCIAL

## 2021-10-06 VITALS — WEIGHT: 167 LBS | BODY MASS INDEX: 31.53 KG/M2 | HEIGHT: 61 IN

## 2021-10-06 DIAGNOSIS — Z12.31 SCREENING MAMMOGRAM, ENCOUNTER FOR: ICD-10-CM

## 2021-10-06 PROCEDURE — 77067 SCR MAMMO BI INCL CAD: CPT

## 2021-10-06 PROCEDURE — 77063 BREAST TOMOSYNTHESIS BI: CPT

## 2021-10-12 ENCOUNTER — HOSPITAL ENCOUNTER (OUTPATIENT)
Dept: NON INVASIVE DIAGNOSTICS | Facility: HOSPITAL | Age: 61
Discharge: HOME | End: 2021-10-12
Attending: FAMILY MEDICINE
Payer: COMMERCIAL

## 2021-10-12 DIAGNOSIS — R07.9 CHEST PAIN, UNSPECIFIED TYPE: ICD-10-CM

## 2021-10-12 LAB
ARRHY DURING EX: NORMAL
CHEST PAIN STATEMENT: NORMAL
MAX DIASTOLIC BP: 80 MMHG
MAX HEART RATE: 139 BPM
MAX HR PERCENT: 86 %
MAX PREDICTED HEART RATE: 161 BPM
MAX. SYSTOLIC BP: 220 MMHG
PROTOCOL NAME: NORMAL
REASON FOR TERMINATION: NORMAL
SL CV STRESS RECOVERY BP: NORMAL MMHG
SL CV STRESS RECOVERY HR: 115 BPM
STRESS BASELINE BP: NORMAL MMHG
STRESS BASELINE HR: 86 BPM
STRESS O2 SAT REST: 97 %
STRESS PEAK HR: 137 BPM
STRESS PERCENT HR: 86 %
STRESS POST ESTIMATED WORKLOAD: 7.3 METS
STRESS POST EXERCISE DUR MIN: 6 MIN
STRESS POST EXERCISE DUR SEC: 6 SEC
STRESS POST O2 SAT PEAK: 99 %
STRESS POST PEAK BP: NORMAL MMHG
STRESS TARGET HR: 138.46 BPM
TARGET HR FORMULA: NORMAL
TIME IN EXERCISE PHASE: NORMAL

## 2021-10-12 PROCEDURE — 93017 CV STRESS TEST TRACING ONLY: CPT

## 2021-10-12 PROCEDURE — 93018 CV STRESS TEST I&R ONLY: CPT | Performed by: INTERNAL MEDICINE

## 2021-10-12 PROCEDURE — 93016 CV STRESS TEST SUPVJ ONLY: CPT | Performed by: INTERNAL MEDICINE

## 2021-10-19 ENCOUNTER — APPOINTMENT (OUTPATIENT)
Dept: LAB | Facility: HOSPITAL | Age: 61
End: 2021-10-19
Payer: COMMERCIAL

## 2021-10-19 DIAGNOSIS — R79.82 ELEVATED C-REACTIVE PROTEIN (CRP): ICD-10-CM

## 2021-10-19 DIAGNOSIS — G44.011 INTRACTABLE EPISODIC CLUSTER HEADACHE: ICD-10-CM

## 2021-10-19 LAB
25(OH)D3 SERPL-MCNC: 56.7 NG/ML (ref 30–100)
CRP SERPL QL: 6.7 MG/L
ERYTHROCYTE [SEDIMENTATION RATE] IN BLOOD: 38 MM/HOUR (ref 0–29)
FERRITIN SERPL-MCNC: 121 NG/ML (ref 8–388)
IRON SATN MFR SERPL: 21 % (ref 15–50)
IRON SERPL-MCNC: 62 UG/DL (ref 50–170)
TIBC SERPL-MCNC: 289 UG/DL (ref 250–450)

## 2021-10-19 PROCEDURE — 83550 IRON BINDING TEST: CPT

## 2021-10-19 PROCEDURE — 36415 COLL VENOUS BLD VENIPUNCTURE: CPT

## 2021-10-19 PROCEDURE — 82306 VITAMIN D 25 HYDROXY: CPT

## 2021-10-19 PROCEDURE — 82728 ASSAY OF FERRITIN: CPT

## 2021-10-19 PROCEDURE — 86430 RHEUMATOID FACTOR TEST QUAL: CPT

## 2021-10-19 PROCEDURE — 86200 CCP ANTIBODY: CPT

## 2021-10-19 PROCEDURE — 86431 RHEUMATOID FACTOR QUANT: CPT

## 2021-10-19 PROCEDURE — 86140 C-REACTIVE PROTEIN: CPT

## 2021-10-19 PROCEDURE — 83540 ASSAY OF IRON: CPT

## 2021-10-19 PROCEDURE — 85652 RBC SED RATE AUTOMATED: CPT

## 2021-10-20 LAB
CRYOGLOB RF SER-ACNC: ABNORMAL [IU]/ML
RHEUMATOID FACT SER QL LA: POSITIVE

## 2021-10-22 LAB — CCP AB SER IA-ACNC: 1.7

## 2021-10-28 ENCOUNTER — HOSPITAL ENCOUNTER (OUTPATIENT)
Dept: VASCULAR ULTRASOUND | Facility: HOSPITAL | Age: 61
Discharge: HOME | End: 2021-10-28
Attending: FAMILY MEDICINE
Payer: COMMERCIAL

## 2021-10-28 DIAGNOSIS — R09.89 BILATERAL CAROTID BRUITS: ICD-10-CM

## 2021-10-28 PROCEDURE — 93880 EXTRACRANIAL BILAT STUDY: CPT | Performed by: SURGERY

## 2021-10-28 PROCEDURE — 93880 EXTRACRANIAL BILAT STUDY: CPT

## 2021-11-19 DIAGNOSIS — N32.81 OAB (OVERACTIVE BLADDER): ICD-10-CM

## 2021-11-19 RX ORDER — FESOTERODINE FUMARATE 4 MG/1
TABLET, FILM COATED, EXTENDED RELEASE ORAL
Qty: 90 TABLET | Refills: 0 | Status: SHIPPED | OUTPATIENT
Start: 2021-11-19 | End: 2021-12-17

## 2021-11-22 ENCOUNTER — VBI (OUTPATIENT)
Dept: ADMINISTRATIVE | Facility: OTHER | Age: 61
End: 2021-11-22

## 2021-12-01 DIAGNOSIS — J45.20 MILD INTERMITTENT ASTHMA WITHOUT COMPLICATION: ICD-10-CM

## 2021-12-01 RX ORDER — ALBUTEROL SULFATE 90 UG/1
2 POWDER, METERED RESPIRATORY (INHALATION) 4 TIMES DAILY PRN
Qty: 3 EACH | Refills: 1 | Status: SHIPPED | OUTPATIENT
Start: 2021-12-01

## 2021-12-27 ENCOUNTER — TELEPHONE (OUTPATIENT)
Dept: FAMILY MEDICINE CLINIC | Facility: CLINIC | Age: 61
End: 2021-12-27

## 2022-01-12 DIAGNOSIS — E03.9 ACQUIRED HYPOTHYROIDISM: ICD-10-CM

## 2022-01-12 DIAGNOSIS — G25.81 RLS (RESTLESS LEGS SYNDROME): ICD-10-CM

## 2022-01-12 DIAGNOSIS — J30.1 CHRONIC SEASONAL ALLERGIC RHINITIS DUE TO POLLEN: ICD-10-CM

## 2022-01-12 RX ORDER — LEVOTHYROXINE SODIUM 0.05 MG/1
TABLET ORAL
Qty: 90 TABLET | Refills: 1 | Status: SHIPPED | OUTPATIENT
Start: 2022-01-12 | End: 2022-07-01

## 2022-01-12 RX ORDER — GABAPENTIN 100 MG/1
CAPSULE ORAL
Qty: 90 CAPSULE | Refills: 2 | Status: SHIPPED | OUTPATIENT
Start: 2022-01-12 | End: 2022-04-21

## 2022-01-12 RX ORDER — LEVOCETIRIZINE DIHYDROCHLORIDE 5 MG/1
TABLET, FILM COATED ORAL
Qty: 90 TABLET | Refills: 2 | Status: SHIPPED | OUTPATIENT
Start: 2022-01-12

## 2022-03-02 ENCOUNTER — HOSPITAL ENCOUNTER (EMERGENCY)
Facility: HOSPITAL | Age: 62
Discharge: HOME | End: 2022-03-02
Attending: EMERGENCY MEDICINE
Payer: COMMERCIAL

## 2022-03-02 ENCOUNTER — APPOINTMENT (EMERGENCY)
Dept: CT IMAGING | Facility: HOSPITAL | Age: 62
End: 2022-03-02
Payer: COMMERCIAL

## 2022-03-02 VITALS
DIASTOLIC BLOOD PRESSURE: 67 MMHG | SYSTOLIC BLOOD PRESSURE: 145 MMHG | OXYGEN SATURATION: 98 % | BODY MASS INDEX: 31.18 KG/M2 | HEART RATE: 91 BPM | WEIGHT: 165 LBS | RESPIRATION RATE: 18 BRPM

## 2022-03-02 DIAGNOSIS — R51.9 HEADACHE: Primary | ICD-10-CM

## 2022-03-02 LAB
ALBUMIN SERPL BCP-MCNC: 3.8 G/DL (ref 3.5–5)
ALP SERPL-CCNC: 149 U/L (ref 46–116)
ALT SERPL W P-5'-P-CCNC: 28 U/L (ref 12–78)
ANION GAP SERPL CALCULATED.3IONS-SCNC: 8 MMOL/L (ref 4–13)
AST SERPL W P-5'-P-CCNC: 14 U/L (ref 5–45)
BASOPHILS # BLD MANUAL: 0.11 THOUSAND/UL (ref 0–0.1)
BASOPHILS NFR MAR MANUAL: 1 % (ref 0–1)
BILIRUB SERPL-MCNC: 0.21 MG/DL (ref 0.2–1)
BUN SERPL-MCNC: 13 MG/DL (ref 5–25)
CALCIUM SERPL-MCNC: 9.3 MG/DL (ref 8.3–10.1)
CHLORIDE SERPL-SCNC: 102 MMOL/L (ref 100–108)
CO2 SERPL-SCNC: 27 MMOL/L (ref 21–32)
CREAT SERPL-MCNC: 0.89 MG/DL (ref 0.6–1.3)
CRP SERPL QL: 3.4 MG/L
EOSINOPHIL # BLD MANUAL: 0.32 THOUSAND/UL (ref 0–0.4)
EOSINOPHIL NFR BLD MANUAL: 3 % (ref 0–6)
ERYTHROCYTE [DISTWIDTH] IN BLOOD BY AUTOMATED COUNT: 13.2 % (ref 11.6–15.1)
ERYTHROCYTE [SEDIMENTATION RATE] IN BLOOD: 10 MM/HOUR (ref 0–29)
GFR SERPL CREATININE-BSD FRML MDRD: 71 ML/MIN/1.73SQ M
GLUCOSE SERPL-MCNC: 135 MG/DL (ref 65–140)
HCT VFR BLD AUTO: 39.9 % (ref 34.8–46.1)
HGB BLD-MCNC: 13.3 G/DL (ref 11.5–15.4)
LYMPHOCYTES # BLD AUTO: 47 % (ref 14–44)
LYMPHOCYTES # BLD AUTO: 5.08 THOUSAND/UL (ref 0.6–4.47)
MCH RBC QN AUTO: 31.7 PG (ref 26.8–34.3)
MCHC RBC AUTO-ENTMCNC: 33.3 G/DL (ref 31.4–37.4)
MCV RBC AUTO: 95 FL (ref 82–98)
MONOCYTES # BLD AUTO: 0.76 THOUSAND/UL (ref 0–1.22)
MONOCYTES NFR BLD: 7 % (ref 4–12)
NEUTROPHILS # BLD MANUAL: 4.54 THOUSAND/UL (ref 1.85–7.62)
NEUTS SEG NFR BLD AUTO: 42 % (ref 43–75)
PLATELET # BLD AUTO: 234 THOUSANDS/UL (ref 149–390)
PLATELET BLD QL SMEAR: ADEQUATE
PMV BLD AUTO: 10.4 FL (ref 8.9–12.7)
POTASSIUM SERPL-SCNC: 3.5 MMOL/L (ref 3.5–5.3)
PROT SERPL-MCNC: 6.8 G/DL (ref 6.4–8.2)
RBC # BLD AUTO: 4.2 MILLION/UL (ref 3.81–5.12)
SODIUM SERPL-SCNC: 137 MMOL/L (ref 136–145)
WBC # BLD AUTO: 10.8 THOUSAND/UL (ref 4.31–10.16)

## 2022-03-02 PROCEDURE — 80053 COMPREHEN METABOLIC PANEL: CPT | Performed by: EMERGENCY MEDICINE

## 2022-03-02 PROCEDURE — 36415 COLL VENOUS BLD VENIPUNCTURE: CPT | Performed by: EMERGENCY MEDICINE

## 2022-03-02 PROCEDURE — 70450 CT HEAD/BRAIN W/O DYE: CPT

## 2022-03-02 PROCEDURE — 99284 EMERGENCY DEPT VISIT MOD MDM: CPT | Performed by: EMERGENCY MEDICINE

## 2022-03-02 PROCEDURE — 85652 RBC SED RATE AUTOMATED: CPT | Performed by: EMERGENCY MEDICINE

## 2022-03-02 PROCEDURE — 85027 COMPLETE CBC AUTOMATED: CPT | Performed by: EMERGENCY MEDICINE

## 2022-03-02 PROCEDURE — 86140 C-REACTIVE PROTEIN: CPT | Performed by: EMERGENCY MEDICINE

## 2022-03-02 PROCEDURE — 85007 BL SMEAR W/DIFF WBC COUNT: CPT | Performed by: EMERGENCY MEDICINE

## 2022-03-02 PROCEDURE — 99284 EMERGENCY DEPT VISIT MOD MDM: CPT

## 2022-03-02 RX ORDER — KETOROLAC TROMETHAMINE 30 MG/ML
15 INJECTION, SOLUTION INTRAMUSCULAR; INTRAVENOUS ONCE
Status: COMPLETED | OUTPATIENT
Start: 2022-03-02 | End: 2022-03-02

## 2022-03-02 RX ORDER — MAGNESIUM SULFATE HEPTAHYDRATE 40 MG/ML
2 INJECTION, SOLUTION INTRAVENOUS ONCE
Status: COMPLETED | OUTPATIENT
Start: 2022-03-02 | End: 2022-03-02

## 2022-03-02 RX ORDER — DIPHENHYDRAMINE HYDROCHLORIDE 50 MG/ML
25 INJECTION INTRAMUSCULAR; INTRAVENOUS ONCE
Status: COMPLETED | OUTPATIENT
Start: 2022-03-02 | End: 2022-03-02

## 2022-03-02 RX ORDER — METOCLOPRAMIDE HYDROCHLORIDE 5 MG/ML
10 INJECTION INTRAMUSCULAR; INTRAVENOUS ONCE
Status: COMPLETED | OUTPATIENT
Start: 2022-03-02 | End: 2022-03-02

## 2022-03-02 RX ADMIN — DIPHENHYDRAMINE HYDROCHLORIDE 25 MG: 50 INJECTION, SOLUTION INTRAMUSCULAR; INTRAVENOUS at 21:36

## 2022-03-02 RX ADMIN — METOCLOPRAMIDE HYDROCHLORIDE 10 MG: 5 INJECTION INTRAMUSCULAR; INTRAVENOUS at 21:33

## 2022-03-02 RX ADMIN — KETOROLAC TROMETHAMINE 15 MG: 30 INJECTION, SOLUTION INTRAMUSCULAR at 21:32

## 2022-03-02 RX ADMIN — MAGNESIUM SULFATE HEPTAHYDRATE 2 G: 40 INJECTION, SOLUTION INTRAVENOUS at 21:37

## 2022-03-02 RX ADMIN — SODIUM CHLORIDE 1000 ML: 0.9 INJECTION, SOLUTION INTRAVENOUS at 21:32

## 2022-03-02 NOTE — Clinical Note
Higinio Lozano was seen and treated in our emergency department on 3/2/2022  Diagnosis:     Anil Buenrostro  may return to work on return date  She may return on this date: 03/04/2022         If you have any questions or concerns, please don't hesitate to call        Nikita Antonio MD    ______________________________           _______________          _______________  Hospital Representative                              Date                                Time

## 2022-03-03 NOTE — ED NOTES
Patient present with headache since 12/20, also  HTN and blurry vision     Edwige Steen, [address] [facility]  03/02/22 2126
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
## 2022-03-04 ENCOUNTER — VBI (OUTPATIENT)
Dept: FAMILY MEDICINE CLINIC | Facility: CLINIC | Age: 62
End: 2022-03-04

## 2022-03-04 NOTE — TELEPHONE ENCOUNTER
Jessica Johnsonalin    ED Visit Information     Ed visit date: 3/2/2022  Diagnosis Description: Headache  [facility]  Discharge status: Home  Discharged with meds ? No  Number of ED visits to date: 1  ED Severity:3     Outreach Information    Outreach successful: No 2  Date letter mailed: via my chart  Date Finalized:3/8/22    Care Coordination    Follow up appointment with pcp: no 0  Transportation issues ?  NA    Value Base Outreach    Outreach type: 3 Day Outreach  Emergent necessity warranted by diagnosis: Yes  [facility] PCP: Yes  Called PCP first?: No  Practice Contacted Patient ?: No  Pt had ED follow up with pcp/staff ?: No    Reason Patient went to ED instead of Urgent Care or PCP?: Specialist instructions  03/04/2022 02:35 PM Phone (VBI) Samantha Stapleton (Self) [phone] (H) Remove  Left Message - [facility] [employer],    By Vannessa Man    03/07/2022 08:53 AM Phone (VBI) Samantha Stapleton (Self) [phone] (H) Remove  Left Message - Outreach performed ED f/u on behalf of the PCP's office  [facility]        3/8/22-   My chart CN  letter sent     By Vannessa Man

## 2022-03-04 NOTE — LETTER
Nate Padilla [address] [address] [address]    Date: 03/08/22  [place] [address]    Dear Ashu Baird: Thank you for choosing [facility] emergency department for care on 3/2/22  As your primary care provider we want to make sure that your ongoing medical care is being addressed  If you require follow up care as a result of your emergency department visit, there are a few things we would like you to know  As part of our continuing commitment to caring for our patient, we have added more same day appointments and have extended our office hours to meet your medical needs  After hours, on-call physicians are available via our main office line  We encourage you to contact our office prior to seeking treatment to discuss your symptoms with our medical staff  Together, we can determine the correct course of action  A majority of non-emergent conditions such as: common cold, flu-like symptoms, fevers, strains/sprains, dislocations, minor burns, cuts and animal bites can be treated at [address] [place] [address] facilities  Diagnostic testing is available at some sites  Of course, if you are experiencing a life threatening medical emergency call 911 or proceed directly to the nearest emergency room      Your nearest [address] [place] [address] facility is conveniently located at:    [address] [place] [address] [address]  Nate Bills [address]  [phone]  SKIP THE WAIT  Conveniently offered at most [facility] locations  [place] your spot online at [email or web address] or on the [place] [phone]    Sincerely,    Nate Padilla [id number]  Dept: [phone]

## 2022-03-21 ENCOUNTER — APPOINTMENT (OUTPATIENT)
Dept: LAB | Facility: HOSPITAL | Age: 62
End: 2022-03-21
Payer: COMMERCIAL

## 2022-03-21 DIAGNOSIS — H04.123 DRY EYE SYNDROME OF BOTH LACRIMAL GLANDS: ICD-10-CM

## 2022-03-21 DIAGNOSIS — R79.82 ELEVATED C-REACTIVE PROTEIN (CRP): ICD-10-CM

## 2022-03-21 LAB
CRP SERPL QL: 4.7 MG/L
ERYTHROCYTE [SEDIMENTATION RATE] IN BLOOD: 19 MM/HOUR (ref 0–29)

## 2022-03-21 PROCEDURE — 36415 COLL VENOUS BLD VENIPUNCTURE: CPT

## 2022-03-21 PROCEDURE — 86235 NUCLEAR ANTIGEN ANTIBODY: CPT

## 2022-03-21 PROCEDURE — 85652 RBC SED RATE AUTOMATED: CPT

## 2022-03-21 PROCEDURE — 86140 C-REACTIVE PROTEIN: CPT

## 2022-03-22 LAB
ENA SS-A AB SER-ACNC: <0.2 AI (ref 0–0.9)
ENA SS-B AB SER-ACNC: <0.2 AI (ref 0–0.9)

## 2022-03-26 ENCOUNTER — APPOINTMENT (EMERGENCY)
Dept: RADIOLOGY | Facility: HOSPITAL | Age: 62
End: 2022-03-26
Payer: COMMERCIAL

## 2022-03-26 ENCOUNTER — HOSPITAL ENCOUNTER (EMERGENCY)
Facility: HOSPITAL | Age: 62
Discharge: HOME | End: 2022-03-26
Attending: EMERGENCY MEDICINE | Admitting: EMERGENCY MEDICINE
Payer: COMMERCIAL

## 2022-03-26 VITALS
HEART RATE: 97 BPM | DIASTOLIC BLOOD PRESSURE: 75 MMHG | OXYGEN SATURATION: 100 % | TEMPERATURE: 97.9 F | SYSTOLIC BLOOD PRESSURE: 142 MMHG | RESPIRATION RATE: 20 BRPM

## 2022-03-26 DIAGNOSIS — S93.401A RIGHT ANKLE SPRAIN: Primary | ICD-10-CM

## 2022-03-26 PROCEDURE — 99283 EMERGENCY DEPT VISIT LOW MDM: CPT

## 2022-03-26 PROCEDURE — 73610 X-RAY EXAM OF ANKLE: CPT

## 2022-03-26 PROCEDURE — 73630 X-RAY EXAM OF FOOT: CPT

## 2022-03-26 PROCEDURE — 99284 EMERGENCY DEPT VISIT MOD MDM: CPT | Performed by: PHYSICIAN ASSISTANT
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
## 2022-03-28 ENCOUNTER — VBI (OUTPATIENT)
Dept: FAMILY MEDICINE CLINIC | Facility: CLINIC | Age: 62
End: 2022-03-28

## 2022-03-28 NOTE — LETTER
Nate Padilla [address]    Date: 03/30/22  [place] [address]    Dear Jovany Ellison: Thank you for choosing [facility] emergency department for care  As your primary care provider we want to make sure that your ongoing medical care is being addressed  If you require follow up care as a result of your emergency department visit, there are a few things we would like you to know  As part of our continuing commitment to caring for our patient, we have added more same day appointments and have extended our office hours to meet your medical needs  After hours, on-call physicians are available via our main office line  We encourage you to contact our office prior to seeking treatment to discuss your symptoms with our medical staff  Together, we can determine the correct course of action  A majority of non-emergent conditions such as: common cold, flu-like symptoms, fevers, strains/sprains, dislocations, minor burns, cuts and animal bites can be treated at [address] [place] [address] facilities  Diagnostic testing is available at some sites  Of course, if you are experiencing a life threatening medical emergency call 911 or proceed directly to the nearest emergency room      Your nearest [address] [place] [address] facility is conveniently located at:    [address] [place] [address] [address]  Nate Bills [address]90  [phone]  SKIP THE WAIT  Conveniently offered at most [facility] locations  [place] your spot online at www [facility] [email or web address] or on the [place] [address]    Sincerely,    Nate Padilla [address]  Dept: [phone]

## 2022-03-28 NOTE — TELEPHONE ENCOUNTER
Kiara Tafoya    ED Visit Information     Ed visit date: 3/26/22  Diagnosis Description:   Right ankle sprain   In Network? Yes [facility]  Discharge status: Home  Discharged with meds ? Yes  Number of ED visits to date: 2  ED Severity:4     Outreach Information    Outreach successful: No 2  Date letter mailed:my chart  Date Finalized:3/30/22    Care Coordination    Follow up appointment with pcp: no 0  Transportation issues ?  NA    Value Base Outreach    Outreach type: 3 Day Outreach  Emergent necessity warranted by diagnosis: No  [facility] PCP: Yes  Practice Contacted Patient ?: No  Pt had ED follow up with pcp/staff ?: No    03/28/2022 12:19 PM Phone (VBI) Leola Kevin (Self) [phone] (H) Remove  Left Message - Outreach performed ED f/u on behalf of the PCP's office [facility]    By Fabiola Delgado    03/29/2022 11:34 AM Phone (VBI) Leola Kevin (Self) [phone] (H) Remove  Left Message - Outreach performed ED f/u on behalf of the PCP's office [facility]

## 2022-04-20 ENCOUNTER — OFFICE VISIT (OUTPATIENT)
Dept: OBGYN CLINIC | Facility: CLINIC | Age: 62
End: 2022-04-20
Payer: COMMERCIAL

## 2022-04-20 VITALS
BODY MASS INDEX: 31.91 KG/M2 | WEIGHT: 169 LBS | HEIGHT: 61 IN | HEART RATE: 94 BPM | SYSTOLIC BLOOD PRESSURE: 124 MMHG | DIASTOLIC BLOOD PRESSURE: 73 MMHG

## 2022-04-20 DIAGNOSIS — S93.411A SPRAIN OF CALCANEOFIBULAR LIGAMENT OF RIGHT ANKLE, INITIAL ENCOUNTER: ICD-10-CM

## 2022-04-20 DIAGNOSIS — S86.311A STRAIN OF PERONEAL TENDON, RIGHT, INITIAL ENCOUNTER: ICD-10-CM

## 2022-04-20 DIAGNOSIS — S93.401A MODERATE ANKLE SPRAIN, RIGHT, INITIAL ENCOUNTER: Primary | ICD-10-CM

## 2022-04-20 DIAGNOSIS — S93.491A SPRAIN OF ANTERIOR TALOFIBULAR LIGAMENT, RIGHT, INITIAL ENCOUNTER: ICD-10-CM

## 2022-04-20 PROCEDURE — 4004F PT TOBACCO SCREEN RCVD TLK: CPT | Performed by: FAMILY MEDICINE

## 2022-04-20 PROCEDURE — 3008F BODY MASS INDEX DOCD: CPT | Performed by: FAMILY MEDICINE

## 2022-04-20 PROCEDURE — 99203 OFFICE O/P NEW LOW 30 MIN: CPT | Performed by: FAMILY MEDICINE

## 2022-04-20 RX ORDER — RIMEGEPANT SULFATE 75 MG/75MG
TABLET, ORALLY DISINTEGRATING ORAL
COMMUNITY
Start: 2021-12-06
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
## 2022-04-21 DIAGNOSIS — G25.81 RLS (RESTLESS LEGS SYNDROME): ICD-10-CM

## 2022-04-21 RX ORDER — GABAPENTIN 100 MG/1
CAPSULE ORAL
Qty: 90 CAPSULE | Refills: 2 | Status: SHIPPED | OUTPATIENT
Start: 2022-04-21

## 2022-05-03 ENCOUNTER — APPOINTMENT (EMERGENCY)
Dept: CT IMAGING | Facility: HOSPITAL | Age: 62
End: 2022-05-03
Payer: COMMERCIAL

## 2022-05-03 ENCOUNTER — HOSPITAL ENCOUNTER (EMERGENCY)
Facility: HOSPITAL | Age: 62
Discharge: HOME | End: 2022-05-03
Attending: EMERGENCY MEDICINE | Admitting: EMERGENCY MEDICINE
Payer: COMMERCIAL

## 2022-05-03 VITALS
DIASTOLIC BLOOD PRESSURE: 76 MMHG | TEMPERATURE: 98.4 F | WEIGHT: 169 LBS | RESPIRATION RATE: 18 BRPM | BODY MASS INDEX: 31.93 KG/M2 | OXYGEN SATURATION: 99 % | HEART RATE: 83 BPM | SYSTOLIC BLOOD PRESSURE: 139 MMHG

## 2022-05-03 DIAGNOSIS — R51.9 HEADACHE: Primary | ICD-10-CM

## 2022-05-03 LAB
ALBUMIN SERPL BCP-MCNC: 3.8 G/DL (ref 3.5–5)
ALP SERPL-CCNC: 151 U/L (ref 46–116)
ALT SERPL W P-5'-P-CCNC: 25 U/L (ref 12–78)
ANION GAP SERPL CALCULATED.3IONS-SCNC: 7 MMOL/L (ref 4–13)
APTT PPP: 27 SECONDS (ref 23–37)
AST SERPL W P-5'-P-CCNC: 18 U/L (ref 5–45)
BASOPHILS # BLD AUTO: 0.06 THOUSANDS/ΜL (ref 0–0.1)
BASOPHILS NFR BLD AUTO: 1 % (ref 0–1)
BILIRUB SERPL-MCNC: 0.27 MG/DL (ref 0.2–1)
BUN SERPL-MCNC: 12 MG/DL (ref 5–25)
CALCIUM SERPL-MCNC: 9.4 MG/DL (ref 8.3–10.1)
CHLORIDE SERPL-SCNC: 105 MMOL/L (ref 100–108)
CO2 SERPL-SCNC: 28 MMOL/L (ref 21–32)
CREAT SERPL-MCNC: 0.79 MG/DL (ref 0.6–1.3)
EOSINOPHIL # BLD AUTO: 0.31 THOUSAND/ΜL (ref 0–0.61)
EOSINOPHIL NFR BLD AUTO: 3 % (ref 0–6)
ERYTHROCYTE [DISTWIDTH] IN BLOOD BY AUTOMATED COUNT: 13.1 % (ref 11.6–15.1)
GFR SERPL CREATININE-BSD FRML MDRD: 82 ML/MIN/1.73SQ M
GLUCOSE SERPL-MCNC: 105 MG/DL (ref 65–140)
HCT VFR BLD AUTO: 43.2 % (ref 34.8–46.1)
HGB BLD-MCNC: 13.9 G/DL (ref 11.5–15.4)
IMM GRANULOCYTES # BLD AUTO: 0.03 THOUSAND/UL (ref 0–0.2)
IMM GRANULOCYTES NFR BLD AUTO: 0 % (ref 0–2)
INR PPP: 0.92 (ref 0.84–1.19)
LYMPHOCYTES # BLD AUTO: 3.55 THOUSANDS/ΜL (ref 0.6–4.47)
LYMPHOCYTES NFR BLD AUTO: 37 % (ref 14–44)
MCH RBC QN AUTO: 32.5 PG (ref 26.8–34.3)
MCHC RBC AUTO-ENTMCNC: 32.2 G/DL (ref 31.4–37.4)
MCV RBC AUTO: 101 FL (ref 82–98)
MONOCYTES # BLD AUTO: 0.63 THOUSAND/ΜL (ref 0.17–1.22)
MONOCYTES NFR BLD AUTO: 7 % (ref 4–12)
NEUTROPHILS # BLD AUTO: 5.05 THOUSANDS/ΜL (ref 1.85–7.62)
NEUTS SEG NFR BLD AUTO: 52 % (ref 43–75)
NRBC BLD AUTO-RTO: 0 /100 WBCS
PLATELET # BLD AUTO: 235 THOUSANDS/UL (ref 149–390)
PMV BLD AUTO: 10.1 FL (ref 8.9–12.7)
POTASSIUM SERPL-SCNC: 4.2 MMOL/L (ref 3.5–5.3)
PROT SERPL-MCNC: 6.9 G/DL (ref 6.4–8.2)
PROTHROMBIN TIME: 12 SECONDS (ref 11.6–14.5)
RBC # BLD AUTO: 4.28 MILLION/UL (ref 3.81–5.12)
SODIUM SERPL-SCNC: 140 MMOL/L (ref 136–145)
WBC # BLD AUTO: 9.63 THOUSAND/UL (ref 4.31–10.16)

## 2022-05-03 PROCEDURE — 70450 CT HEAD/BRAIN W/O DYE: CPT

## 2022-05-03 PROCEDURE — 96361 HYDRATE IV INFUSION ADD-ON: CPT

## 2022-05-03 PROCEDURE — 85730 THROMBOPLASTIN TIME PARTIAL: CPT | Performed by: PHYSICIAN ASSISTANT

## 2022-05-03 PROCEDURE — 99284 EMERGENCY DEPT VISIT MOD MDM: CPT | Performed by: PHYSICIAN ASSISTANT

## 2022-05-03 PROCEDURE — 85610 PROTHROMBIN TIME: CPT | Performed by: PHYSICIAN ASSISTANT

## 2022-05-03 PROCEDURE — 96375 TX/PRO/DX INJ NEW DRUG ADDON: CPT

## 2022-05-03 PROCEDURE — 96365 THER/PROPH/DIAG IV INF INIT: CPT

## 2022-05-03 PROCEDURE — 36415 COLL VENOUS BLD VENIPUNCTURE: CPT | Performed by: PHYSICIAN ASSISTANT

## 2022-05-03 PROCEDURE — 99284 EMERGENCY DEPT VISIT MOD MDM: CPT

## 2022-05-03 PROCEDURE — 85025 COMPLETE CBC W/AUTO DIFF WBC: CPT | Performed by: PHYSICIAN ASSISTANT

## 2022-05-03 PROCEDURE — 80053 COMPREHEN METABOLIC PANEL: CPT | Performed by: PHYSICIAN ASSISTANT

## 2022-05-03 RX ORDER — DIPHENHYDRAMINE HYDROCHLORIDE 50 MG/ML
25 INJECTION INTRAMUSCULAR; INTRAVENOUS ONCE
Status: COMPLETED | OUTPATIENT
Start: 2022-05-03 | End: 2022-05-03

## 2022-05-03 RX ORDER — MAGNESIUM SULFATE HEPTAHYDRATE 40 MG/ML
2 INJECTION, SOLUTION INTRAVENOUS ONCE
Status: COMPLETED | OUTPATIENT
Start: 2022-05-03 | End: 2022-05-03

## 2022-05-03 RX ORDER — METOCLOPRAMIDE HYDROCHLORIDE 5 MG/ML
10 INJECTION INTRAMUSCULAR; INTRAVENOUS ONCE
Status: COMPLETED | OUTPATIENT
Start: 2022-05-03 | End: 2022-05-03

## 2022-05-03 RX ORDER — KETOROLAC TROMETHAMINE 30 MG/ML
15 INJECTION, SOLUTION INTRAMUSCULAR; INTRAVENOUS ONCE
Status: COMPLETED | OUTPATIENT
Start: 2022-05-03 | End: 2022-05-03

## 2022-05-03 RX ADMIN — METOCLOPRAMIDE HYDROCHLORIDE 10 MG: 5 INJECTION INTRAMUSCULAR; INTRAVENOUS at 09:04

## 2022-05-03 RX ADMIN — KETOROLAC TROMETHAMINE 15 MG: 30 INJECTION, SOLUTION INTRAMUSCULAR at 10:18

## 2022-05-03 RX ADMIN — MAGNESIUM SULFATE HEPTAHYDRATE 2 G: 40 INJECTION, SOLUTION INTRAVENOUS at 10:19

## 2022-05-03 RX ADMIN — SODIUM CHLORIDE 1000 ML: 0.9 INJECTION, SOLUTION INTRAVENOUS at 09:05

## 2022-05-03 RX ADMIN — DIPHENHYDRAMINE HYDROCHLORIDE 25 MG: 50 INJECTION INTRAMUSCULAR; INTRAVENOUS at 09:04

## 2022-05-03 NOTE — DISCHARGE INSTRUCTIONS
Rest, drink plenty of fluids, continue medications as directed  Please follow-up with your neurologist for further evaluation of symptoms  Return immediately to the emergency department with any new or worsening symptoms

## 2022-05-03 NOTE — Clinical Note
Carlos Blunt was seen and treated in our emergency department on 5/3/2022  Diagnosis: Seen in ED    Jennie Seoy    She may return on this date: 05/04/2022         If you have any questions or concerns, please don't hesitate to call        Nicole Braun PA-C    ______________________________           _______________          _______________  Hospital Representative                              Date                                Time
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
## 2022-05-06 ENCOUNTER — VBI (OUTPATIENT)
Dept: FAMILY MEDICINE CLINIC | Facility: CLINIC | Age: 62
End: 2022-05-06

## 2022-05-06 NOTE — TELEPHONE ENCOUNTER
Nikki Other    ED Visit Information     Ed visit date: 5-3-2022   Diagnosis Description: Headache      In Network? Yes Adriel Cool  Discharge status: Home  Discharged with meds ? No  Number of ED visits to date: 3  ED Severity:3     Outreach Information    Outreach successful: No 2  Date letter mailed:covid remote  Date Finalized: 5-    Care Coordination    Follow up appointment with pcp: no    Transportation issues ?  NA    Value Base Outreach    Outreach type: 3 Day Outreach  Emergent necessity warranted by diagnosis: No  [facility] PCP: Yes        05/06/2022 10:47 AM Phone (VBI) Malka Ramirez (Self) [phone] (H) Remove  Left Message - LM asking for call back for ED FU    By Valarie Pulido MA         05/11/2022 12:17 PM Phone (VBI) Malka Ramirez (Self) [phone] (H) Remove  Left Message - LM asking for call back for ED FU    By Valarie Pulido MA

## 2022-05-08 DIAGNOSIS — E78.2 MIXED HYPERLIPIDEMIA: ICD-10-CM

## 2022-05-08 RX ORDER — ROSUVASTATIN CALCIUM 10 MG/1
TABLET, COATED ORAL
Qty: 90 TABLET | Refills: 1 | Status: SHIPPED | OUTPATIENT
Start: 2022-05-08

## 2022-05-11 ENCOUNTER — EVALUATION (OUTPATIENT)
Dept: PHYSICAL THERAPY | Facility: CLINIC | Age: 62
End: 2022-05-11
Payer: COMMERCIAL

## 2022-05-11 DIAGNOSIS — S93.491A SPRAIN OF ANTERIOR TALOFIBULAR LIGAMENT, RIGHT, INITIAL ENCOUNTER: ICD-10-CM

## 2022-05-11 DIAGNOSIS — S93.401A MODERATE ANKLE SPRAIN, RIGHT, INITIAL ENCOUNTER: Primary | ICD-10-CM

## 2022-05-11 DIAGNOSIS — S86.311A STRAIN OF PERONEAL TENDON, RIGHT, INITIAL ENCOUNTER: ICD-10-CM

## 2022-05-11 DIAGNOSIS — S93.411A SPRAIN OF CALCANEOFIBULAR LIGAMENT OF RIGHT ANKLE, INITIAL ENCOUNTER: ICD-10-CM

## 2022-05-11 PROCEDURE — 97140 MANUAL THERAPY 1/> REGIONS: CPT

## 2022-05-11 PROCEDURE — 97161 PT EVAL LOW COMPLEX 20 MIN: CPT

## 2022-05-11 NOTE — PROGRESS NOTES
PT Evaluation     Today's date: 2022  Patient name: Chidi Clark  : 1960  MRN: [id number]  Referring provider: Britt Zaman DO  Dx:   Encounter Diagnosis     ICD-10-CM    1  Moderate ankle sprain, right, subsequent encounter  S93 401A Ambulatory Referral to Physical Therapy     PT plan of care cert/re-cert   2  Sprain of anterior talofibular ligament, right, subsequent encounter  S93 491A Ambulatory Referral to Physical Therapy     PT plan of care cert/re-cert   3  Sprain of calcaneofibular ligament of right ankle, susequent encounter  S93 411A Ambulatory Referral to Physical Therapy     PT plan of care cert/re-cert   4  Strain of peroneal tendon, right, subsequent encounter  S86 311A Ambulatory Referral to Physical Therapy     PT plan of care cert/re-cert       Start Time:   Stop Time:   Total time in clinic (min): 60 minutes    Assessment  Assessment details: Chidi Clark is a 63 y o  female presenting to physical therapy for an initial evaluation with a MD referral of moderate ankle sprain, right, sprain of anterior talofibular ligament, sprain of calcaneofibular ligament, strain of peroneal tendon  The patient demonstrates decreased right ankle ROM and strength, impairments in functional mobility and tolerance for weightbearing activities, as well as increased pain  These deficits have limited the patient's ability to complete ADLs, vocational responsibilities, and recreational activities  This patient would benefit from OP PT services to address their impairments and functional limitations to maximize functional mobility  The patient was provided a HEP and demonstrated/verbalized understanding  Trial of Kinesiotaping performed to lateral ankle to decrease swelling     Impairments: abnormal or restricted ROM, activity intolerance, impaired balance, impaired physical strength, lacks appropriate home exercise program, pain with function and weight-bearing intolerance    Symptom irritability: moderateUnderstanding of Dx/Px/POC: excellent   Prognosis: good    Goals  STG to be achieved in 4 weeks: The patient will report a decrease in right ankle "at worst" subjective pain rating score to at least 5/10 to allow for improved tolerance for weightbearing activities  The patient will increase right ankle dorsiflexion (kf) AROM to at least 5 degrees to allow for improved functional mobility  The patient will increase right ankle dorsiflexion MMT score to at least 4+/5 to allow for improved functional mobility  LTG to be achieved by DC: The patient will be independent in comprehensive HEP  The patient will report no pain with usual activities  The patient will improve right ankle AROM to [facility] to allow for improved functional mobility  The patient will increase right ankle MMT score to [facility] to allow for improved functional mobility  The patient will be able to return to mile walks with her dog at the park without difficulty  The patient will be able to stand for the duration of her workday without difficulty or pain  The patient will be able to complete a full flight of stairs with a reciprocal gait pattern without difficulty or pain  Plan  Patient would benefit from: skilled physical therapy  Planned modality interventions: thermotherapy: hydrocollator packs, TENS and cryotherapy  Planned therapy interventions: manual therapy, joint mobilization, balance/weight bearing training, neuromuscular re-education, patient education, flexibility, strengthening, stretching, therapeutic activities, therapeutic exercise, gait training and home exercise program  Frequency: 1-2x per week    Duration in weeks: 8  Plan of Care beginning date: 5/11/2022  Plan of Care expiration date: 7/6/2022  Treatment plan discussed with: patient        Subjective Evaluation    History of Present Illness  Mechanism of injury: Miki Blanton presents to therapy with a chief complaint of right ankle pain ongoing since 2022 after slipping on a dog toy at the bottom of the stairs  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, however by the end of her workday she had a great amount of swelling and pain in the ankle with weightbearing  Went to the ED on 3/26/22 regarding ankle where Xrays were unremarkable  She was referred to orthopedics where she was diagnosed with an ankle sprain  Orthopedics provided her with an ankle stabilizing brace, however patient reports that she hasn't been wearing it the past few weeks because of the difficulty putting on shoes as well as increased swelling in her foot and upper leg  States that since her injury she has increased difficulty and pain with walking  She also notes feelings of instability when walking which has prevented her from being able to take her dog for a walk at the park  States that she needs to stand and walk for the duration of her work day as a teacher and so by the end of the day she has an "extreme amount" of swelling and pain in the foot  Notes that the pain is a throbbing pain which starts at the lateral aspect of her ankle and then travels down into her toes  Also reports increased difficulty driving due to the various movements required by the ankle to do so  Reports that when performing stairs she has to complete them non-reciprocally, however prior to the injury she was able to complete them reciprocally  Notes the pain occurs moreso when descending stairs     Pain  Current pain ratin  At best pain ratin  At worst pain ratin  Location: Lateral aspect of the R ankle   Quality: throbbing (rubber band stretching)  Relieving factors: change in position, rest and ice  Aggravating factors: standing, walking and stair climbing  Progression: improved    Social Support  Steps to enter house: yes (1)  Stairs in house: yes (4)   Lives in: [place]  Lives with: alone    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[employer] )    Diagnostic Tests  X-ray: normal  Treatments  Previous treatment: medication  Current treatment: physical therapy  Patient Goals  Patient goals for therapy: decreased pain, increased strength and return to work  Patient goal: be able to walk my dog again         Objective     Tenderness     Right Ankle/Foot   Tenderness in the anterior ankle, anterior talofibular ligament and calcaneofibular ligament  No tenderness in the lateral malleolus, medial malleolus, peroneal tendon and posterior tibial tendon       Active Range of Motion   Left Ankle/Foot   Dorsiflexion (ke): 0 degrees   Dorsiflexion (kf): 5 degrees   Plantar flexion: 50 degrees   Inversion: 38 degrees   Eversion: 25 degrees     Right Ankle/Foot   Dorsiflexion (ke): -3 degrees with pain  Dorsiflexion (kf): 0 degrees with pain  Plantar flexion: 50 degrees with pain  Inversion: 30 degrees with pain  Eversion: 20 degrees with pain    Strength/Myotome Testing     Left Hip   Planes of Motion   Flexion: 4+    Right Hip   Planes of Motion   Flexion: 4+    Left Knee   Flexion: 5  Extension: 5    Right Knee   Flexion: 5  Extension: 5    Left Ankle/Foot   Dorsiflexion: 5  Plantar flexion: 5  Inversion: 5  Eversion: 5    Right Ankle/Foot   Dorsiflexion: 4-  Plantar flexion: 4-  Inversion: 4-  Eversion: 4-    Swelling   Left Ankle/Foot   Metatarsal heads: 22 cm  Figure 8: 54 5 cm  Malleoli: 24 5 cm    Right Ankle/Foot   Metatarsal heads: 21 8 cm  Figure 8: 55 cm  Malleoli: 25 2 cm             Precautions: Latex allergy, RA, hypothyroidism, FM, bilateral knee reconstructions, L plantar fasciectomy   Access Code: [email or web address]    Manuals 5/11            R ankle PROM             Kinesiotape to lateral ankle for swelling  BE                                      Neuro Re-Ed             Arch lifts             Tandem stance                                                                               Ther Ex             Rec bike             Seated BAPS board ankle DF/PF, inv/eversion,   cw/ccw Towel inv/eversion slides             Long sitting calf stretch with towel              TG HR             Ankle 4 ways TB                          Pt education HEP, PT POC, anatomy, physiology, taping                         Ther Activity             TG Squats             Fwd step ups                           Gait Training                                       Modalities                                       ·

## 2022-05-18 ENCOUNTER — OFFICE VISIT (OUTPATIENT)
Dept: PHYSICAL THERAPY | Facility: CLINIC | Age: 62
End: 2022-05-18
Payer: COMMERCIAL

## 2022-05-18 DIAGNOSIS — S93.411A SPRAIN OF CALCANEOFIBULAR LIGAMENT OF RIGHT ANKLE, INITIAL ENCOUNTER: ICD-10-CM

## 2022-05-18 DIAGNOSIS — S93.491A SPRAIN OF ANTERIOR TALOFIBULAR LIGAMENT, RIGHT, INITIAL ENCOUNTER: ICD-10-CM

## 2022-05-18 DIAGNOSIS — S86.311A STRAIN OF PERONEAL TENDON, RIGHT, INITIAL ENCOUNTER: ICD-10-CM

## 2022-05-18 DIAGNOSIS — S93.401A MODERATE ANKLE SPRAIN, RIGHT, INITIAL ENCOUNTER: Primary | ICD-10-CM

## 2022-05-18 PROCEDURE — 97140 MANUAL THERAPY 1/> REGIONS: CPT

## 2022-05-18 PROCEDURE — 97110 THERAPEUTIC EXERCISES: CPT

## 2022-05-18 NOTE — PROGRESS NOTES
Daily Note     Today's date: 2022  Patient name: Mike Burns  : 1960  MRN: [id number]  Referring provider: Lisa Godwin DO  Dx:   Encounter Diagnosis     ICD-10-CM    1  Moderate ankle sprain, right, subsequent encounter  S93 401A    2  Sprain of anterior talofibular ligament, right, subsequent encounter  S93 491A    3  Sprain of calcaneofibular ligament of right ankle, susequent encounter  S93 411A    4  Strain of peroneal tendon, right, subsequent encounter  S86 311A                   Subjective: Reports that the swelling has gone down and has been completing the exercises  Notes that she isn't wearing the brace because it is just more painful on her foot  Continues with sharp pains when walking or putting pressure solely on her right foot  Objective: See treatment diary below      Assessment: Initiated treatment session as outlined below; tolerated treatment well  Minor discomfort noted with talocrural posterior mobilizations secondary to therapist hand positioning; improved tolerance with adjustments to hand position  Patient challenged with BAPS board cw/ccw circles secondary to ROM deficits  Patient demonstrated fatigue post treatment, exhibited good technique with therapeutic exercises and would benefit from continued PT      Plan: Continue per plan of care        Precautions: Latex allergy, RA, hypothyroidism, FM, bilateral knee reconstructions, L plantar fasciectomy, migraines with cardiovascular exertion  Access Code: [email or web address]    Manuals            R ankle PROM  BE           Kinesiotape to lateral ankle for swelling  BE            R talocrural post mobs  Grade 3   BE                        Neuro Re-Ed             Arch lifts             Tandem stance                                                                               Ther Ex             Rec bike  L1 5'            Seated BAPS board ankle DF/PF, inv/eversion,   cw/ccw  x30 ea dir            Towel inv/eversion slides             Long sitting calf stretch with towel   3x30"           TG HR             Ankle 4 ways TB  RTB 2x10 ea                         Pt education HEP, PT POC, anatomy, physiology, taping                         Ther Activity             TG Squats             Fwd step ups                           Gait Training                                       Modalities                                           patient 1 on 1 on 5/18/22 for 38 minutes

## 2022-05-23 ENCOUNTER — APPOINTMENT (OUTPATIENT)
Dept: PHYSICAL THERAPY | Facility: CLINIC | Age: 62
End: 2022-05-23
Payer: COMMERCIAL

## 2022-05-23 NOTE — PROGRESS NOTES
Daily Note     Today's date: 2022  Patient name: Royce Pearson  : 1960  MRN: [id number]  Referring provider: Rian Salazar DO  Dx: No diagnosis found  Subjective: ***      Objective: See treatment diary below      Assessment: Tolerated treatment {Tolerated treatment :}   Patient {assessment:}      Plan: {PLAN:}     Precautions: Latex allergy, RA, hypothyroidism, FM, bilateral knee reconstructions, L plantar fasciectomy, migraines with cardiovascular exertion  Access Code: [email or web address]    Manuals           R ankle PROM  BE           Kinesiotape to lateral ankle for swelling  BE            R talocrural post mobs  Grade 3   BE                        Neuro Re-Ed             Arch lifts             Tandem stance                                                                               Ther Ex             Rec bike  L1 5'            Seated BAPS board ankle DF/PF, inv/eversion,   cw/ccw  x30 ea dir            Towel inv/eversion slides             Long sitting calf stretch with towel   3x30"           TG HR             Ankle 4 ways TB  RTB 2x10 ea                         Pt education HEP, PT POC, anatomy, physiology, taping                         Ther Activity             TG Squats             Fwd step ups                           Gait Training                                       Modalities

## 2022-05-31 ENCOUNTER — APPOINTMENT (OUTPATIENT)
Dept: PHYSICAL THERAPY | Facility: CLINIC | Age: 62
End: 2022-05-31
Payer: COMMERCIAL

## 2022-05-31 NOTE — PROGRESS NOTES
Daily Note     Today's date: 2022  Patient name: Ulysses Bracken  : 1960  MRN: [id number]  Referring provider: Damien Vance DO  Dx: No diagnosis found  Subjective: ***      Objective: See treatment diary below      Assessment: Tolerated treatment {Tolerated treatment :}   Patient {assessment:}      Plan: {PLAN:}     Precautions: Latex allergy, RA, hypothyroidism, FM, bilateral knee reconstructions, L plantar fasciectomy, migraines with cardiovascular exertion  Access Code: [email or web address]    Manuals           R ankle PROM  BE           Kinesiotape to lateral ankle for swelling  BE            R talocrural post mobs  Grade 3   BE                        Neuro Re-Ed             Arch lifts             Tandem stance                                                                               Ther Ex             Rec bike  L1 5'            Seated BAPS board ankle DF/PF, inv/eversion,   cw/ccw  x30 ea dir            Towel inv/eversion slides             Long sitting calf stretch with towel   3x30"           TG HR             Ankle 4 ways TB  RTB 2x10 ea                         Pt education HEP, PT POC, anatomy, physiology, taping                         Ther Activity             TG Squats             Fwd step ups                           Gait Training                                       Modalities

## 2022-06-02 ENCOUNTER — OFFICE VISIT (OUTPATIENT)
Dept: PHYSICAL THERAPY | Facility: CLINIC | Age: 62
End: 2022-06-02
Payer: COMMERCIAL

## 2022-06-02 DIAGNOSIS — S93.411A SPRAIN OF CALCANEOFIBULAR LIGAMENT OF RIGHT ANKLE, INITIAL ENCOUNTER: ICD-10-CM

## 2022-06-02 DIAGNOSIS — S93.491A SPRAIN OF ANTERIOR TALOFIBULAR LIGAMENT, RIGHT, INITIAL ENCOUNTER: ICD-10-CM

## 2022-06-02 DIAGNOSIS — S93.401A MODERATE ANKLE SPRAIN, RIGHT, INITIAL ENCOUNTER: Primary | ICD-10-CM

## 2022-06-02 DIAGNOSIS — S86.311A STRAIN OF PERONEAL TENDON, RIGHT, INITIAL ENCOUNTER: ICD-10-CM

## 2022-06-02 PROCEDURE — 97140 MANUAL THERAPY 1/> REGIONS: CPT

## 2022-06-02 PROCEDURE — 97110 THERAPEUTIC EXERCISES: CPT

## 2022-06-02 NOTE — PROGRESS NOTES
Daily Note     Today's date: 2022  Patient name: Royce Pearson  : 1960  MRN: [id number]  Referring provider: Rian Salazar DO  Dx: No diagnosis found  Subjective: pt noted that she is having a really bad pain day  Objective: See treatment diary below      Assessment: Continued with treatment session, pt noted felt okay with progression of  Inv and eversion with towel  Tolerated treatment fair  Patient exhibited good technique with therapeutic exercises and would benefit from continued PT s/p treatment session patient noted no significant changes  Plan: Continue per plan of care  Precautions: Latex allergy, RA, hypothyroidism, FM, bilateral knee reconstructions, L plantar fasciectomy, migraines with cardiovascular exertion  Access Code: [email or web address]    Manuals           R ankle PROM  BE SC          Kinesiotape to lateral ankle for swelling  BE            R talocrural post mobs  Grade 3   BE                        Neuro Re-Ed             Arch lifts             Tandem stance                                                                               Ther Ex             Rec bike  L1 5'  L1 8 min           Seated BAPS board ankle DF/PF, inv/eversion,   cw/ccw  x30 ea dir  30x ea             Towel inv/eversion slides   3 min           Long sitting calf stretch with towel   3x30" 3x 30"           TG HR             Ankle 4 ways TB  RTB 2x10 ea  RTB 2x 10                        Pt education HEP, PT POC, anatomy, physiology, taping                         Ther Activity             TG Squats             Fwd step ups                           Gait Training                                       Modalities                                             1 on 1 time for 38 minutes on 22

## 2022-06-04 DIAGNOSIS — M54.50 CHRONIC BILATERAL LOW BACK PAIN WITHOUT SCIATICA: ICD-10-CM

## 2022-06-04 DIAGNOSIS — G89.29 CHRONIC BILATERAL LOW BACK PAIN WITHOUT SCIATICA: ICD-10-CM

## 2022-06-05 RX ORDER — TRAMADOL HYDROCHLORIDE 50 MG/1
50 TABLET ORAL EVERY 8 HOURS PRN
Qty: 90 TABLET | Refills: 0 | Status: SHIPPED | OUTPATIENT
Start: 2022-06-05

## 2022-06-08 ENCOUNTER — EVALUATION (OUTPATIENT)
Dept: PHYSICAL THERAPY | Facility: CLINIC | Age: 62
End: 2022-06-08
Payer: COMMERCIAL

## 2022-06-08 DIAGNOSIS — S93.401A MODERATE ANKLE SPRAIN, RIGHT, INITIAL ENCOUNTER: Primary | ICD-10-CM

## 2022-06-08 DIAGNOSIS — S93.491A SPRAIN OF ANTERIOR TALOFIBULAR LIGAMENT, RIGHT, INITIAL ENCOUNTER: ICD-10-CM

## 2022-06-08 DIAGNOSIS — S93.411A SPRAIN OF CALCANEOFIBULAR LIGAMENT OF RIGHT ANKLE, INITIAL ENCOUNTER: ICD-10-CM

## 2022-06-08 DIAGNOSIS — S86.311A STRAIN OF PERONEAL TENDON, RIGHT, INITIAL ENCOUNTER: ICD-10-CM

## 2022-06-08 PROCEDURE — 97110 THERAPEUTIC EXERCISES: CPT

## 2022-06-08 PROCEDURE — 97112 NEUROMUSCULAR REEDUCATION: CPT

## 2022-06-08 NOTE — PROGRESS NOTES
PT Discharge    Today's date: 2022  Patient name: Ulysses Bracken  : 1960  MRN: [id number]  Referring provider: Damien Vance DO  Dx:   Encounter Diagnosis     ICD-10-CM    1  Moderate ankle sprain, right, subsequent encounter  S93 401A    2  Sprain of anterior talofibular ligament, right, subsequent encounter  S93 491A    3  Sprain of calcaneofibular ligament of right ankle, susequent encounter  S93 411A    4  Strain of peroneal tendon, right, subsequent encounter  S86 311A                   Assessment  Assessment details: Ulysses Bracken is a 63 y o  female presenting to physical therapy for a reevaluation with a MD referral of 80%  Since her initial evaluation, she reports 80% improvement in her ankle  The patient has demonstrated improved ankle ROM, strength, tolerance for weightbearing positions, and decreased swelling  She reports improved ability to complete functional activities around her home without difficulty, has met her personal goals, and would like to be DC to a comprehensive HEP at this time  She was provided an updated HEP and demonstrated/verbalized understanding it's completion  Patient to be DC from skilled PT services to a HEP at this time  Understanding of Dx/Px/POC: excellent   Prognosis: good    Goals  STG to be achieved in 4 weeks: The patient will report a decrease in right ankle "at worst" subjective pain rating score to at least 5/10 to allow for improved tolerance for weightbearing activities  MET  The patient will increase right ankle dorsiflexion (kf) AROM to at least 5 degrees to allow for improved functional mobility  MET  The patient will increase right ankle dorsiflexion MMT score to at least 4+/5 to allow for improved functional mobility  MET    LTG to be achieved by DC: The patient will be independent in comprehensive HEP  MET  The patient will report no pain with usual activities   MET  The patient will improve right ankle AROM to [facility] to allow for improved functional mobility  MET  The patient will increase right ankle MMT score to [facility] to allow for improved functional mobility  MET  The patient will be able to return to mile walks with her dog at the park without difficulty  MET   The patient will be able to stand for the duration of her workday without difficulty or pain  MET  The patient will be able to complete a full flight of stairs with a reciprocal gait pattern without difficulty or pain  MET      Plan  Plan of Care beginning date: 2022  Plan of Care expiration date: 2022  Treatment plan discussed with: patient        Subjective Evaluation    History of Present Illness  Mechanism of injury: Kenton Mercado reports 80% improvement since beginning PT services  She notes improvement in her mobility since initial evaluation  Reports that she is able to move her ankle in all directions without pain or difficulty  Denies difficulty driving  She reports that she hasn't completed any long distance walking at this point, however was able to walk 0 5 mile without pain or difficulty  Was able to tolerate standing for her work day much better, with less pain  Periodic breaks needed, however did not experience as much pain as she once did  States that she is able to ascend stairs without difficulty, has some continued difficulties with descending stairs due to pain with the degree of ankle flexion that occurs  She reports that they have continued difficulty with single leg balance as she notes she is shaky when standing on one leg when shaving her legs  Notes continued point tenderness to anterior aspect of ankle with sock or high sneaker pressure as well as some tenderness to her achilles tendon     Pain  Current pain ratin  At best pain ratin  At worst pain ratin  Location: Lateral aspect of the R ankle   Quality: dull ache  Relieving factors: change in position, rest and ice  Aggravating factors: standing, walking and stair climbing  Progression: improved    Social Support  Steps to enter house: yes (1)  Stairs in house: yes (4)   Lives in: one-story house  Lives with: alone    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[employer] )    Diagnostic Tests  X-ray: normal  Treatments  Previous treatment: medication  Current treatment: physical therapy  Patient Goals  Patient goals for therapy: decreased pain, increased strength and return to work  Patient goal: be able to walk my dog again         Objective     Tenderness     Right Ankle/Foot   Tenderness in the anterior ankle, anterior talofibular ligament and calcaneofibular ligament  No tenderness in the lateral malleolus, medial malleolus, peroneal tendon and posterior tibial tendon       Active Range of Motion   Left Ankle/Foot   Dorsiflexion (ke): 0 degrees   Dorsiflexion (kf): 5 degrees   Plantar flexion: 50 degrees   Inversion: 38 degrees   Eversion: 25 degrees     Right Ankle/Foot   Dorsiflexion (ke): 5 degrees   Dorsiflexion (kf): 8 degrees   Plantar flexion: 55 degrees   Inversion: 38 degrees   Eversion: 25 degrees     Passive Range of Motion     Right Ankle/Foot  Normal passive range of motion    Strength/Myotome Testing     Left Hip   Planes of Motion   Flexion: 4+    Right Hip   Planes of Motion   Flexion: 4+    Left Knee   Flexion: 5  Extension: 5    Right Knee   Flexion: 5  Extension: 5    Left Ankle/Foot   Dorsiflexion: 5  Plantar flexion: 5  Inversion: 5  Eversion: 5    Right Ankle/Foot   Dorsiflexion: 4+  Plantar flexion: 4+  Inversion: 4+  Eversion: 4+             Precautions: Latex allergy, RA, hypothyroidism, FM, bilateral knee reconstructions, L plantar fasciectomy   Access Code: [email or web address]    Manuals 5/11 5/18 6/2 6/8         R ankle PROM  BE SC          Kinesiotape to lateral ankle for swelling  BE            R talocrural post mobs  Grade 3   BE                        Neuro Re-Ed             Arch lifts    DC         Tandem stance     3x30"         R SLS    3x30"         Fwd chirag x10                                                Ther Ex             Rec bike  L1 5'  L1 8 min  L1 8 min         Seated BAPS board ankle DF/PF, inv/eversion,   cw/ccw  x30 ea dir  30x ea    DC         Towel inv/eversion slides   3 min  DC         Long sitting calf stretch with towel   3x30" 3x 30"  At step   3x30"          HR    2x10         Ankle 4 ways TB  RTB 2x10 ea  RTB 2x 10                        Pt education HEP, PT POC, anatomy, physiology, taping   HEP review, reasssessment                      Ther Activity             TG Squats    Mini squats- 2x10          Fwd step ups     x10                       Gait Training                                       Modalities

## 2022-06-15 ENCOUNTER — APPOINTMENT (OUTPATIENT)
Dept: PHYSICAL THERAPY | Facility: CLINIC | Age: 62
End: 2022-06-15
Payer: COMMERCIAL

## 2022-06-17 ENCOUNTER — APPOINTMENT (OUTPATIENT)
Dept: LAB | Facility: HOSPITAL | Age: 62
End: 2022-06-17
Payer: COMMERCIAL

## 2022-06-17 DIAGNOSIS — M79.7 FIBROMYOSITIS: ICD-10-CM

## 2022-06-17 LAB
CRP SERPL QL: 4.9 MG/L
ERYTHROCYTE [SEDIMENTATION RATE] IN BLOOD: 31 MM/HOUR (ref 0–29)

## 2022-06-17 PROCEDURE — 86140 C-REACTIVE PROTEIN: CPT

## 2022-06-17 PROCEDURE — 85652 RBC SED RATE AUTOMATED: CPT

## 2022-06-17 PROCEDURE — 36415 COLL VENOUS BLD VENIPUNCTURE: CPT

## 2022-07-01 DIAGNOSIS — E03.9 ACQUIRED HYPOTHYROIDISM: ICD-10-CM

## 2022-07-01 RX ORDER — LEVOTHYROXINE SODIUM 0.05 MG/1
TABLET ORAL
Qty: 90 TABLET | Refills: 1 | Status: SHIPPED | OUTPATIENT
Start: 2022-07-01

## 2022-07-13 ENCOUNTER — OFFICE VISIT (OUTPATIENT)
Dept: DERMATOLOGY | Facility: CLINIC | Age: 62
End: 2022-07-13
Payer: COMMERCIAL

## 2022-07-13 VITALS — BODY MASS INDEX: 32.1 KG/M2 | WEIGHT: 170 LBS | HEIGHT: 61 IN | TEMPERATURE: 96.8 F

## 2022-07-13 DIAGNOSIS — D48.5 NEOPLASM OF UNCERTAIN BEHAVIOR OF SKIN: ICD-10-CM

## 2022-07-13 DIAGNOSIS — L82.1 SEBORRHEIC KERATOSIS: Primary | ICD-10-CM

## 2022-07-13 DIAGNOSIS — L57.0 KERATOSIS, ACTINIC: ICD-10-CM

## 2022-07-13 DIAGNOSIS — D22.9 MULTIPLE MELANOCYTIC NEVI: ICD-10-CM

## 2022-07-13 PROCEDURE — 11102 TANGNTL BX SKIN SINGLE LES: CPT | Performed by: STUDENT IN AN ORGANIZED HEALTH CARE EDUCATION/TRAINING PROGRAM

## 2022-07-13 PROCEDURE — 17000 DESTRUCT PREMALG LESION: CPT | Performed by: STUDENT IN AN ORGANIZED HEALTH CARE EDUCATION/TRAINING PROGRAM

## 2022-07-13 PROCEDURE — 99204 OFFICE O/P NEW MOD 45 MIN: CPT | Performed by: STUDENT IN AN ORGANIZED HEALTH CARE EDUCATION/TRAINING PROGRAM

## 2022-07-13 PROCEDURE — 88305 TISSUE EXAM BY PATHOLOGIST: CPT | Performed by: STUDENT IN AN ORGANIZED HEALTH CARE EDUCATION/TRAINING PROGRAM

## 2022-07-13 RX ORDER — MAGNESIUM OXIDE 400 MG/1
400 TABLET ORAL DAILY
COMMUNITY

## 2022-07-13 RX ORDER — NALTREXONE HYDROCHLORIDE 50 MG/1
1 TABLET, FILM COATED ORAL DAILY
COMMUNITY

## 2022-07-13 RX ORDER — DULOXETIN HYDROCHLORIDE 60 MG/1
CAPSULE, DELAYED RELEASE ORAL
COMMUNITY
Start: 2022-05-16

## 2022-07-13 NOTE — PROGRESS NOTES
[facility] Note     Patient Name: Corrina Sepulveda  Encounter Date: 7/13/2022     Have you been cared for by a [facility] Dermatologist in the last 3 years and, if so, which one? No    · Have you traveled outside of the [address] [place] [address] in the past 3 months or outside of the [facility] area in the last 2 weeks? No     May we call your Preferred Phone number to discuss your specific medical information? Yes     May we leave a detailed message that includes your specific medical information? Yes      Today's Chief Concerns:   Concern #1:  Skin exam   Concern #2:      Past Medical History:  Have you personally ever had or currently have any of the following? · Skin cancer (such as Melanoma, Basal Cell Carcinoma, Squamous Cell Carcinoma? (If Yes, please provide more detail)- No  · Eczema: No  · Psoriasis: No  · HIV/AIDS: No  · Hepatitis B or C: No  · Tuberculosis: No  · Systemic Immunosuppression such as Diabetes, Biologic or Immunotherapy, Chemotherapy, Organ Transplantation, Bone Marrow Transplantation (If YES, please provide more detail): No  · Radiation Treatment (If YES, please provide more detail): No  · Any other major medical conditions/concerns? (If Yes, which types)- YES, RA, New daily headache    Social History:     What is/was your primary occupation? Teacher     What are your hobbies/past-times? Family History:  Have any of your "first degree relatives" (parent, brother, sister, or child) had any of the following       · Skin cancer such as Melanoma or Merkel Cell Carcinoma or Pancreatic Cancer? No  · Eczema, Asthma, Hay Fever or Seasonal Allergies: YES, Sister: Asthma; Son: seasonal allergies   · Psoriasis or Psoriatic Arthritis: No  · Do any other medical conditions seem to run in your family? If Yes, what condition and which relatives?   No    Current Medications:   (please update all dermatological medications before printing patient's AVS!)      Current Outpatient Medications:     acyclovir (ZOVIRAX) 5 % ointment, Apply topically every 3 (three) hours, Disp: 15 g, Rfl: 1    Albuterol Sulfate (ProAir RespiClick) 667 (90 Base) MCG/ACT AEPB, Inhale 2 puffs 4 (four) times a day as needed (wheezing), Disp: 3 each, Rfl: 1    Ascorbic Acid (vitamin C) 1000 MG tablet, Take 500 mg by mouth daily, Disp: , Rfl:     aspirin 81 MG tablet, Take 1 tablet by mouth daily, Disp: , Rfl:     Biotin w/ Vitamins C & E (HAIR/SKIN/NAILS PO), Take by mouth daily, Disp: , Rfl:     Cholecalciferol (Vitamin D3) 50 MCG (2000 UT) capsule, Take 2,000 Units by mouth daily , Disp: , Rfl:     Diclofenac Sodium (VOLTAREN) 1 %, Apply 2 g topically 4 (four) times a day, Disp: 100 g, Rfl: 0    gabapentin (NEURONTIN) 100 mg capsule, TAKE 1 CAPSULE AT BEDTIME FOR 3 DAYS,2 CAPS AT BED FOR 3 DAYS THEN 3 CAPS AT BEDTIME, Disp: 90 capsule, Rfl: 2    levocetirizine (XYZAL) 5 MG tablet, TAKE 1 TABLET BY MOUTH EVERY DAY, Disp: 90 tablet, Rfl: 2    levothyroxine 50 mcg tablet, TAKE 1 TABLET BY MOUTH EVERY DAY, Disp: 90 tablet, Rfl: 1    milk thistle 175 MG tablet, Take 175 mg by mouth daily, Disp: , Rfl:     naltrexone (REVIA) 50 mg tablet, Take 50 mg by mouth daily, Disp: , Rfl:     orphenadrine (NORFLEX) 100 mg tablet, Take 100 mg by mouth daily at bedtime , Disp: , Rfl:     Probiotic Product (PROBIOTIC-10) CAPS, Take by mouth , Disp: , Rfl:     Rimegepant Sulfate (Nurtec) 75 MG TBDP, Take one tablet by mouth as needed   Do not take more frequently than every other day , Disp: , Rfl:     rosuvastatin (CRESTOR) 10 MG tablet, TAKE 1 TABLET BY MOUTH EVERY DAY, Disp: 90 tablet, Rfl: 1    Toviaz 4 MG TB24, TAKE 1 TABLET BY MOUTH EVERY DAY, Disp: 90 tablet, Rfl: 5    VITAMIN B COMPLEX-C PO, Take 250 mg by mouth daily , Disp: , Rfl:     Xiidra 5 % op solution, INSTILL 1 DROP IN BOTH EYES 2 TIMES PER DAY, Disp: , Rfl:     cholestyramine (QUESTRAN) 4 g packet, Take 1 packet (4 g total) by mouth 3 (three) times a day with meals (Patient not taking: Reported on 7/13/2022), Disp: 90 packet, Rfl: 3    DULoxetine (CYMBALTA) 60 mg delayed release capsule, TAKE 1 CAPSULE DAILY AFTER FINISHING THE 30MG CAPSULES, Disp: , Rfl:     Erenumab-aooe (Aimovig) 79 MG/ML SOAJ, Inject under the skin (Patient not taking: Reported on 7/13/2022), Disp: , Rfl:     magnesium oxide (MAG-OX) 400 mg tablet, Take 400 mg by mouth daily, Disp: , Rfl:     methenamine hippurate (HIPREX) 1 g tablet, Take 1 mg by mouth 2 (two) times a day with meals  (Patient not taking: Reported on 7/13/2022), Disp: , Rfl:     methylPREDNISolone 4 MG tablet therapy pack, Use as directed on package (Patient not taking: Reported on 7/13/2022), Disp: 21 each, Rfl: 0    promethazine-codeine (PHENERGAN WITH CODEINE) 6 25-10 mg/5 mL syrup, Take 5 mL by mouth every 4 (four) hours as needed for cough (Patient not taking: Reported on 7/13/2022), Disp: 240 mL, Rfl: 0    Riboflavin 400 MG CAPS, Take 1 capsule by mouth daily, Disp: , Rfl:     traMADol (ULTRAM) 50 mg tablet, TAKE 1 TABLET (50 MG TOTAL) BY MOUTH EVERY 8 (EIGHT) HOURS AS NEEDED FOR MODERATE PAIN (Patient not taking: Reported on 7/13/2022), Disp: 90 tablet, Rfl: 0    valACYclovir (VALTREX) 500 mg tablet, Take 1 tablet (500 mg total) by mouth 3 (three) times a day for 7 days (Patient taking differently: Take 500 mg by mouth as needed ), Disp: 21 tablet, Rfl: 1      Review of Systems:  Have you recently had or currently have any of the following? If YES, what are you doing for the problem? · Fever, chills or unintended weight loss: No  · Sudden loss or change in your vision: No  · Nausea, vomiting or blood in your stool: No  · Painful or swollen joints: YES, RA, chronic   · Wheezing or cough: YES, cough at night due to smoking  · Changing mole or non-healing wound: YES, full body skin exam  · Nosebleeds: No  · Excessive sweating: No  · Easy or prolonged bleeding?   YES, 81 mg ASA  · Over the last 2 weeks, how often have you been bothered by the following problems? · Taking little interest or pleasure in doing things: 1 - Not at All  · Feeling down, depressed, or hopeless: 1 - Not at All  · Rapid heartbeat with epinephrine:  No    · FEMALES ONLY:    · Are you pregnant or planning to become pregnant? No  · Are you currently or planning to be nursing or breast feeding? No    · Any known allergies? Allergies   Allergen Reactions    Aminosyn Anaphylaxis    Contrast Dye  [Iodinated Diagnostic Agents] Anaphylaxis    Iohexol Anaphylaxis    Penicillins Hives    Procalamine Anaphylaxis    Dairy Aid [Lactase]      intolerence    Fentanyl Other (See Comments) and Irritability     Skin crawling      Morphine Other (See Comments) and Irritability     Skin crawling     Nsaids GI Intolerance    Amoxicillin Hives    Avocado - Food Allergy      Gi intolerence    Cefuroxime Hives    Ciprocinonide [Fluocinolone] Hives    Ciprofloxacin Hives    Codeine      Skin crawling    Eggs Or Egg-Derived Products - Food Allergy GI Bleeding     Stomach pains    Erythromycin Hives    Gluten Meal - Food Allergy     Macrobid [Nitrofurantoin] Hives    Other     Sulfa Antibiotics Hives    Sumatriptan Hives and Other (See Comments)     Diff breathing    Wheat Bran - Food Allergy     Zithromax [Azithromycin] Hives         Physical Exam:     Was a chaperone (Derm Clinical Assistant) present throughout the entire Physical Exam? Yes     Did the Dermatology Team specifically  the patient on the importance of a Full Skin Exam to be sure that nothing is missed clinically?  Yes}  o Did the patient ultimately request or accept a Full Skin Exam?  Yes  o Did the patient specifically refuse to have the areas "under-the-bra" examined by the Dermatologist? No  o Did the patient specifically refuse to have the areas "under-the-underwear" examined by the Dermatologist? No    CONSTITUTIONAL:   Vitals: 07/13/22 0941   Temp: (!) 96 8 °F (36 °C)   Weight: 77 1 kg (170 lb)   Height: 5' 1" (1 549 m)           PSYCH: Normal mood and affect  EYES: Normal conjunctiva  ENT: Normal lips and oral mucosa  CARDIOVASCULAR: No edema  RESPIRATORY: Normal respirations  HEME/LYMPH/IMMUNO:  No regional lymphadenopathy except as noted below in "ASSESSMENT AND PLAN BY DIAGNOSIS"    SKIN:  FULL ORGAN SYSTEM EXAM   Hair, Scalp, Ears, Face Normal except as noted below in Assessment   Neck, Cervical Chain Nodes Normal except as noted below in Assessment   Right Arm/Hand/Fingers Normal except as noted below in Assessment   Left Arm/Hand/Fingers Normal except as noted below in Assessment   Chest/Axillae    (pt deferred breast exam) Viewed areas Normal except as noted below in Assessment   Abdomen, Umbilicus Normal except as noted below in Assessment   Back/Spine Normal except as noted below in Assessment   Groin/Genitalia/Buttocks NOT EXAMINED--PT DEFERRED   Right Leg, Foot, Toes Normal except as noted below in Assessment   Left Leg, Foot, Toes Normal except as noted below in Assessment        Assessment and Plan by Diagnosis:    1  SEBORRHEIC KERATOSIS; NON-INFLAMED    Physical Exam:   Anatomic Location Affected:  Scattered over body   Morphological Description:  Flat and raised, waxy, smooth to warty textured, yellow to brownish-grey to dark brown to blackish, discrete, "stuck-on" appearing papules   Pertinent Positives:   Pertinent Negatives: Additional History of Present Condition:  Patient reports new bumps on the skin  Denies itch, burn, pain, bleeding or ulceration  Present constantly; nothing seems to make it worse or better  No prior treatment        Assessment and Plan:  Based on a thorough discussion of this condition and the management approach to it (including a comprehensive discussion of the known risks, side effects and potential benefits of treatment), the patient (family) agrees to implement the following specific plan:   Benign, assurance provided    Seborrheic Keratosis  A seborrheic keratosis is a harmless warty spot that appears during adult life as a common sign of skin aging  Seborrheic keratoses can arise on any area of skin, covered or uncovered, with the usual exception of the palms and soles  They do not arise from mucous membranes  Seborrheic keratoses can have highly variable appearance  Seborrheic keratoses are extremely common  It has been estimated that over 90% of adults over the age of 61 years have one or more of them  They occur in males and females of all races, typically beginning to erupt in the 35s or 45s  They are uncommon under the age of 21 years  The precise cause of seborrhoeic keratoses is not known  Seborrhoeic keratoses are considered degenerative in nature  As time goes by, seborrheic keratoses tend to become more numerous  Some people inherit a tendency to develop a very large number of them; some people may have hundreds of them  The name "seborrheic keratosis" is misleading, because these lesions are not limited to a seborrhoeic distribution (scalp, mid-face, chest, upper back), nor are they formed from sebaceous glands, nor are they associated with sebum -- which is greasy  Seborrheic keratosis may also be called "SK," "Seb K," "basal cell papilloma," "senile wart," or "barnacle "      Researchers have noted:   Eruptive seborrhoeic keratoses can follow sunburn or dermatitis   Skin friction may be the reason they appear in body folds   Viral cause (e g , human papillomavirus) seems unlikely   Stable and clonal mutations or activation of FRFR3, PIK3CA, ADRIAN, AKT1 and EGFR genes are found in seborrhoeic keratoses   Seborrhoeic keratosis can arise from solar lentigo   FRFR3 mutations also arise in solar lentigines   These mutations are associated with increased age and location on the head and neck, suggesting a role of ultraviolet radiation in these lesions   Seborrheic keratoses do not harbour tumour suppressor gene mutations   Epidermal growth factor receptor inhibitors, which are used to treat some cancers, often result in an increase in verrucal (warty) keratoses  There is no easy way to remove multiple lesions on a single occasion  Unless a specific lesion is "inflamed" and is causing pain or stinging/burning or is bleeding, most insurance companies do not authorize treatment  2   MELANOCYTIC NEVI ("Moles")    Physical Exam:   Anatomic Location Affected:    Scattered over body   Morphological Description:  Scattered, 1-4mm round to ovoid, symmetrical-appearing, even bordered, skin colored to dark brown macules/papules, mostly in sun-exposed areas   Pertinent Positives:   Pertinent Negatives: Additional History of Present Condition:  Found on exam     Assessment and Plan:  Based on a thorough discussion of this condition and the management approach to it (including a comprehensive discussion of the known risks, side effects and potential benefits of treatment), the patient (family) agrees to implement the following specific plan:   When outside we recommend using a wide brim hat, sunglasses, long sleeve and pants, sunscreen with SPF 78+ with reapplication every 2 hours, or SPF specific clothing    We recommend that you continue to have yearly full body skin exams     Melanocytic Nevi  Melanocytic nevi ("moles") are tan or brown, raised or flat areas of the skin which have an increased number of melanocytes  Melanocytes are the cells in our body which make pigment and account for skin color  Some moles are present at birth (I e , "congenital nevi"), while others come up later in life (i e , "acquired nevi")  The sun can stimulate the body to make more moles  Sunburns are not the only thing that triggers more moles  Chronic sun exposure can do it too       Clinically distinguishing a healthy mole from melanoma may be difficult, even for experienced dermatologists  The "ABCDE's" of moles have been suggested as a means of helping to alert a person to a suspicious mole and the possible increased risk of melanoma  The suggestions for raising alert are as follows:    Asymmetry: Healthy moles tend to be symmetric, while melanomas are often asymmetric  Asymmetry means if you draw a line through the mole, the two halves do not match in color, size, shape, or surface texture  Asymmetry can be a result of rapid enlargement of a mole, the development of a raised area on a previously flat lesion, scaling, ulceration, bleeding or scabbing within the mole  Any mole that starts to demonstrate "asymmetry" should be examined promptly by a board certified dermatologist      Border: Healthy moles tend to have discrete, even borders  The border of a melanoma often blends into the normal skin and does not sharply delineate the mole from normal skin  Any mole that starts to demonstrate "uneven borders" should be examined promptly by a board certified dermatologist      Color: Healthy moles tend to be one color throughout  Melanomas tend to be made up of different colors ranging from dark black, blue, white, or red  Any mole that demonstrates a color change should be examined promptly by a board certified dermatologist      Diameter: Healthy moles tend to be smaller than 0 6 cm in size; an exception are "congenital nevi" that can be larger  Melanomas tend to grow and can often be greater than 0 6 cm (1/4 of an inch, or the size of a pencil eraser)  This is only a guideline, and many normal moles may be larger than 0 6 cm without being unhealthy  Any mole that starts to change in size (small to bigger or bigger to smaller) should be examined promptly by a board certified dermatologist      Evolving: Healthy moles tend to "stay the same "  Melanomas may often show signs of change or evolution such as a change in size, shape, color, or elevation    Any mole that starts to itch, bleed, crust, burn, hurt, or ulcerate or demonstrate a change or evolution should be examined promptly by a board certified dermatologist       Dysplastic Nevi  Dysplastic moles are moles that fit the ABCDE rules of melanoma but are not identified as melanomas when examined under the microscope  They may indicate an increased risk of melanoma in that person  If there is a family history of melanoma, most experts agree that the person may be at an increased risk for developing a melanoma  Experts still do not agree on what dysplastic moles mean in patients without a personal or family history of melanoma  Dysplastic moles are usually larger than common moles and have different colors within it with irregular borders  The appearance can be very similar to a melanoma  Biopsies of dysplastic moles may show abnormalities which are different from a regular mole  Melanoma  Malignant melanoma is a type of skin cancer that can be deadly if it spreads throughout the body  The incidence of melanoma in the [place] is growing faster than any other cancer  Melanoma usually grows near the surface of the skin for a period of time, and then begins to grow deeper into the skin  Once it grows deeper into the skin, the risk of spread to other organs greatly increases  Therefore, early detection and removal of a malignant melanoma may result in a better chance at a complete cure; removal after the tumor has spread may not be as effective, leading to worse clinical outcomes such as death  The true rate of nevus transformation into a melanoma is unknown  It has been estimated that the lifetime risk for any acquired melanocytic nevus on any 21year-old individual transforming into melanoma by age [de-identified] is 0 03% (1 in 3,164) for men and 0 009% (1 in 10,800) for women  The appearance of a "new mole" remains one of the most reliable methods for identifying a malignant melanoma    Occasionally, melanomas appear as rapidly growing, blue-black, dome-shaped bumps within a previous mole or previous area of normal skin  Other times, melanomas are suspected when a mole suddenly appears or changes  Itching, burning, or pain in a pigmented lesion should increase suspicion, but most patients with early melanoma have no skin discomfort whatsoever  Melanoma can occur anywhere on the skin, including areas that are difficult for self-examination  Many melanomas are first noticed by other family members  Suspicious-looking moles may be removed for microscopic examination  You may be able to prevent death from melanoma by doing two simple things:    1  Try to avoid unnecessary sun exposure and protect your skin when it is exposed to the sun  People who live near the equator, people who have intermittent exposures to large amounts of sun, and people who have had sunburns in childhood or adolescence have an increased risk for melanoma  Sun sense and vigilant sun protection may be keys to helping to prevent melanoma  We recommend wearing UPF-rated sun protective clothing and sunglasses whenever possible and applying a moisturizer-sunscreen combination product (SPF 50+) such as Neutrogena Daily Defense to sun exposed areas of skin at least three times a day  2  Have your moles regularly examined by a board certified dermatologist AND by yourself or a family member/friend at home  We recommend that you have your moles examined at least once a year by a board certified dermatologist   Use your birthday as an annual reminder to have your "Birthday Suit" (I e , your skin) examined; it is a nice birthday gift to yourself to know that your skin is healthy appearing! Additionally, at-home self examinations may be helpful for detecting a possible melanoma  Use the ABCDEs we discussed and check your moles once a month at home  3   ACTINIC KERATOSIS    Physical Exam:   Anatomic Location Affected:   Face   Morphological Description:  pink papule    Additional History of Present Condition:  Found on exam today    Assessment and Plan:  Based on a thorough discussion of this condition and the management approach to it (including a comprehensive discussion of the known risks, side effects and potential benefits of treatment), the patient (family) agrees to implement the following specific plan:     Cryotherapy done in the office today  Patient signed the procedure consent    Actinic keratoses are very common on sites repeatedly exposed to the sun, especially the backs of the hands and the face, most often affecting the ears, nose, cheeks, upper lip, vermilion of the lower lip, temples, forehead and balding scalp  In severely chronically sun-damaged individuals, they may also be found on the upper trunk, upper and lower limbs, and dorsum of feet  We discussed the theoretical premalignant (pre-cancerous) nature and etiology of these growths  We discussed the prevailing notion that actinic keratoses are a reflection of abnormal skin cell development due to DNA damage by short wavelength UVB  They are more likely to appear if the immune function is poor, due to aging, recent sun exposure, predisposing disease or certain drugs  We discussed that the main concern is that actinic keratoses may predispose to squamous cell carcinoma  It is rare for a solitary actinic keratosis to evolve to squamous cell carcinoma (SCC), but the risk of SCC occurring at some stage in a patient with more than 10 actinic keratoses is thought to be about 10 to 15%  A tender, thickened, ulcerated or enlarging actinic keratosis is suspicious of SCC  Actinic keratoses may be prevented by strict sun protection  If already present, keratoses may improve with a very high sun protection factor (50+) broad-spectrum sunscreen applied at least daily to affected areas, year-round    We recommend that UPF-rated clothing and hats and sunglasses be worn whenever possible and that a sunscreen-moisturizer combination product such as Neutrogena Daily Defense be applied at least three times a day  We performed a thorough discussion of treatment options and specific risk/benefits/alternatives including but not limited to medical field treatment with medications such as the following:     Topical field area medications such as 5-fluorouracil or Aldara (specifically, the trouble with long-term compliance, blistering and local skin reaction versus the convenience of at-home therapy and that field therapy gets what is not yet seen)   Cryotherapy (specifically, local pain, scarring, dyspigmentation, blistering, possible superinfection, and treats only what we see versus directed treatment today)   Photodynamic therapy (specifically, local pain, scarring, dyspigmentation, blistering, possible superinfection, need to schedule for a later date, and time spent in the office versus field therapy that gets what is not yet seen)  PROCEDURE:  DESTRUCTION OF PRE-MALIGNANT LESIONS  After a thorough discussion of treatment options and risk/benefits/alternatives (including but not limited to local pain, scarring, dyspigmentation, blistering, and possible superinfection), verbal and written consent were obtained and the aforementioned lesions were treated on with cryotherapy using liquid nitrogen x 1 cycle for 5-10 seconds   TOTAL NUMBER of 1 pre-malignant lesions were treated today on the ANATOMIC LOCATION: Face  The patient tolerated the procedure well, and after-care instructions were provided  4  NEOPLASM OF UNCERTAIN BEHAVIOR OF SKIN    Physical Exam:   (Anatomic Location); (Size and Morphological Description); (Differential Diagnosis):  o A; Right back; 0 5 X 0 5 cm pink papule; Diff Dx: Nevus, Neurofibroma, skin tag   Pertinent Positives:   Pertinent Negatives:     Additional History of Present Condition:      Assessment and Plan:   I have discussed with the patient that a sample of skin via a "skin biopsy would be potentially helpful to further make a specific diagnosis under the microscope   Based on a thorough discussion of this condition and the management approach to it (including a comprehensive discussion of the known risks, side effects and potential benefits of treatment), the patient (family) agrees to implement the following specific plan:    o Procedure:  Skin Biopsy  After a thorough discussion of treatment options and risk/benefits/alternatives (including but not limited to local pain, scarring, dyspigmentation, blistering, possible superinfection, and inability to confirm a diagnosis via histopathology), verbal and written consent were obtained and portion of the rash was biopsied for tissue sample  See below for consent that was obtained from patient and subsequent Procedure Note  PROCEDURE TANGENTIAL (SHAVE) BIOPSY NOTE:     Performing Physician: Ila Carty Anatomic Location; Clinical Description with size (cm); Pre-Op Diagnosis:   A; Right back; 0 5 X 0 5 cm pink papule; Diff Dx: Nevus, Neurofibroma, skin tag   Post-op diagnosis: Same      Local anesthesia: 1% xylocaine with epi       Topical anesthesia: None     Hemostasis: Aluminum chloride       After obtaining informed consent  at which time there was a discussion about the purpose of biopsy  and low risks of infection and bleeding  The area was prepped and draped in the usual fashion  Anesthesia was obtained with 1% lidocaine with epinephrine  A shave biopsy to an appropriate sampling depth was obtained by Shave (Dermablade or 15 blade) The resulting wound was covered with surgical ointment and bandaged appropriately  The patient tolerated the procedure well without complications and was without signs of functional compromise  Specimen has been sent for review by Dermatopathology      Standard post-procedure care has been explained and has been included in written form within the patient's copy of Informed Consent  INFORMED CONSENT DISCUSSION AND POST-OPERATIVE INSTRUCTIONS FOR PATIENT    I   RATIONALE FOR PROCEDURE  I understand that a skin biopsy allows the Dermatologist to test a lesion or rash under the microscope to obtain a diagnosis  It usually involves numbing the area with numbing medication and removing a small piece of skin; sometimes the area will be closed with sutures  In this specific procedure, sutures are not usually needed  If any sutures are placed, then they are usually need to be removed in 2 weeks or less  I understand that my Dermatologist recommends that a skin "shave" biopsy be performed today  A local anesthetic, similar to the kind that a dentist uses when filling a cavity, will be injected with a very small needle into the skin area to be sampled  The injected skin and tissue underneath "will go to sleep and become numb so no pain should be felt afterwards  An instrument shaped like a tiny "razor blade" (shave biopsy instrument) will be used to cut a small piece of tissue and skin from the area so that a sample of tissue can be taken and examined more closely under the microscope  A slight amount of bleeding will occur, but it will be stopped with direct pressure and a pressure bandage and any other appropriate methods  I understands that a scar will form where the wound was created  Surgical ointment will be applied to help protect the wound  Sutures are not usually needed      II   RISKS AND POTENTIAL COMPLICATIONS   I understand the risks and potential complications of a skin biopsy include but are not limited to the following:   Bleeding   Infection   Pain   Scar/keloid   Skin discoloration   Incomplete Removal   Recurrence   Nerve Damage/Numbness/Loss of Function   Allergic Reaction to Anesthesia   Biopsies are diagnostic procedures and based on findings additional treatment or evaluation may be required   Loss or destruction of specimen resulting in no additional findings    My Dermatologist has explained to me the nature of the condition, the nature of the procedure, and the benefits to be reasonably expected compared with alternative approaches  My Dermatologist has discussed the likelihood of major risks or complications of this procedure including the specific risks listed above, such as bleeding, infection, and scarring/keloid  I understand that a scar is expected after this procedure  I understand that my physician cannot predict if the scar will form a "keloid," which extends beyond the borders of the wound that is created  A keloid is a thick, painful, and bumpy scar  A keloid can be difficult to treat, as it does not always respond well to therapy, which includes injecting cortisone directly into the keloid every few weeks  While this usually reduces the pain and size of the scar, it does not eliminate it  I understand that photographs may be taken before and after the procedure  These will be maintained as part of the medical providers confidential records and may not be made available to me  I further authorize the medical provider to use the photographs for teaching purposes or to illustrate scientific papers, books, or lectures if in his/her judgment, medical research, education, or science may benefit from its use  I have had an opportunity to fully inquire about the risks and benefits of this procedure and its alternatives  I have been given ample time and opportunity to ask questions and to seek a second opinion if I wished to do so  I acknowledge that there have specifically been no guarantees as to the cosmetic results from the procedure  I am aware that with any procedure there is always the possibility of an unexpected complication  III  POST-PROCEDURAL CARE (WHAT YOU WILL NEED TO DO "AFTER THE BIOPSY" TO OPTIMIZE HEALING)     Keep the area clean and dry    Try NOT to remove the bandage or get it wet for the first 24 hours   Gently clean the area and apply surgical ointment (such as Vaseline petrolatum ointment, which is available "over the counter" and not a prescription) to the biopsy site for up to 2 weeks straight  This acts to protect the wound from the outside world   Sutures are not usually placed in this procedure  If any sutures were placed, return for suture removal as instructed (generally 1 week for the face, 2 weeks for the body)   Take Acetaminophen (Tylenol) for discomfort, if no contraindications  Ibuprofen or aspirin could make bleeding worse   Call our office immediately for signs of infection: fever, chills, increased redness, warmth, tenderness, discomfort/pain, or pus or foul smell coming from the wound  WHAT TO DO IF THERE IS ANY BLEEDING? If a small amount of bleeding is noticed, place a clean cloth over the area and apply firm pressure for ten minutes  Check the wound after 10 minutes of direct pressure  If bleeding persists, try one more time for an additional 10 minutes of direct pressure on the area  If the bleeding becomes heavier or does not stop after the second attempt, or if you have any other questions about this procedure, then please call your [address] [place] [address]'s Dermatologist by calling [phone] ([phone])  I hereby acknowledge that I have reviewed and verified the site with my Dermatologist and have requested and authorized my Dermatologist to proceed with the procedure            Scribe Attestation    I,:  Ruth Lopez am acting as a scribe while in the presence of the attending physician :       I,:  Dewey Rodriguez MD personally performed the services described in this documentation    as scribed in my presence :

## 2022-07-13 NOTE — PATIENT INSTRUCTIONS
SEBORRHEIC KERATOSIS; NON-INFLAMED    Physical Exam:  Anatomic Location Affected:  Scattered over body  Morphological Description:  Flat and raised, waxy, smooth to warty textured, yellow to brownish-grey to dark brown to blackish, discrete, "stuck-on" appearing papules  Pertinent Positives:  Pertinent Negatives: Additional History of Present Condition:  Patient reports new bumps on the skin  Denies itch, burn, pain, bleeding or ulceration  Present constantly; nothing seems to make it worse or better  No prior treatment  Assessment and Plan:  Based on a thorough discussion of this condition and the management approach to it (including a comprehensive discussion of the known risks, side effects and potential benefits of treatment), the patient (family) agrees to implement the following specific plan:  Benign, assurance provided    Seborrheic Keratosis  A seborrheic keratosis is a harmless warty spot that appears during adult life as a common sign of skin aging  Seborrheic keratoses can arise on any area of skin, covered or uncovered, with the usual exception of the palms and soles  They do not arise from mucous membranes  Seborrheic keratoses can have highly variable appearance  Seborrheic keratoses are extremely common  It has been estimated that over 90% of adults over the age of 61 years have one or more of them  They occur in males and females of all races, typically beginning to erupt in the 35s or 45s  They are uncommon under the age of 21 years  The precise cause of seborrhoeic keratoses is not known  Seborrhoeic keratoses are considered degenerative in nature  As time goes by, seborrheic keratoses tend to become more numerous  Some people inherit a tendency to develop a very large number of them; some people may have hundreds of them      The name "seborrheic keratosis" is misleading, because these lesions are not limited to a seborrhoeic distribution (scalp, mid-face, chest, upper back), nor are they formed from sebaceous glands, nor are they associated with sebum -- which is greasy  Seborrheic keratosis may also be called "SK," "Seb K," "basal cell papilloma," "senile wart," or "barnacle "      Researchers have noted:  Eruptive seborrhoeic keratoses can follow sunburn or dermatitis  Skin friction may be the reason they appear in body folds  Viral cause (e g , human papillomavirus) seems unlikely  Stable and clonal mutations or activation of FRFR3, PIK3CA, ADRIAN, AKT1 and EGFR genes are found in seborrhoeic keratoses  Seborrhoeic keratosis can arise from solar lentigo  FRFR3 mutations also arise in solar lentigines  These mutations are associated with increased age and location on the head and neck, suggesting a role of ultraviolet radiation in these lesions  Seborrheic keratoses do not harbour tumour suppressor gene mutations  Epidermal growth factor receptor inhibitors, which are used to treat some cancers, often result in an increase in verrucal (warty) keratoses  There is no easy way to remove multiple lesions on a single occasion  Unless a specific lesion is "inflamed" and is causing pain or stinging/burning or is bleeding, most insurance companies do not authorize treatment  2   MELANOCYTIC NEVI ("Moles")    Physical Exam:  Anatomic Location Affected:   Mostly on sun-exposed areas of the Scattered over body  Morphological Description:  Scattered, 1-4mm round to ovoid, symmetrical-appearing, even bordered, skin colored to dark brown macules/papules, mostly in sun-exposed areas  Pertinent Positives:  Pertinent Negatives:     Additional History of Present Condition:  Found on exam     Assessment and Plan:  Based on a thorough discussion of this condition and the management approach to it (including a comprehensive discussion of the known risks, side effects and potential benefits of treatment), the patient (family) agrees to implement the following specific plan:  When outside we recommend using a wide brim hat, sunglasses, long sleeve and pants, sunscreen with SPF 06+ with reapplication every 2 hours, or SPF specific clothing   We recommend that you continue to have yearly full body skin exams     Melanocytic Nevi  Melanocytic nevi ("moles") are tan or brown, raised or flat areas of the skin which have an increased number of melanocytes  Melanocytes are the cells in our body which make pigment and account for skin color  Some moles are present at birth (I e , "congenital nevi"), while others come up later in life (i e , "acquired nevi")  The sun can stimulate the body to make more moles  Sunburns are not the only thing that triggers more moles  Chronic sun exposure can do it too  Clinically distinguishing a healthy mole from melanoma may be difficult, even for experienced dermatologists  The "ABCDE's" of moles have been suggested as a means of helping to alert a person to a suspicious mole and the possible increased risk of melanoma  The suggestions for raising alert are as follows:    Asymmetry: Healthy moles tend to be symmetric, while melanomas are often asymmetric  Asymmetry means if you draw a line through the mole, the two halves do not match in color, size, shape, or surface texture  Asymmetry can be a result of rapid enlargement of a mole, the development of a raised area on a previously flat lesion, scaling, ulceration, bleeding or scabbing within the mole  Any mole that starts to demonstrate "asymmetry" should be examined promptly by a board certified dermatologist      Border: Healthy moles tend to have discrete, even borders  The border of a melanoma often blends into the normal skin and does not sharply delineate the mole from normal skin  Any mole that starts to demonstrate "uneven borders" should be examined promptly by a board certified dermatologist      Color: Healthy moles tend to be one color throughout    Melanomas tend to be made up of different colors ranging from dark black, blue, white, or red  Any mole that demonstrates a color change should be examined promptly by a board certified dermatologist      Diameter: Healthy moles tend to be smaller than 0 6 cm in size; an exception are "congenital nevi" that can be larger  Melanomas tend to grow and can often be greater than 0 6 cm (1/4 of an inch, or the size of a pencil eraser)  This is only a guideline, and many normal moles may be larger than 0 6 cm without being unhealthy  Any mole that starts to change in size (small to bigger or bigger to smaller) should be examined promptly by a board certified dermatologist      Evolving: Healthy moles tend to "stay the same "  Melanomas may often show signs of change or evolution such as a change in size, shape, color, or elevation  Any mole that starts to itch, bleed, crust, burn, hurt, or ulcerate or demonstrate a change or evolution should be examined promptly by a board certified dermatologist       Dysplastic Nevi  Dysplastic moles are moles that fit the ABCDE rules of melanoma but are not identified as melanomas when examined under the microscope  They may indicate an increased risk of melanoma in that person  If there is a family history of melanoma, most experts agree that the person may be at an increased risk for developing a melanoma  Experts still do not agree on what dysplastic moles mean in patients without a personal or family history of melanoma  Dysplastic moles are usually larger than common moles and have different colors within it with irregular borders  The appearance can be very similar to a melanoma  Biopsies of dysplastic moles may show abnormalities which are different from a regular mole  Melanoma  Malignant melanoma is a type of skin cancer that can be deadly if it spreads throughout the body  The incidence of melanoma in the [place] is growing faster than any other cancer   Melanoma usually grows near the surface of the skin for a period of time, and then begins to grow deeper into the skin  Once it grows deeper into the skin, the risk of spread to other organs greatly increases  Therefore, early detection and removal of a malignant melanoma may result in a better chance at a complete cure; removal after the tumor has spread may not be as effective, leading to worse clinical outcomes such as death  The true rate of nevus transformation into a melanoma is unknown  It has been estimated that the lifetime risk for any acquired melanocytic nevus on any 21year-old individual transforming into melanoma by age [de-identified] is 0 03% (1 in 3,164) for men and 0 009% (1 in 10,800) for women  The appearance of a "new mole" remains one of the most reliable methods for identifying a malignant melanoma  Occasionally, melanomas appear as rapidly growing, blue-black, dome-shaped bumps within a previous mole or previous area of normal skin  Other times, melanomas are suspected when a mole suddenly appears or changes  Itching, burning, or pain in a pigmented lesion should increase suspicion, but most patients with early melanoma have no skin discomfort whatsoever  Melanoma can occur anywhere on the skin, including areas that are difficult for self-examination  Many melanomas are first noticed by other family members  Suspicious-looking moles may be removed for microscopic examination  You may be able to prevent death from melanoma by doing two simple things:    Try to avoid unnecessary sun exposure and protect your skin when it is exposed to the sun  People who live near the equator, people who have intermittent exposures to large amounts of sun, and people who have had sunburns in childhood or adolescence have an increased risk for melanoma  Sun sense and vigilant sun protection may be keys to helping to prevent melanoma    We recommend wearing UPF-rated sun protective clothing and sunglasses whenever possible and applying a moisturizer-sunscreen combination product (SPF 50+) such as Neutrogena Daily Defense to sun exposed areas of skin at least three times a day  Have your moles regularly examined by a board certified dermatologist AND by yourself or a family member/friend at home  We recommend that you have your moles examined at least once a year by a board certified dermatologist   Use your birthday as an annual reminder to have your "Birthday Suit" (I e , your skin) examined; it is a nice birthday gift to yourself to know that your skin is healthy appearing! Additionally, at-home self examinations may be helpful for detecting a possible melanoma  Use the ABCDEs we discussed and check your moles once a month at home  3   ACTINIC KERATOSIS    Physical Exam:  Anatomic Location Affected: Face  Morphological Description:  Scaly pink papules    Additional History of Present Condition:  Found on exam today    Assessment and Plan:  Based on a thorough discussion of this condition and the management approach to it (including a comprehensive discussion of the known risks, side effects and potential benefits of treatment), the patient (family) agrees to implement the following specific plan:    Cryotherapy done in the office today  Patient signed the procedure consent    Actinic keratoses are very common on sites repeatedly exposed to the sun, especially the backs of the hands and the face, most often affecting the ears, nose, cheeks, upper lip, vermilion of the lower lip, temples, forehead and balding scalp  In severely chronically sun-damaged individuals, they may also be found on the upper trunk, upper and lower limbs, and dorsum of feet  We discussed the theoretical premalignant (pre-cancerous) nature and etiology of these growths  We discussed the prevailing notion that actinic keratoses are a reflection of abnormal skin cell development due to DNA damage by short wavelength UVB    They are more likely to appear if the immune function is poor, due to aging, recent sun exposure, predisposing disease or certain drugs  We discussed that the main concern is that actinic keratoses may predispose to squamous cell carcinoma  It is rare for a solitary actinic keratosis to evolve to squamous cell carcinoma (SCC), but the risk of SCC occurring at some stage in a patient with more than 10 actinic keratoses is thought to be about 10 to 15%  A tender, thickened, ulcerated or enlarging actinic keratosis is suspicious of SCC  Actinic keratoses may be prevented by strict sun protection  If already present, keratoses may improve with a very high sun protection factor (50+) broad-spectrum sunscreen applied at least daily to affected areas, year-round  We recommend that UPF-rated clothing and hats and sunglasses be worn whenever possible and that a sunscreen-moisturizer combination product such as Neutrogena Daily Defense be applied at least three times a day  We performed a thorough discussion of treatment options and specific risk/benefits/alternatives including but not limited to medical field treatment with medications such as the following:    Topical field area medications such as 5-fluorouracil or Aldara (specifically, the trouble with long-term compliance, blistering and local skin reaction versus the convenience of at-home therapy and that field therapy gets what is not yet seen)  Cryotherapy (specifically, local pain, scarring, dyspigmentation, blistering, possible superinfection, and treats only what we see versus directed treatment today)  Photodynamic therapy (specifically, local pain, scarring, dyspigmentation, blistering, possible superinfection, need to schedule for a later date, and time spent in the office versus field therapy that gets what is not yet seen)      PROCEDURE:  DESTRUCTION OF PRE-MALIGNANT LESIONS  After a thorough discussion of treatment options and risk/benefits/alternatives (including but not limited to local pain, scarring, dyspigmentation, blistering, and possible superinfection), verbal and written consent were obtained and the aforementioned lesions were treated on with cryotherapy using liquid nitrogen x 1 cycle for 5-10 seconds  TOTAL NUMBER of 1 pre-malignant lesions were treated today on the ANATOMIC LOCATION: Face  The patient tolerated the procedure well, and after-care instructions were provided  4  NEOPLASM OF UNCERTAIN BEHAVIOR OF SKIN    Physical Exam:  (Anatomic Location); (Size and Morphological Description); (Differential Diagnosis):  A; Right back; 0 5 X 0 5 cm pink papule; Diff Dx: Nevus, Neurofibroma, skin tag  Pertinent Positives:  Pertinent Negatives: Additional History of Present Condition:      Assessment and Plan:  I have discussed with the patient that a sample of skin via a "skin biopsy would be potentially helpful to further make a specific diagnosis under the microscope  Based on a thorough discussion of this condition and the management approach to it (including a comprehensive discussion of the known risks, side effects and potential benefits of treatment), the patient (family) agrees to implement the following specific plan:    Procedure:  Skin Biopsy  After a thorough discussion of treatment options and risk/benefits/alternatives (including but not limited to local pain, scarring, dyspigmentation, blistering, possible superinfection, and inability to confirm a diagnosis via histopathology), verbal and written consent were obtained and portion of the rash was biopsied for tissue sample  See below for consent that was obtained from patient and subsequent Procedure Note      PROCEDURE TANGENTIAL (SHAVE) BIOPSY NOTE:    Performing Physician: Sierra Barron  Anatomic Location; Clinical Description with size (cm); Pre-Op Diagnosis:   A; Right back; 0 5 X 0 5 cm pink papule; Diff Dx: Nevus, Neurofibroma, skin tag  Post-op diagnosis: Same     Local anesthesia: 1% xylocaine with epi Topical anesthesia: None    Hemostasis: Aluminum chloride       After obtaining informed consent  at which time there was a discussion about the purpose of biopsy  and low risks of infection and bleeding  The area was prepped and draped in the usual fashion  Anesthesia was obtained with 1% lidocaine with epinephrine  A shave biopsy to an appropriate sampling depth was obtained by Shave (Dermablade or 15 blade) The resulting wound was covered with surgical ointment and bandaged appropriately  The patient tolerated the procedure well without complications and was without signs of functional compromise  Specimen has been sent for review by Dermatopathology  Standard post-procedure care has been explained and has been included in written form within the patient's copy of Informed Consent  INFORMED CONSENT DISCUSSION AND POST-OPERATIVE INSTRUCTIONS FOR PATIENT    I   RATIONALE FOR PROCEDURE  I understand that a skin biopsy allows the Dermatologist to test a lesion or rash under the microscope to obtain a diagnosis  It usually involves numbing the area with numbing medication and removing a small piece of skin; sometimes the area will be closed with sutures  In this specific procedure, sutures are not usually needed  If any sutures are placed, then they are usually need to be removed in 2 weeks or less  I understand that my Dermatologist recommends that a skin "shave" biopsy be performed today  A local anesthetic, similar to the kind that a dentist uses when filling a cavity, will be injected with a very small needle into the skin area to be sampled  The injected skin and tissue underneath "will go to sleep and become numb so no pain should be felt afterwards  An instrument shaped like a tiny "razor blade" (shave biopsy instrument) will be used to cut a small piece of tissue and skin from the area so that a sample of tissue can be taken and examined more closely under the microscope    A slight amount of bleeding will occur, but it will be stopped with direct pressure and a pressure bandage and any other appropriate methods  I understands that a scar will form where the wound was created  Surgical ointment will be applied to help protect the wound  Sutures are not usually needed  II   RISKS AND POTENTIAL COMPLICATIONS   I understand the risks and potential complications of a skin biopsy include but are not limited to the following:  Bleeding  Infection  Pain  Scar/keloid  Skin discoloration  Incomplete Removal  Recurrence  Nerve Damage/Numbness/Loss of Function  Allergic Reaction to Anesthesia  Biopsies are diagnostic procedures and based on findings additional treatment or evaluation may be required  Loss or destruction of specimen resulting in no additional findings    My Dermatologist has explained to me the nature of the condition, the nature of the procedure, and the benefits to be reasonably expected compared with alternative approaches  My Dermatologist has discussed the likelihood of major risks or complications of this procedure including the specific risks listed above, such as bleeding, infection, and scarring/keloid  I understand that a scar is expected after this procedure  I understand that my physician cannot predict if the scar will form a "keloid," which extends beyond the borders of the wound that is created  A keloid is a thick, painful, and bumpy scar  A keloid can be difficult to treat, as it does not always respond well to therapy, which includes injecting cortisone directly into the keloid every few weeks  While this usually reduces the pain and size of the scar, it does not eliminate it  I understand that photographs may be taken before and after the procedure  These will be maintained as part of the medical providers confidential records and may not be made available to me    I further authorize the medical provider to use the photographs for teaching purposes or to illustrate scientific papers, books, or lectures if in his/her judgment, medical research, education, or science may benefit from its use  I have had an opportunity to fully inquire about the risks and benefits of this procedure and its alternatives  I have been given ample time and opportunity to ask questions and to seek a second opinion if I wished to do so  I acknowledge that there have specifically been no guarantees as to the cosmetic results from the procedure  I am aware that with any procedure there is always the possibility of an unexpected complication  III  POST-PROCEDURAL CARE (WHAT YOU WILL NEED TO DO "AFTER THE BIOPSY" TO OPTIMIZE HEALING)    Keep the area clean and dry  Try NOT to remove the bandage or get it wet for the first 24 hours  Gently clean the area and apply surgical ointment (such as Vaseline petrolatum ointment, which is available "over the counter" and not a prescription) to the biopsy site for up to 2 weeks straight  This acts to protect the wound from the outside world  Sutures are not usually placed in this procedure  If any sutures were placed, return for suture removal as instructed (generally 1 week for the face, 2 weeks for the body)  Take Acetaminophen (Tylenol) for discomfort, if no contraindications  Ibuprofen or aspirin could make bleeding worse  Call our office immediately for signs of infection: fever, chills, increased redness, warmth, tenderness, discomfort/pain, or pus or foul smell coming from the wound  WHAT TO DO IF THERE IS ANY BLEEDING? If a small amount of bleeding is noticed, place a clean cloth over the area and apply firm pressure for ten minutes  Check the wound after 10 minutes of direct pressure  If bleeding persists, try one more time for an additional 10 minutes of direct pressure on the area    If the bleeding becomes heavier or does not stop after the second attempt, or if you have any other questions about this procedure, then please call your [facility]'s Dermatologist by calling [phone] (SKIN)  I hereby acknowledge that I have reviewed and verified the site with my Dermatologist and have requested and authorized my Dermatologist to proceed with the procedure

## 2022-07-25 NOTE — RESULT ENCOUNTER NOTE
Clinical derm team: please inform patient that biopsy shows a neurofibroma (benign growth, as expected)  Thank you

## 2022-09-23 ENCOUNTER — APPOINTMENT (OUTPATIENT)
Dept: LAB | Facility: HOSPITAL | Age: 62
End: 2022-09-23
Payer: COMMERCIAL

## 2022-09-23 DIAGNOSIS — M79.7 FIBROSITIS: ICD-10-CM

## 2022-09-23 LAB
CRP SERPL QL: <3 MG/L
ERYTHROCYTE [SEDIMENTATION RATE] IN BLOOD: 4 MM/HOUR (ref 0–29)

## 2022-09-23 PROCEDURE — 86140 C-REACTIVE PROTEIN: CPT

## 2022-09-23 PROCEDURE — 85652 RBC SED RATE AUTOMATED: CPT

## 2022-09-23 PROCEDURE — 36415 COLL VENOUS BLD VENIPUNCTURE: CPT

## 2022-10-04 ENCOUNTER — HOSPITAL ENCOUNTER (OUTPATIENT)
Dept: MRI IMAGING | Facility: HOSPITAL | Age: 62
Discharge: HOME | End: 2022-10-04
Attending: OTOLARYNGOLOGY
Payer: COMMERCIAL

## 2022-10-04 DIAGNOSIS — H91.21 SUDDEN HEARING LOSS, RIGHT: ICD-10-CM

## 2022-10-04 PROCEDURE — G1004 CDSM NDSC: HCPCS

## 2022-10-04 PROCEDURE — 70553 MRI BRAIN STEM W/O & W/DYE: CPT

## 2022-10-04 PROCEDURE — A9585 GADOBUTROL INJECTION: HCPCS | Performed by: OTOLARYNGOLOGY

## 2022-10-04 RX ADMIN — GADOBUTROL 8 ML: 604.72 INJECTION INTRAVENOUS at 17:11

## 2022-10-12 PROBLEM — Z00.00 HEALTH MAINTENANCE EXAMINATION: Status: RESOLVED | Noted: 2018-06-20 | Resolved: 2022-10-12

## 2022-11-02 ENCOUNTER — OFFICE VISIT (OUTPATIENT)
Dept: URGENT CARE | Facility: CLINIC | Age: 62
End: 2022-11-02

## 2022-11-02 VITALS — OXYGEN SATURATION: 97 % | RESPIRATION RATE: 20 BRPM | TEMPERATURE: 97.8 F | HEART RATE: 90 BPM

## 2022-11-02 DIAGNOSIS — R05.1 ACUTE COUGH: ICD-10-CM

## 2022-11-02 DIAGNOSIS — J20.9 ACUTE BRONCHITIS, UNSPECIFIED ORGANISM: Primary | ICD-10-CM

## 2022-11-02 DIAGNOSIS — J02.9 ACUTE PHARYNGITIS, UNSPECIFIED ETIOLOGY: ICD-10-CM

## 2022-11-02 LAB — S PYO AG THROAT QL: NEGATIVE

## 2022-11-02 RX ORDER — ALBUTEROL SULFATE 90 UG/1
2 AEROSOL, METERED RESPIRATORY (INHALATION) EVERY 6 HOURS PRN
Qty: 8.5 G | Refills: 0 | Status: SHIPPED | OUTPATIENT
Start: 2022-11-02

## 2022-11-02 RX ORDER — PROMETHAZINE HYDROCHLORIDE AND CODEINE PHOSPHATE 6.25; 1 MG/5ML; MG/5ML
5 SYRUP ORAL EVERY 4 HOURS PRN
Qty: 118 ML | Refills: 0 | Status: SHIPPED | OUTPATIENT
Start: 2022-11-02

## 2022-11-02 RX ORDER — DIVALPROEX SODIUM 250 MG/1
250 TABLET, DELAYED RELEASE ORAL EVERY 8 HOURS SCHEDULED
COMMUNITY

## 2022-11-02 RX ORDER — NALTREXONE 200; 6.5 MG/1; MG/1
IMPLANT SUBCUTANEOUS
COMMUNITY
Start: 2022-08-25

## 2022-11-02 NOTE — LETTER
November 2, 2022     Patient: Daniella Buitrago   YOB: 1960   Date of Visit: 11/2/2022       To Whom it May Concern:    Dennis Rafa was seen in my clinic on 11/2/2022  She should be excused 11/2/22  If you have any questions or concerns, please don't hesitate to call           Sincerely,          UGO Lanza        CC: No Recipients
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
## 2022-11-02 NOTE — LETTER
November 2, 2022     Patient: Jah Dobbs   YOB: 1960   Date of Visit: 11/2/2022       To Whom it May Concern:    Andrew Lunsford was seen in my clinic on 11/2/2022  She should be excused 10/31/22, 11/1, 11/2, and 11/3/22  If you have any questions or concerns, please don't hesitate to call           Sincerely,          UGO Anaya        CC: No Recipients

## 2022-11-03 LAB
FLUAV RNA RESP QL NAA+PROBE: NEGATIVE
FLUBV RNA RESP QL NAA+PROBE: NEGATIVE
SARS-COV-2 RNA RESP QL NAA+PROBE: NEGATIVE

## 2022-11-04 LAB — BACTERIA THROAT CULT: NORMAL

## 2022-11-04 NOTE — RESULT ENCOUNTER NOTE
Your COVID/Flu/throat culturestrep test result were all negative  Continue supportive care with over the counter medications  If any additional problems/concerns please follow up with your family doctor or return visit to care now  I hope you feel better soon! Attempted to call x2 but only got a chirping sound at the end of the call and unable to leave a message

## 2022-11-11 ENCOUNTER — VBI (OUTPATIENT)
Dept: ADMINISTRATIVE | Facility: OTHER | Age: 62
End: 2022-11-11

## 2022-11-15 DIAGNOSIS — G43.909 MIGRAINE WITHOUT STATUS MIGRAINOSUS, NOT INTRACTABLE, UNSPECIFIED MIGRAINE TYPE: Primary | ICD-10-CM

## 2022-11-15 DIAGNOSIS — G25.81 RLS (RESTLESS LEGS SYNDROME): ICD-10-CM

## 2022-11-16 RX ORDER — DULOXETIN HYDROCHLORIDE 60 MG/1
60 CAPSULE, DELAYED RELEASE ORAL DAILY
Qty: 90 CAPSULE | Refills: 3 | Status: SHIPPED | OUTPATIENT
Start: 2022-11-16

## 2022-11-16 RX ORDER — GABAPENTIN 100 MG/1
200 CAPSULE ORAL
Qty: 180 CAPSULE | Refills: 3 | Status: SHIPPED | OUTPATIENT
Start: 2022-11-16

## 2022-11-16 NOTE — TELEPHONE ENCOUNTER
Dr Johann Lobato with pt, she is taking 200 mg daily at bedtime  Rx request updated       Mara Merino/pk  11/16/22  9:00 AM

## 2022-12-05 ENCOUNTER — TELEPHONE (OUTPATIENT)
Dept: FAMILY MEDICINE CLINIC | Facility: CLINIC | Age: 62
End: 2022-12-05

## 2022-12-05 DIAGNOSIS — E78.2 MIXED HYPERLIPIDEMIA: ICD-10-CM

## 2022-12-05 DIAGNOSIS — G43.511 INTRACTABLE PERSISTENT MIGRAINE AURA WITHOUT CEREBRAL INFARCTION AND WITH STATUS MIGRAINOSUS: Primary | ICD-10-CM

## 2022-12-05 DIAGNOSIS — J30.1 CHRONIC SEASONAL ALLERGIC RHINITIS DUE TO POLLEN: ICD-10-CM

## 2022-12-05 RX ORDER — ROSUVASTATIN CALCIUM 10 MG/1
10 TABLET, COATED ORAL DAILY
Qty: 90 TABLET | Refills: 2 | Status: SHIPPED | OUTPATIENT
Start: 2022-12-05

## 2022-12-05 RX ORDER — LEVOCETIRIZINE DIHYDROCHLORIDE 5 MG/1
5 TABLET, FILM COATED ORAL DAILY
Qty: 90 TABLET | Refills: 2 | Status: SHIPPED | OUTPATIENT
Start: 2022-12-05

## 2022-12-05 NOTE — TELEPHONE ENCOUNTER
Patient had an appointment with you on 12/13/2022 for a med check  It was rescheduled to 1/10/2023 due to you now having a meeting that afternoon  Patient wants to know if you are willing to fill her crestor, nurtec, and levocetirizine until her appointment?

## 2022-12-07 RX ORDER — RIMEGEPANT SULFATE 75 MG/75MG
TABLET, ORALLY DISINTEGRATING ORAL
Status: CANCELLED | OUTPATIENT
Start: 2022-12-07

## 2022-12-07 NOTE — TELEPHONE ENCOUNTER
Patient was getting Nurtec from her Neurologist at [facility]  Her doctor there left and she has not gotten down there to get a new one  She states she would be prescribed 2 boxes of 8, 75mg pills  It is in her medications as Rimegepant Sulfate (Nurtec)

## 2022-12-08 RX ORDER — RIMEGEPANT SULFATE 75 MG/75MG
TABLET, ORALLY DISINTEGRATING ORAL
Qty: 16 TABLET | Refills: 5 | Status: SHIPPED | OUTPATIENT
Start: 2022-12-08 | End: 2022-12-12 | Stop reason: SDUPTHER

## 2022-12-12 DIAGNOSIS — G43.511 INTRACTABLE PERSISTENT MIGRAINE AURA WITHOUT CEREBRAL INFARCTION AND WITH STATUS MIGRAINOSUS: ICD-10-CM

## 2022-12-12 RX ORDER — RIMEGEPANT SULFATE 75 MG/75MG
TABLET, ORALLY DISINTEGRATING ORAL
Qty: 16 TABLET | Refills: 5 | Status: SHIPPED | OUTPATIENT
Start: 2022-12-12

## 2022-12-13 ENCOUNTER — TELEPHONE (OUTPATIENT)
Dept: FAMILY MEDICINE CLINIC | Facility: CLINIC | Age: 62
End: 2022-12-13

## 2022-12-13 NOTE — TELEPHONE ENCOUNTER
Your PA has been faxed to the plan as a paper copy  Please contact the plan directly if you haven't received a determination in a typical timeframe  You will be notified of the determination electronically and via fax      New Key Created:  [id number]

## 2022-12-13 NOTE — TELEPHONE ENCOUNTER
PA received from [facility] for Nurtec 75mg      KEY:  [id number]  : 1960    Proceed with PA?

## 2022-12-17 ENCOUNTER — APPOINTMENT (OUTPATIENT)
Dept: LAB | Facility: HOSPITAL | Age: 62
End: 2022-12-17

## 2022-12-17 DIAGNOSIS — M79.7 FIBROMYALGIA: ICD-10-CM

## 2022-12-17 LAB
CRP SERPL QL: 5.1 MG/L
ERYTHROCYTE [SEDIMENTATION RATE] IN BLOOD: 15 MM/HOUR (ref 0–29)

## 2023-01-06 ENCOUNTER — RA CDI HCC (OUTPATIENT)
Dept: OTHER | Facility: HOSPITAL | Age: 63
End: 2023-01-06

## 2023-01-10 ENCOUNTER — OFFICE VISIT (OUTPATIENT)
Dept: FAMILY MEDICINE CLINIC | Facility: CLINIC | Age: 63
End: 2023-01-10

## 2023-01-10 VITALS
OXYGEN SATURATION: 98 % | SYSTOLIC BLOOD PRESSURE: 140 MMHG | HEIGHT: 61 IN | BODY MASS INDEX: 32.77 KG/M2 | TEMPERATURE: 98.8 F | DIASTOLIC BLOOD PRESSURE: 78 MMHG | WEIGHT: 173.6 LBS | HEART RATE: 104 BPM

## 2023-01-10 DIAGNOSIS — E03.9 HYPOTHYROIDISM, UNSPECIFIED TYPE: ICD-10-CM

## 2023-01-10 DIAGNOSIS — Z00.00 HEALTH MAINTENANCE EXAMINATION: Primary | ICD-10-CM

## 2023-01-10 DIAGNOSIS — J30.1 CHRONIC SEASONAL ALLERGIC RHINITIS DUE TO POLLEN: ICD-10-CM

## 2023-01-10 DIAGNOSIS — K76.0 FATTY LIVER: ICD-10-CM

## 2023-01-10 DIAGNOSIS — H91.93 BILATERAL HEARING LOSS, UNSPECIFIED HEARING LOSS TYPE: ICD-10-CM

## 2023-01-10 DIAGNOSIS — G25.81 RLS (RESTLESS LEGS SYNDROME): ICD-10-CM

## 2023-01-10 DIAGNOSIS — N32.81 OAB (OVERACTIVE BLADDER): ICD-10-CM

## 2023-01-10 DIAGNOSIS — Z78.0 POST-MENOPAUSE: ICD-10-CM

## 2023-01-10 DIAGNOSIS — E78.2 MIXED HYPERLIPIDEMIA: ICD-10-CM

## 2023-01-10 DIAGNOSIS — G43.511 INTRACTABLE PERSISTENT MIGRAINE AURA WITHOUT CEREBRAL INFARCTION AND WITH STATUS MIGRAINOSUS: ICD-10-CM

## 2023-01-10 DIAGNOSIS — E55.9 VITAMIN D DEFICIENCY: ICD-10-CM

## 2023-01-10 PROBLEM — R10.32 LEFT LOWER QUADRANT PAIN: Status: RESOLVED | Noted: 2018-10-09 | Resolved: 2023-01-10

## 2023-01-10 PROBLEM — R74.8 ABNORMAL LIVER ENZYMES: Status: RESOLVED | Noted: 2017-11-10 | Resolved: 2023-01-10

## 2023-01-10 PROBLEM — S63.502A SPRAIN OF LEFT WRIST: Status: RESOLVED | Noted: 2021-08-04 | Resolved: 2023-01-10

## 2023-01-10 RX ORDER — GABAPENTIN 100 MG/1
200 CAPSULE ORAL
Qty: 180 CAPSULE | Refills: 3 | Status: SHIPPED | OUTPATIENT
Start: 2023-01-10

## 2023-01-10 RX ORDER — LEVOCETIRIZINE DIHYDROCHLORIDE 5 MG/1
5 TABLET, FILM COATED ORAL DAILY
Qty: 90 TABLET | Refills: 5 | Status: SHIPPED | OUTPATIENT
Start: 2023-01-10

## 2023-01-10 RX ORDER — FESOTERODINE FUMARATE 4 MG/1
1 TABLET, EXTENDED RELEASE ORAL DAILY
Qty: 90 TABLET | Refills: 5 | Status: SHIPPED | OUTPATIENT
Start: 2023-01-10

## 2023-01-10 RX ORDER — NALTREXONE HYDROCHLORIDE 50 MG/1
50 TABLET, FILM COATED ORAL DAILY
Qty: 30 TABLET | Refills: 5 | Status: SHIPPED | OUTPATIENT
Start: 2023-01-10

## 2023-01-10 NOTE — PROGRESS NOTES
Name: Dioni Gardiner      : 1960      MRN: [id number]  Encounter Provider: Charlotte Rivera MD  Encounter Date: 1/10/2023   Encounter department: [address] [place] [address]   Return visit in 1 year for annual physical  1  Health maintenance examination    2  Hypothyroidism, unspecified type    3  Intractable persistent migraine 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 with status migrainosus  -     naltrexone (REVIA) 50 mg tablet; Take 1 tablet (50 mg total) by mouth daily    4  Mixed hyperlipidemia  -     CBC and differential; Future  -     Comprehensive metabolic panel; Future  -     Lipid panel; Future    5  Fatty liver    6  Bilateral hearing loss, unspecified hearing loss type    7  Chronic seasonal allergic rhinitis due to pollen  -     levocetirizine (XYZAL) 5 MG tablet; Take 1 tablet (5 mg total) by mouth daily    8  Vitamin D deficiency  -     Vitamin D 25 hydroxy; Future    9  Post-menopause  -     DXA bone density spine hip and pelvis; Future; Expected date: 01/10/2023    10  OAB (overactive bladder)  -     Fesoterodine Fumarate ER 4 MG TB24; Take 1 tablet (4 mg total) by mouth daily    11  RLS (restless legs syndrome)  -     gabapentin (NEURONTIN) 100 mg capsule; Take 2 capsules (200 mg total) by mouth daily at bedtime      BMI Counseling: Body mass index is 32 8 kg/m²  The BMI is above normal  Nutrition recommendations include decreasing portion sizes and moderation in carbohydrate intake  Exercise recommendations include exercising 3-5 times per week  No pharmacotherapy was ordered  Rationale for BMI follow-up plan is due to patient being overweight or obese  Depression Screening and Follow-up Plan: Patient's depression screening was positive with a PHQ-2 score of 3  Their PHQ-9 score was 13  Subjective      Patient comes in for annual physical   She continues to have chronic daily migraine headaches    She is going to [facility] and has not had much improvement  Review of Systems   HENT: Positive for congestion and rhinorrhea  Respiratory: Negative  Cardiovascular: Negative  Musculoskeletal: Positive for arthralgias  Neurological: Positive for headaches         Current Outpatient Medications on File Prior to Visit   Medication Sig   • acyclovir (ZOVIRAX) 5 % ointment Apply topically every 3 (three) hours   • albuterol (ProAir HFA) 90 mcg/act inhaler Inhale 2 puffs every 6 (six) hours as needed for wheezing   • Ascorbic Acid (vitamin C) 1000 MG tablet Take 500 mg by mouth daily   • aspirin 81 MG tablet Take 1 tablet by mouth daily   • Biotin w/ Vitamins C & E (HAIR/SKIN/NAILS PO) Take by mouth daily   • Cholecalciferol (Vitamin D3) 50 MCG (2000 UT) capsule Take 2,000 Units by mouth daily    • Diclofenac Sodium (VOLTAREN) 1 % Apply 2 g topically 4 (four) times a day   • divalproex sodium (DEPAKOTE) 250 mg EC tablet Take 250 mg by mouth every 8 (eight) hours   • DULoxetine (CYMBALTA) 60 mg delayed release capsule Take 1 capsule (60 mg total) by mouth daily   • levothyroxine 50 mcg tablet TAKE 1 TABLET BY MOUTH EVERY DAY   • magnesium oxide (MAG-OX) 400 mg tablet Take 400 mg by mouth daily   • methenamine hippurate (HIPREX) 1 g tablet Take 1 mg by mouth 2 (two) times a day with meals   • milk thistle 175 MG tablet Take 175 mg by mouth daily   • Naltrexone 200-6 5 MG IMPL TAKE ONE TABLET BY MOUTH DAILY   • orphenadrine (NORFLEX) 100 mg tablet Take 100 mg by mouth daily at bedtime    • predniSONE 20 mg tablet 3 tabs day 1-5, 2 tabs day 6-10, 1 tab day 11-15   • Probiotic Product (PROBIOTIC-10) CAPS Take by mouth    • promethazine-codeine (PHENERGAN WITH CODEINE) 6 25-10 mg/5 mL syrup Take 5 mL by mouth every 4 (four) hours as needed for cough   • promethazine-codeine (PHENERGAN WITH CODEINE) 6 25-10 mg/5 mL syrup Take 5 mL by mouth every 4 (four) hours as needed for cough   • Riboflavin 400 MG CAPS Take 1 capsule by mouth daily   • Rimegepant Sulfate (Nurtec) 75 MG TBDP Take 1 tablet by mouth as needed for headache  Do not take more frequently than every other day  • rosuvastatin (CRESTOR) 10 MG tablet Take 1 tablet (10 mg total) by mouth daily   • Tart Cherry 1200 MG CAPS Take by mouth   • traMADol (ULTRAM) 50 mg tablet TAKE 1 TABLET (50 MG TOTAL) BY MOUTH EVERY 8 (EIGHT) HOURS AS NEEDED FOR MODERATE PAIN   • VITAMIN B COMPLEX-C PO Take 250 mg by mouth daily    • Xiidra 5 % op solution INSTILL 1 DROP IN BOTH EYES 2 TIMES PER DAY   • [DISCONTINUED] Fesoterodine Fumarate ER 4 MG TB24 TAKE 1 TABLET BY MOUTH EVERY DAY   • [DISCONTINUED] gabapentin (NEURONTIN) 100 mg capsule Take 2 capsules (200 mg total) by mouth daily at bedtime   • [DISCONTINUED] levocetirizine (XYZAL) 5 MG tablet Take 1 tablet (5 mg total) by mouth daily   • [DISCONTINUED] naltrexone (REVIA) 50 mg tablet Take 1 mg by mouth daily   • valACYclovir (VALTREX) 500 mg tablet Take 1 tablet (500 mg total) by mouth 3 (three) times a day for 7 days (Patient taking differently: Take 500 mg by mouth as needed)   • [DISCONTINUED] cholestyramine (QUESTRAN) 4 g packet Take 1 packet (4 g total) by mouth 3 (three) times a day with meals (Patient not taking: Reported on 7/13/2022)       Objective     /78 (BP Location: Left arm, Patient Position: Sitting, Cuff Size: Standard)   Pulse 104   Temp 98 8 °F (37 1 °C) (Tympanic)   Ht 5' 1" (1 549 m)   Wt 78 7 kg (173 lb 9 6 oz)   SpO2 98%   BMI 32 80 kg/m²     Physical Exam  Constitutional:       Appearance: She is well-developed  HENT:      Head: Normocephalic and atraumatic  Right Ear: Tympanic membrane, ear canal and external ear normal       Left Ear: Tympanic membrane, ear canal and external ear normal       Mouth/Throat:      Mouth: Mucous membranes are moist       Pharynx: Oropharynx is clear  Eyes:      Pupils: Pupils are equal, round, and reactive to light     Neck:      Thyroid: No thyromegaly  Vascular: No carotid bruit  Cardiovascular:      Rate and Rhythm: Normal rate and regular rhythm  Heart sounds: Normal heart sounds  Pulmonary:      Effort: Pulmonary effort is normal       Breath sounds: Normal breath sounds  Abdominal:      Palpations: Abdomen is soft  There is no mass  Tenderness: There is no abdominal tenderness  Musculoskeletal:         General: Normal range of motion  Cervical back: Neck supple  Lymphadenopathy:      Cervical: No cervical adenopathy  Skin:     General: Skin is warm and dry  Neurological:      Mental Status: She is alert     Psychiatric:         Mood and Affect: Mood normal          Behavior: Behavior normal        Na Sneed MD

## 2023-02-06 ENCOUNTER — TELEPHONE (OUTPATIENT)
Dept: FAMILY MEDICINE CLINIC | Facility: CLINIC | Age: 63
End: 2023-02-06

## 2023-02-06 NOTE — TELEPHONE ENCOUNTER
Fax request received from [address] - via CoverMyMed - Rimegepant Sulfate (Nurtec) 75 MG TBDP - requires prior auth  KEY : [id number]      Proceed with PA?     Please advise

## 2023-02-06 NOTE — TELEPHONE ENCOUNTER
Marty Mahajan Huang: [id number] - PA Case ID: [id number] - Rx #: [id number]  Need help?  Call us at [phone]  Status   Sent to [facility]

## 2023-02-10 NOTE — TELEPHONE ENCOUNTER
Fax letter received on 02/09/2023 - scanned into media  Approved coverage :  12/08/2022 - 02/07/2024  Pharmacy [facility]) / pt notified

## 2023-03-11 PROBLEM — Z00.00 HEALTH MAINTENANCE EXAMINATION: Status: RESOLVED | Noted: 2018-06-20 | Resolved: 2023-03-11

## 2023-05-19 DIAGNOSIS — E78.2 MIXED HYPERLIPIDEMIA: ICD-10-CM

## 2023-05-19 RX ORDER — ROSUVASTATIN CALCIUM 10 MG/1
TABLET, COATED ORAL
Qty: 90 TABLET | Refills: 2 | Status: SHIPPED | OUTPATIENT
Start: 2023-05-19

## 2023-06-06 ENCOUNTER — HOSPITAL ENCOUNTER (OUTPATIENT)
Dept: RADIOLOGY | Facility: HOSPITAL | Age: 63
Discharge: HOME | End: 2023-06-06
Payer: COMMERCIAL

## 2023-06-06 DIAGNOSIS — M54.2 CERVICALGIA: ICD-10-CM

## 2023-06-06 DIAGNOSIS — H92.01 OTALGIA, RIGHT EAR: ICD-10-CM

## 2023-06-06 PROCEDURE — 70490 CT SOFT TISSUE NECK W/O DYE: CPT

## 2023-06-06 PROCEDURE — G1004 CDSM NDSC: HCPCS

## 2023-07-16 DIAGNOSIS — G25.81 RLS (RESTLESS LEGS SYNDROME): ICD-10-CM

## 2023-07-16 RX ORDER — GABAPENTIN 100 MG/1
200 CAPSULE ORAL
Qty: 180 CAPSULE | Refills: 4 | Status: SHIPPED | OUTPATIENT
Start: 2023-07-16

## 2023-07-25 ENCOUNTER — VBI (OUTPATIENT)
Dept: ADMINISTRATIVE | Facility: OTHER | Age: 63
End: 2023-07-25

## 2023-11-01 ENCOUNTER — TELEPHONE (OUTPATIENT)
Dept: FAMILY MEDICINE CLINIC | Facility: CLINIC | Age: 63
End: 2023-11-01

## 2023-11-01 DIAGNOSIS — G43.511 INTRACTABLE PERSISTENT MIGRAINE AURA WITHOUT CEREBRAL INFARCTION AND WITH STATUS MIGRAINOSUS: ICD-10-CM

## 2023-11-01 DIAGNOSIS — G43.909 MIGRAINE WITHOUT STATUS MIGRAINOSUS, NOT INTRACTABLE, UNSPECIFIED MIGRAINE TYPE: ICD-10-CM

## 2023-11-01 NOTE — TELEPHONE ENCOUNTER
Patient called requesting MRI for left shoulder .  Patient states she has a possible pinched nerve patient has a upcoming appt for a New patient appt with Dr. Kathy Warren and would like to get the ball running so these testings would be ready prior to appt with him 11/8/2023

## 2023-11-02 RX ORDER — NALTREXONE HYDROCHLORIDE 50 MG/1
50 TABLET, FILM COATED ORAL DAILY
Qty: 30 TABLET | Refills: 5 | Status: SHIPPED | OUTPATIENT
Start: 2023-11-02

## 2023-11-02 RX ORDER — DULOXETIN HYDROCHLORIDE 60 MG/1
60 CAPSULE, DELAYED RELEASE ORAL DAILY
Qty: 90 CAPSULE | Refills: 3 | Status: SHIPPED | OUTPATIENT
Start: 2023-11-02

## 2023-11-06 ENCOUNTER — OFFICE VISIT (OUTPATIENT)
Dept: FAMILY MEDICINE CLINIC | Facility: CLINIC | Age: 63
End: 2023-11-06
Payer: COMMERCIAL

## 2023-11-06 ENCOUNTER — HOSPITAL ENCOUNTER (OUTPATIENT)
Dept: RADIOLOGY | Facility: HOSPITAL | Age: 63
Discharge: HOME | End: 2023-11-06
Payer: COMMERCIAL

## 2023-11-06 VITALS
OXYGEN SATURATION: 98 % | HEIGHT: 61 IN | DIASTOLIC BLOOD PRESSURE: 70 MMHG | BODY MASS INDEX: 33.42 KG/M2 | HEART RATE: 103 BPM | WEIGHT: 177 LBS | SYSTOLIC BLOOD PRESSURE: 104 MMHG | TEMPERATURE: 98 F

## 2023-11-06 DIAGNOSIS — M54.2 CERVICALGIA: ICD-10-CM

## 2023-11-06 DIAGNOSIS — E03.9 HYPOTHYROIDISM, UNSPECIFIED TYPE: ICD-10-CM

## 2023-11-06 DIAGNOSIS — E78.2 MIXED HYPERLIPIDEMIA: ICD-10-CM

## 2023-11-06 DIAGNOSIS — G43.511 INTRACTABLE PERSISTENT MIGRAINE AURA WITHOUT CEREBRAL INFARCTION AND WITH STATUS MIGRAINOSUS: ICD-10-CM

## 2023-11-06 DIAGNOSIS — M54.12 CERVICAL RADICULOPATHY: ICD-10-CM

## 2023-11-06 DIAGNOSIS — M54.2 CERVICALGIA: Primary | ICD-10-CM

## 2023-11-06 DIAGNOSIS — S86.811A STRAIN OF CALF MUSCLE, RIGHT, INITIAL ENCOUNTER: ICD-10-CM

## 2023-11-06 DIAGNOSIS — E55.9 VITAMIN D DEFICIENCY: ICD-10-CM

## 2023-11-06 PROCEDURE — 99214 OFFICE O/P EST MOD 30 MIN: CPT | Performed by: FAMILY MEDICINE

## 2023-11-06 PROCEDURE — 72050 X-RAY EXAM NECK SPINE 4/5VWS: CPT

## 2023-11-06 RX ORDER — PREDNISONE 10 MG/1
TABLET ORAL
Qty: 30 TABLET | Refills: 0 | Status: SHIPPED | OUTPATIENT
Start: 2023-11-06

## 2023-11-06 NOTE — PROGRESS NOTES
Name: Kristina Zavala      : 1960      MRN: [id number]  Encounter Provider: Farrah Robert MD  Encounter Date: 2023   Encounter department: [address] [place] [address] Road [address] [place] [address]     1. Cervicalgia  -     XR spine cervical complete 4 or 5 vw non injury; Future; Expected date: 2023  -     predniSONE 10 mg tablet; 4 daily x3, 3 daily x3. 2 dailvx3, 1 daily x3  -     Ambulatory Referral to Physical Therapy; Future    2. Cervical radiculopathy  -     Ambulatory Referral to Physical Therapy; Future    3. Strain of calf muscle, right, initial encounter  -     Ambulatory Referral to Physical Therapy; Future    4. Hypothyroidism, unspecified type  -     TSH, 3rd generation with Free T4 reflex; Future    5. Vitamin D deficiency  -     Vitamin D 25 hydroxy; Future    6. Mixed hyperlipidemia  Assessment & Plan:  Continue Crestor 10 mg daily    Orders:  -     CBC and differential; Future  -     Comprehensive metabolic panel; Future  -     Lipid panel; Future    7. Intractable persistent migraine 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 with status migrainosus  Assessment & Plan:  Continue Cymbalta 60 mg daily and Nurtec as needed          Tobacco Cessation Counseling: Tobacco cessation counseling was provided. The patient is sincerely urged to quit consumption of tobacco. She is not ready to quit tobacco.         Subjective      Patient comes in with about a 3-week history of neck pain with radiation down the left arm and right shoulder. Nazario Martinez was given Medrol Dosepak. Musculoskeletal weeks ago with some degree of relief. She also complains of pain in her right calf after straining  this while she was running. Has run out of thyroid medication has not taken this in several months. Review of Systems   HENT: Negative. Cardiovascular: Negative. Gastrointestinal: Negative. Musculoskeletal:  Positive for arthralgias and neck pain.        Current Outpatient Medications on File Prior to Visit   Medication Sig    acyclovir (ZOVIRAX) 5 % ointment Apply topically every 3 (three) hours    albuterol (ProAir HFA) 90 mcg/act inhaler Inhale 2 puffs every 6 (six) hours as needed for wheezing    Ascorbic Acid (vitamin C) 1000 MG tablet Take 500 mg by mouth daily    aspirin 81 MG tablet Take 1 tablet by mouth daily    Biotin w/ Vitamins C & E (HAIR/SKIN/NAILS PO) Take by mouth daily    Cholecalciferol (Vitamin D3) 50 MCG (2000 UT) capsule Take 2,000 Units by mouth daily     Diclofenac Sodium (VOLTAREN) 1 % Apply 2 g topically 4 (four) times a day    DULoxetine (CYMBALTA) 60 mg delayed release capsule TAKE 1 CAPSULE BY MOUTH EVERY DAY    Fesoterodine Fumarate ER 4 MG TB24 Take 1 tablet (4 mg total) by mouth daily    gabapentin (NEURONTIN) 100 mg capsule TAKE 2 CAPSULES (200 MG TOTAL) BY MOUTH DAILY AT BEDTIME    levocetirizine (XYZAL) 5 MG tablet Take 1 tablet (5 mg total) by mouth daily    levothyroxine 50 mcg tablet TAKE 1 TABLET BY MOUTH EVERY DAY    magnesium oxide (MAG-OX) 400 mg tablet Take 400 mg by mouth daily    methenamine hippurate (HIPREX) 1 g tablet Take 1 mg by mouth 2 (two) times a day with meals    milk thistle 175 MG tablet Take 175 mg by mouth daily    naltrexone (REVIA) 50 mg tablet TAKE 1 TABLET BY MOUTH EVERY DAY    orphenadrine (NORFLEX) 100 mg tablet Take 100 mg by mouth daily at bedtime     Probiotic Product (PROBIOTIC-10) CAPS Take by mouth     Riboflavin 400 MG CAPS Take 1 capsule by mouth daily    Rimegepant Sulfate (Nurtec) 75 MG TBDP Take 1 tablet by mouth as needed for headache. Do not take more frequently than every other day.     rosuvastatin (CRESTOR) 10 MG tablet TAKE 1 TABLET BY MOUTH EVERY DAY    VITAMIN B COMPLEX-C PO Take 250 mg by mouth daily     Xiidra 5 % op solution INSTILL 1 DROP IN BOTH EYES 2 TIMES PER DAY    divalproex sodium (DEPAKOTE) 250 mg EC tablet Take 250 mg by mouth every 8 (eight) hours (Patient not taking: Reported on 5/18/2023)    Naltrexone 200-6.5 MG IMPL TAKE ONE TABLET BY MOUTH DAILY (Patient not taking: Reported on 5/18/2023)    Tart Cherry 1200 MG CAPS Take by mouth (Patient not taking: Reported on 5/18/2023)    valACYclovir (VALTREX) 500 mg tablet Take 1 tablet (500 mg total) by mouth 3 (three) times a day for 7 days (Patient not taking: Reported on 5/18/2023)    [DISCONTINUED] predniSONE 20 mg tablet 3 tabs day 1-5, 2 tabs day 6-10, 1 tab day 11-15 (Patient not taking: Reported on 5/18/2023)    [DISCONTINUED] promethazine-codeine (PHENERGAN WITH CODEINE) 6.25-10 mg/5 mL syrup Take 5 mL by mouth every 4 (four) hours as needed for cough (Patient not taking: Reported on 5/18/2023)    [DISCONTINUED] promethazine-codeine (PHENERGAN WITH CODEINE) 6.25-10 mg/5 mL syrup Take 5 mL by mouth every 4 (four) hours as needed for cough (Patient not taking: Reported on 5/18/2023)    [DISCONTINUED] traMADol (ULTRAM) 50 mg tablet TAKE 1 TABLET (50 MG TOTAL) BY MOUTH EVERY 8 (EIGHT) HOURS AS NEEDED FOR MODERATE PAIN (Patient not taking: Reported on 5/18/2023)       Objective     /70 (BP Location: Left arm, Patient Position: Sitting, Cuff Size: Standard)   Pulse 103   Temp 98 °F (36.7 °C) (Tympanic)   Ht 5' 1" (1.549 m)   Wt 80.3 kg (177 lb)   SpO2 98%   BMI 33.44 kg/m²     Physical Exam  Constitutional:       Appearance: She is obese. HENT:      Head: Normocephalic and atraumatic. Musculoskeletal:      Comments: Cervical paraspinal muscles. Pain with rotation of head to right and left. Tender right calf. Pain with forced flexion of right foot. Neurological:      Mental Status: She is alert.    Psychiatric:         Mood and Affect: Mood normal.         Behavior: Behavior normal.       Alexandra aMrtinez MD

## 2023-11-30 ENCOUNTER — EVALUATION (OUTPATIENT)
Dept: PHYSICAL THERAPY | Facility: CLINIC | Age: 63
End: 2023-11-30
Payer: COMMERCIAL

## 2023-11-30 DIAGNOSIS — S86.811A STRAIN OF CALF MUSCLE, RIGHT, INITIAL ENCOUNTER: ICD-10-CM

## 2023-11-30 DIAGNOSIS — M54.12 CERVICAL RADICULOPATHY: ICD-10-CM

## 2023-11-30 DIAGNOSIS — M54.2 CERVICALGIA: ICD-10-CM

## 2023-11-30 PROCEDURE — 97163 PT EVAL HIGH COMPLEX 45 MIN: CPT | Performed by: PHYSICAL THERAPIST

## 2023-11-30 PROCEDURE — 97140 MANUAL THERAPY 1/> REGIONS: CPT | Performed by: PHYSICAL THERAPIST

## 2023-11-30 NOTE — PROGRESS NOTES
PT Evaluation     Today's date: 2023  Patient name: Manohar Chester  : 1960  MRN: [id number]  Referring provider: Riana Alonzo MD  Dx:   Encounter Diagnosis     ICD-10-CM    1. Cervicalgia  M54.2 Ambulatory Referral to Physical Therapy      2. Cervical radiculopathy  M54.12 Ambulatory Referral to Physical Therapy      3. Strain of calf muscle, right, initial encounter  F03.311Y Ambulatory Referral to Physical Therapy                     Assessment  Assessment details: Pt is a 67 y/o female who presents to physical therapy with primary peripheral neuropathic pain associated with L cervical radiculopathy in the environment of significantly reduced cervical ROM complicated by post covid migraines. Pt does not present with any red flag symptoms at this time. Due to severity and irritability, limited objective testing and more time used to find symptoms relieving treatments. Supine segmental distraction seemed to be the most effective treatment today. Education provided regarding POC and pathoanatomy, pt verablized understanding. Pt would benefit from skilled physical therapy in order to decrease deficits and return to prior level of function. Impairments: abnormal or restricted ROM, activity intolerance, impaired physical strength and pain with function  Understanding of Dx/Px/POC: good  Goals  STG (4 weeks):  Pt will be independent with HEP. Pt will demonstrate increase in cervical rotation ROM by 10d to the L.    LTG (8 weeks): FOTO will be expected outcome. Pt will demonstrate cervical ROT ROM comparable to contralateral side. Pt will demonstrate normal jt mobility compared to contralateral side. Pt will demonstrate negative neuromechanical sensitivity testing.       Plan  Patient would benefit from: skilled physical therapy  Planned modality interventions: cryotherapy  Planned therapy interventions: manual therapy, neuromuscular re-education, patient education, self care, strengthening, stretching, therapeutic activities, therapeutic exercise and home exercise program  Frequency: 1-2x/week. Duration in weeks: 8  Treatment plan discussed with: patient    Subjective Evaluation    History of Present Illness  Mechanism of injury: Chief Complaint: Pt reports that beginning in October where she woke up with neck stiffness. She states that 2 days later she began to have shld pain. She states that it progressively worsened until she was given a dose pack of prednisone which helped relieve some of the pain. She reports acupuncture and chiro help minimally. Severity: high  Irritability: moderate  Nature: peripheral neuropathic  Stage: chronic  Stability: improving    P1: see body chart    Occupation:   Patient Goals  Patient goal: get rid of tingling in the arm    Objective     Neurological Testing     Reflexes   Left   Biceps (C5/C6): normal (2+)  Brachioradialis (C6): normal (2+)  Triceps (C7): normal (2+)    Right   Biceps (C5/C6): normal (2+)  Brachioradialis (C6): normal (2+)  Triceps (C7): normal (2+)    Additional Neurological Details  Dermatome- dysesthesia in the C6 dermatome  Myotome- reduced tricep strength on the L    Active Range of Motion     Additional Active Range of Motion Details  FB: WNL  BB: 50% with immediate pain into the L shoulder and increased buzzing in the L arm  R ROT: 60d  L ROT: 50d block, no increase in symptoms  Retraction: significantly limited and increase in L shoulder and arm symptoms             Precautions: chronic neck symptoms, post covid migraines, light sensitivity    Therapeutic exercise = (TE)  Therapeutic activity= (TA)  Manual Therapy= (MT)  Neuromuscular re-education- (NRE)  Gait training= (GT)    MT 1. Seated distraction- no improvement  MT 2. Seated distraction with rotation- no improvement and increase tingling in the L arm  MT 3.  Supine distraction- 3' reduced  *reduced L arm tingling, as well as L shld pain

## 2023-12-04 ENCOUNTER — APPOINTMENT (OUTPATIENT)
Dept: PHYSICAL THERAPY | Facility: CLINIC | Age: 63
End: 2023-12-04
Payer: COMMERCIAL

## 2023-12-11 ENCOUNTER — OFFICE VISIT (OUTPATIENT)
Dept: PHYSICAL THERAPY | Facility: CLINIC | Age: 63
End: 2023-12-11
Payer: COMMERCIAL

## 2023-12-11 DIAGNOSIS — M54.2 CERVICALGIA: Primary | ICD-10-CM

## 2023-12-11 DIAGNOSIS — M54.12 CERVICAL RADICULOPATHY: ICD-10-CM

## 2023-12-11 DIAGNOSIS — S86.811A STRAIN OF CALF MUSCLE, RIGHT, INITIAL ENCOUNTER: ICD-10-CM

## 2023-12-11 PROCEDURE — 97140 MANUAL THERAPY 1/> REGIONS: CPT | Performed by: PHYSICAL THERAPIST

## 2023-12-11 PROCEDURE — 97012 MECHANICAL TRACTION THERAPY: CPT | Performed by: PHYSICAL THERAPIST

## 2023-12-11 NOTE — PROGRESS NOTES
Daily Note     Today's date: 2023  Patient name: Joshua Velásquez  : 1960  MRN: [id number]  Referring provider: Carlene Tim MD  Dx:   Encounter Diagnosis     ICD-10-CM    1. Cervicalgia  M54.2       2. Cervical radiculopathy  M54.12       3. Strain of calf muscle, right, initial encounter  S86.811A                      Subjective: Pt offers no new complaints. Objective: See treatment diary below      Assessment: Tolerated treatment well. Unable to find a manual treatment technique that reduced symptoms. Distraction continues to reduce symptoms, and structural differentiation continues to demonstrate improvement with this. Trialed mechanical traction. By the end, pt reported the constant tingling in the thumb/index finger almost resolved. Patient would benefit from continued PT      Plan: Continue per plan of care. Progress treatment as tolerated. Precautions: chronic neck symptoms, post covid migraines, light sensitivity    Therapeutic exercise = (TE)  Therapeutic activity= (TA)  Manual Therapy= (MT)  Neuromuscular re-education- (NRE)  Gait training= (GT)    MT 1. Seated distraction- no improvement  MT 2. Seated distraction with rotation- no improvement and increase tingling in the L arm  MT 3. Supine distraction- 3' reduced  *reduced L arm tingling, as well as L shld pain    :  TE 1. Reassessment- shld 80d prior to onset of buzzing cervical distraction reduces symptoms with shoulder flex, Cervical ext- increase in neck pain, R ROT 63d, L ROT 51d, L SB restricted as well,   MT 2. Supine cervical distraction- 5'  MT 3.  Supine cervical retraction PNF- increased symptoms deferred  *L ROT- 58d  *shld flex- 70d  Mechanical traction- 10' intermittent max 20#, min 10#, speed 50%  *L ROT- 63d  *shld flex- 145d and onset of buzzing

## 2023-12-14 ENCOUNTER — OFFICE VISIT (OUTPATIENT)
Dept: PHYSICAL THERAPY | Facility: CLINIC | Age: 63
End: 2023-12-14
Payer: COMMERCIAL

## 2023-12-14 DIAGNOSIS — M54.2 CERVICALGIA: Primary | ICD-10-CM

## 2023-12-14 DIAGNOSIS — S86.811A STRAIN OF CALF MUSCLE, RIGHT, INITIAL ENCOUNTER: ICD-10-CM

## 2023-12-14 DIAGNOSIS — M54.12 CERVICAL RADICULOPATHY: ICD-10-CM

## 2023-12-14 PROCEDURE — 97110 THERAPEUTIC EXERCISES: CPT | Performed by: PHYSICAL THERAPIST

## 2023-12-14 NOTE — PROGRESS NOTES
Daily Note     Today's date: 2023  Patient name: Prince Stark  : 1960  MRN: [id number]  Referring provider: Marin Whalen MD  Dx:   Encounter Diagnosis     ICD-10-CM    1. Cervicalgia  M54.2       2. Cervical radiculopathy  M54.12       3. Strain of calf muscle, right, initial encounter  S86.298B                      Subjective: Pt reports her tingling is somewhat better. She does state       Objective: See treatment diary below      Assessment: Pt would like to assess the R calf pain. Testing ruled up a muscular involvement of the plantarflexors. Patient would benefit from continued PT      Plan: Continue per plan of care. Progress treatment as tolerated. Precautions: chronic neck symptoms, post covid migraines, light sensitivity    Therapeutic exercise = (TE)  Therapeutic activity= (TA)  Manual Therapy= (MT)  Neuromuscular re-education- (NRE)  Gait training= (GT)    MT 1. Seated distraction- no improvement  MT 2. Seated distraction with rotation- no improvement and increase tingling in the L arm  MT 3. Supine distraction- 3' reduced  *reduced L arm tingling, as well as L shld pain    :  TE 1. Reassessment- shld 80d prior to onset of buzzing cervical distraction reduces symptoms with shoulder flex, Cervical ext- increase in neck pain, R ROT 63d, L ROT 51d, L SB restricted as well,   MT 2. Supine cervical distraction- 5'  MT 3. Supine cervical retraction PNF- increased symptoms deferred  *L ROT- 58d  *shld flex- 70d  Mechanical traction- 10' intermittent max 20#, min 10#, speed 50%  *L ROT- 63d  *shld flex- 145d and onset of buzzing    :   TE 1. Assessment of calf pain- DL heel raise painful, SLR (-), slump (-), gastroc stretch, PROM WNL, MMT WNL  TE 2. Deferred seated heel raise due to pain  TE 3. Ankle PF against GTB- 3x15  *DL heel raise- reduced pain  TE 4. Ankle PF against GTB- 1x15  *DL heel raise- significantly reduced pain  TE 5.  Pt education on progressive loading program for the calf  *L ROT 60d  *no buzzing with shld flex today

## 2023-12-18 ENCOUNTER — OFFICE VISIT (OUTPATIENT)
Dept: PHYSICAL THERAPY | Facility: CLINIC | Age: 63
End: 2023-12-18
Payer: COMMERCIAL

## 2023-12-18 DIAGNOSIS — M54.2 CERVICALGIA: Primary | ICD-10-CM

## 2023-12-18 DIAGNOSIS — S86.811A STRAIN OF CALF MUSCLE, RIGHT, INITIAL ENCOUNTER: ICD-10-CM

## 2023-12-18 DIAGNOSIS — M54.12 CERVICAL RADICULOPATHY: ICD-10-CM

## 2023-12-18 PROCEDURE — 97140 MANUAL THERAPY 1/> REGIONS: CPT | Performed by: PHYSICAL THERAPIST

## 2023-12-18 PROCEDURE — 97112 NEUROMUSCULAR REEDUCATION: CPT | Performed by: PHYSICAL THERAPIST

## 2023-12-18 PROCEDURE — 97110 THERAPEUTIC EXERCISES: CPT | Performed by: PHYSICAL THERAPIST

## 2023-12-18 NOTE — PROGRESS NOTES
Daily Note     Today's date: 2023  Patient name: Allyson Espinoza  : 1960  MRN: [id number]  Referring provider: Cory Trujillo MD  Dx:   Encounter Diagnosis     ICD-10-CM    1. Cervicalgia  M54.2       2. Cervical radiculopathy  M54.12       3. Strain of calf muscle, right, initial encounter  S86.759G                      Subjective: Pt reports she has not been as consistent with HEP for calf. She also reports doing a lot of work on Saturday and her buzzing in her L arm returned.       Objective: See treatment diary below      Assessment: Tolerated treatment fair. Cervical MT did not seem to change symptoms much today. Initiated nerve sliders to calm numbness/tingling symptoms. Educated on proper technique and what to feel. Will assess at next follow-up. Patient would benefit from continued PT      Plan: Continue per plan of care.  Progress treatment as tolerated.       Precautions: chronic neck symptoms, post covid migraines, light sensitivity    Therapeutic exercise = (TE)  Therapeutic activity= (TA)  Manual Therapy= (MT)  Neuromuscular re-education- (NRE)  Gait training= (GT)    MT 1. Seated distraction- no improvement  MT 2. Seated distraction with rotation- no improvement and increase tingling in the L arm  MT 3. Supine distraction- 3' reduced  *reduced L arm tingling, as well as L shld pain    :  TE 1. Reassessment- shld 80d prior to onset of buzzing cervical distraction reduces symptoms with shoulder flex, Cervical ext- increase in neck pain, R ROT 63d, L ROT 51d, L SB restricted as well,   MT 2. Supine cervical distraction- 5'  MT 3. Supine cervical retraction PNF- increased symptoms deferred  *L ROT- 58d  *shld flex- 70d  Mechanical traction- 10' intermittent max 20#, min 10#, speed 50%  *L ROT- 63d  *shld flex- 145d and onset of buzzing    :   TE 1. Assessment of calf pain- DL heel raise painful, SLR (-), slump (-), gastroc stretch, PROM WNL, MMT WNL  TE 2. Deferred seated heel  raise due to pain  TE 3. Ankle PF against GTB- 3x15  *DL heel raise- reduced pain  TE 4. Ankle PF against GTB- 1x15  *DL heel raise- significantly reduced pain  TE 5. Pt education on progressive loading program for the calf  *L ROT 60d  *no buzzing with shld flex today    12/18:  TE 1. Reassessment- shld 90d prior to onset of buzzing cervical distraction reduces symptoms with shoulder flex, Cervical ext- increase in neck pain, R ROT 63d, L ROT 53d, L SB restricted as well,   MT 2. Supine cervical distraction with intermittent cervical ROT- 10'  *shld flex- 100d  NRE 3. Median nerve slider- 1x15  *shld flex- 110d  NRE 4. Median nerve slider- 1x15  *shld flex- 110d  TE 5. Pt education- neuromobilization, normal positive prognosis of cervical radiculopathy

## 2023-12-19 ENCOUNTER — VBI (OUTPATIENT)
Dept: ADMINISTRATIVE | Facility: OTHER | Age: 63
End: 2023-12-19

## 2023-12-20 ENCOUNTER — OFFICE VISIT (OUTPATIENT)
Dept: PHYSICAL THERAPY | Facility: CLINIC | Age: 63
End: 2023-12-20
Payer: COMMERCIAL

## 2023-12-20 DIAGNOSIS — M54.12 CERVICAL RADICULOPATHY: ICD-10-CM

## 2023-12-20 DIAGNOSIS — M54.2 CERVICALGIA: Primary | ICD-10-CM

## 2023-12-20 DIAGNOSIS — S86.811A STRAIN OF CALF MUSCLE, RIGHT, INITIAL ENCOUNTER: ICD-10-CM

## 2023-12-20 DIAGNOSIS — G43.511 INTRACTABLE PERSISTENT MIGRAINE AURA WITHOUT CEREBRAL INFARCTION AND WITH STATUS MIGRAINOSUS: ICD-10-CM

## 2023-12-20 PROCEDURE — 97110 THERAPEUTIC EXERCISES: CPT | Performed by: PHYSICAL THERAPIST

## 2023-12-20 PROCEDURE — 97140 MANUAL THERAPY 1/> REGIONS: CPT | Performed by: PHYSICAL THERAPIST

## 2023-12-20 NOTE — PROGRESS NOTES
Daily Note     Today's date: 2023  Patient name: Allyson Espinoza  : 1960  MRN: [id number]  Referring provider: Cory Trujillo MD  Dx:   Encounter Diagnosis     ICD-10-CM    1. Cervicalgia  M54.2       2. Cervical radiculopathy  M54.12       3. Strain of calf muscle, right, initial encounter  S86.499V                      Subjective: Pt reports no change in symptoms.      Objective: See treatment diary below      Assessment: Tolerated treatment well. Distraction unable to help with current symptoms. Moved to thoracic spine in order to improve symptoms. Significant hypomobility noted. Able to improve L ROT to the point where no symptoms were aggravated. However, ext still aggravating and not changed by thoracic manual therapy. Updated HEP. Patient would benefit from continued PT      Plan: Continue per plan of care.  Progress treatment as tolerated.       Precautions: chronic neck symptoms, post covid migraines, light sensitivity    Therapeutic exercise = (TE)  Therapeutic activity= (TA)  Manual Therapy= (MT)  Neuromuscular re-education- (NRE)  Gait training= (GT)    MT 1. Seated distraction- no improvement  MT 2. Seated distraction with rotation- no improvement and increase tingling in the L arm  MT 3. Supine distraction- 3' reduced  *reduced L arm tingling, as well as L shld pain    :  TE 1. Reassessment- shld 80d prior to onset of buzzing cervical distraction reduces symptoms with shoulder flex, Cervical ext- increase in neck pain, R ROT 63d, L ROT 51d, L SB restricted as well,   MT 2. Supine cervical distraction- 5'  MT 3. Supine cervical retraction PNF- increased symptoms deferred  *L ROT- 58d  *shld flex- 70d  Mechanical traction- 10' intermittent max 20#, min 10#, speed 50%  *L ROT- 63d  *shld flex- 145d and onset of buzzing    :   TE 1. Assessment of calf pain- DL heel raise painful, SLR (-), slump (-), gastroc stretch, PROM WNL, MMT WNL  TE 2. Deferred seated heel raise due to  pain  TE 3. Ankle PF against GTB- 3x15  *DL heel raise- reduced pain  TE 4. Ankle PF against GTB- 1x15  *DL heel raise- significantly reduced pain  TE 5. Pt education on progressive loading program for the calf  *L ROT 60d  *no buzzing with shld flex today    12/18:  TE 1. Reassessment- shld 90d prior to onset of buzzing cervical distraction reduces symptoms with shoulder flex, Cervical ext- increase in neck pain, R ROT 63d, L ROT 53d, L SB restricted as well,   MT 2. Supine cervical distraction with intermittent cervical ROT- 10'  *shld flex- 100d  NRE 3. Median nerve slider- 1x15  *shld flex- 110d  NRE 4. Median nerve slider- 1x15  *shld flex- 110d  TE 5. Pt education- neuromobilization, normal positive prognosis of cervical radiculopathy    12/20:  TE 1. Reassessment - ext 42d increase symptoms down the arm, L ROT 53 increase in symptoms down the arm  MT 2. Cervical distraction- no change with ext or L ROT  MT 3. PAIVM assessment- hypomobility throughout the upper and mid T-spine  MT 4. Gd mobs- IV T2-6 MIS 15'  *L ROT 60d no sypmtoms  *ext no change  MT 5. Supine thoracic distraction HVLA- MIS 2x  *no change in asterisks  TE 6. Pt education on HEP

## 2023-12-21 RX ORDER — RIMEGEPANT SULFATE 75 MG/75MG
TABLET, ORALLY DISINTEGRATING ORAL
Qty: 16 TABLET | Refills: 5 | Status: SHIPPED | OUTPATIENT
Start: 2023-12-21

## 2023-12-26 ENCOUNTER — OFFICE VISIT (OUTPATIENT)
Dept: URGENT CARE | Facility: CLINIC | Age: 63
End: 2023-12-26
Payer: COMMERCIAL

## 2023-12-26 ENCOUNTER — TELEPHONE (OUTPATIENT)
Dept: FAMILY MEDICINE CLINIC | Facility: CLINIC | Age: 63
End: 2023-12-26

## 2023-12-26 VITALS
TEMPERATURE: 97.4 F | OXYGEN SATURATION: 97 % | HEART RATE: 99 BPM | SYSTOLIC BLOOD PRESSURE: 136 MMHG | DIASTOLIC BLOOD PRESSURE: 63 MMHG

## 2023-12-26 DIAGNOSIS — Z20.818 EXPOSURE TO PERTUSSIS: ICD-10-CM

## 2023-12-26 DIAGNOSIS — R05.1 ACUTE COUGH: Primary | ICD-10-CM

## 2023-12-26 PROCEDURE — 87801 DETECT AGNT MULT DNA AMPLI: CPT

## 2023-12-26 PROCEDURE — 99213 OFFICE O/P EST LOW 20 MIN: CPT

## 2023-12-26 PROCEDURE — 87636 SARSCOV2 & INF A&B AMP PRB: CPT

## 2023-12-26 RX ORDER — PREDNISONE 20 MG/1
20 TABLET ORAL DAILY
Qty: 10 TABLET | Refills: 0 | Status: SHIPPED | OUTPATIENT
Start: 2023-12-26 | End: 2024-01-05

## 2023-12-26 RX ORDER — DIPHENHYDRAMINE HCL 25 MG
25 TABLET ORAL EVERY 6 HOURS PRN
Qty: 30 TABLET | Refills: 0 | Status: SHIPPED | OUTPATIENT
Start: 2023-12-26

## 2023-12-26 RX ORDER — AZITHROMYCIN 250 MG/1
TABLET, FILM COATED ORAL
Qty: 6 TABLET | Refills: 0 | Status: SHIPPED | OUTPATIENT
Start: 2023-12-26 | End: 2023-12-30

## 2023-12-26 RX ORDER — FAMOTIDINE 20 MG/1
20 TABLET, FILM COATED ORAL 2 TIMES DAILY
Qty: 20 TABLET | Refills: 0 | Status: SHIPPED | OUTPATIENT
Start: 2023-12-26 | End: 2024-01-05
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
## 2023-12-26 NOTE — TELEPHONE ENCOUNTER
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     Pt reports she's not feeling well, has sinuses, very stuffy head, chest pain, sob and non-productive cough. Pt declines all other s/s. Notes she home Tested Negative for Covid (12/25/2023), Notes it started several days ago x but yesterday it got worse.     Pt advised a message will be sent to  for advisements.      Pt requested medical advise only - asking if she should get tested for pertussis and if yes - where ? Pt aware  will be back on 12/28/2023.     Pt aware she may need an in office visit to be evaluated for her s/s, also aware [id number] has no openings left for the rest of the day - pt aware  If she cannot see a provider -  she can schedule a follow up appointment at a [facility] / Urgent Care/ or to go to the emergency department if she develops  any new or worsening symptoms including shortness of breath, chest pain, or anything else that is concerning.        Please advise

## 2023-12-26 NOTE — PATIENT INSTRUCTIONS
"\"Whooping cough\"    Typically you have 3 stages of illness:  Stage 1 (Catarrhal):   1-2 weeks with URI symptoms such as runny nose, cough, watery eyes, low grade fever  *You are most infectious during this stage*  Stage 2 (Paroxysmal)  1-10 weeks  Episodes of repetitive, forceful cough followed by sudden and massive inspiratory \"whoop\"  In YOUNG infants: it can looks like choking spells, apnea, bradycardia, cyanosis and they can become unresponsive  (Conjunctival hemorrhages, petechiae over head/neck, +/- rhonchi and rales)  Stage 3 (Convalescent)  1-2 weeks, symptoms gradually stop    Take antibiotics as prescribed.   Take entire course of antibiotics.     Eat yogurt with live and active cultures and/or take a probiotic at least 3 hours before or after antibiotic dose.   Monitor stool for diarrhea and/or blood. If this occurs, contact primary care doctor ASAP.     Isolate for the next 5 days    Everyone who has had close contact with you including people in your household should be treated with abx for Pertussis.     Follow up with PCP in 3-5 days.  Proceed to ER if symptoms worsen.  "

## 2023-12-27 ENCOUNTER — TELEPHONE (OUTPATIENT)
Dept: URGENT CARE | Facility: MEDICAL CENTER | Age: 63
End: 2023-12-27

## 2023-12-27 ENCOUNTER — APPOINTMENT (OUTPATIENT)
Dept: PHYSICAL THERAPY | Facility: CLINIC | Age: 63
End: 2023-12-27
Payer: COMMERCIAL

## 2023-12-27 ENCOUNTER — TELEPHONE (OUTPATIENT)
Dept: URGENT CARE | Facility: CLINIC | Age: 63
End: 2023-12-27

## 2023-12-27 LAB
FLUAV RNA RESP QL NAA+PROBE: NEGATIVE
FLUBV RNA RESP QL NAA+PROBE: NEGATIVE
SARS-COV-2 RNA RESP QL NAA+PROBE: POSITIVE

## 2023-12-27 NOTE — TELEPHONE ENCOUNTER
Pt called regarding test results. Her COVID test is positive. Pt has not taking zpack medications due to concern for reaction. Given known + COVID, symptoms with congestion, cough, sinus pressure more consistent with COVID over pertussis okay to continue holding. Still awaiting final pertussis test results. No additional questions.

## 2023-12-28 NOTE — TELEPHONE ENCOUNTER
Called pt to discuss PCR results. As per pt's notes, pt spoke with UGO Henry earlier today and is aware that she is COVID positive. Left message for patient that if she has any other concerns or complaints she can reach this provider tomorrow at [facility] in [place] at [phone].    IOP therapist completed and faxed Pt's [employer] For Duty Certification - Return To Work form.

## 2023-12-29 ENCOUNTER — APPOINTMENT (OUTPATIENT)
Dept: PHYSICAL THERAPY | Facility: CLINIC | Age: 63
End: 2023-12-29
Payer: COMMERCIAL

## 2023-12-29 LAB
B PARAPERT DNA SPEC QL NAA+PROBE: NOT DETECTED
B PERT DNA SPEC QL NAA+PROBE: NOT DETECTED

## 2024-01-02 ENCOUNTER — OFFICE VISIT (OUTPATIENT)
Dept: PHYSICAL THERAPY | Facility: CLINIC | Age: 64
End: 2024-01-02
Payer: COMMERCIAL

## 2024-01-02 DIAGNOSIS — M54.2 CERVICALGIA: Primary | ICD-10-CM

## 2024-01-02 DIAGNOSIS — M54.12 CERVICAL RADICULOPATHY: ICD-10-CM

## 2024-01-02 DIAGNOSIS — S86.811A STRAIN OF CALF MUSCLE, RIGHT, INITIAL ENCOUNTER: ICD-10-CM

## 2024-01-02 PROCEDURE — 97140 MANUAL THERAPY 1/> REGIONS: CPT | Performed by: PHYSICAL THERAPIST

## 2024-01-02 PROCEDURE — 97110 THERAPEUTIC EXERCISES: CPT | Performed by: PHYSICAL THERAPIST

## 2024-01-02 NOTE — PROGRESS NOTES
Daily Note     Today's date: 2024  Patient name: Allyson Espinoza  : 1960  MRN: [id number]  Referring provider: Cory Trujillo MD  Dx:   Encounter Diagnosis     ICD-10-CM    1. Cervicalgia  M54.2       2. Cervical radiculopathy  M54.12       3. Strain of calf muscle, right, initial encounter  S86.325G                      Subjective: Pt reports that her symptoms are improved compared to last      Objective: See treatment diary below      Assessment: Tolerated treatment well. Following thoracic spine mobs, pt shoulder flex ROM improved to full ROM without buzzing. Followed up with teaching HEP again, slight decrease in ROM prior to onset of buzzing. Discussed HEP. Patient would benefit from continued PT      Plan: Continue per plan of care.  Progress treatment as tolerated.       Precautions: chronic neck symptoms, post covid migraines, light sensitivity    Therapeutic exercise = (TE)  Therapeutic activity= (TA)  Manual Therapy= (MT)  Neuromuscular re-education- (NRE)  Gait training= (GT)    MT 1. Seated distraction- no improvement  MT 2. Seated distraction with rotation- no improvement and increase tingling in the L arm  MT 3. Supine distraction- 3' reduced  *reduced L arm tingling, as well as L shld pain    :  TE 1. Reassessment- shld 80d prior to onset of buzzing cervical distraction reduces symptoms with shoulder flex, Cervical ext- increase in neck pain, R ROT 63d, L ROT 51d, L SB restricted as well,   MT 2. Supine cervical distraction- 5'  MT 3. Supine cervical retraction PNF- increased symptoms deferred  *L ROT- 58d  *shld flex- 70d  Mechanical traction- 10' intermittent max 20#, min 10#, speed 50%  *L ROT- 63d  *shld flex- 145d and onset of buzzing    :   TE 1. Assessment of calf pain- DL heel raise painful, SLR (-), slump (-), gastroc stretch, PROM WNL, MMT WNL  TE 2. Deferred seated heel raise due to pain  TE 3. Ankle PF against GTB- 3x15  *DL heel raise- reduced pain  TE 4. Ankle PF  against GTB- 1x15  *DL heel raise- significantly reduced pain  TE 5. Pt education on progressive loading program for the calf  *L ROT 60d  *no buzzing with shld flex today    12/18:  TE 1. Reassessment- shld 90d prior to onset of buzzing cervical distraction reduces symptoms with shoulder flex, Cervical ext- increase in neck pain, R ROT 63d, L ROT 53d, L SB restricted as well,   MT 2. Supine cervical distraction with intermittent cervical ROT- 10'  *shld flex- 100d  NRE 3. Median nerve slider- 1x15  *shld flex- 110d  NRE 4. Median nerve slider- 1x15  *shld flex- 110d  TE 5. Pt education- neuromobilization, normal positive prognosis of cervical radiculopathy    12/20:  TE 1. Reassessment - ext 42d increase symptoms down the arm, L ROT 53 increase in symptoms down the arm  MT 2. Cervical distraction- no change with ext or L ROT  MT 3. PAIVM assessment- hypomobility throughout the upper and mid T-spine  MT 4. Gd mobs- IV T2-6 MIS 15'  *L ROT 60d no sypmtoms  *ext no change  MT 5. Supine thoracic distraction HVLA- MIS 2x  *no change in asterisks  TE 6. Pt education on HEP     12/20:  TE 1. Reassessment - Shld Flex 115d, L ROT 57 increase in symptoms down the arm  MT 2. PAIVM assessment- hypomobility throughout the upper and mid T-spine  MT 4. Gd mobs L to R transverse- T2-3 MIS 10' gd 2-3  *L ROT 61d no sypmtoms  *flex- WNL  MT 5. Supine thoracic ext over towel- 10x  *L ROT- 64d  *flex- WNL  TE 6. Pt education on HEP

## 2024-01-03 ENCOUNTER — APPOINTMENT (OUTPATIENT)
Dept: PHYSICAL THERAPY | Facility: CLINIC | Age: 64
End: 2024-01-03
Payer: COMMERCIAL

## 2024-01-08 ENCOUNTER — APPOINTMENT (OUTPATIENT)
Dept: PHYSICAL THERAPY | Facility: CLINIC | Age: 64
End: 2024-01-08
Payer: COMMERCIAL

## 2024-01-10 ENCOUNTER — OFFICE VISIT (OUTPATIENT)
Dept: PHYSICAL THERAPY | Facility: CLINIC | Age: 64
End: 2024-01-10
Payer: COMMERCIAL

## 2024-01-10 DIAGNOSIS — M54.12 CERVICAL RADICULOPATHY: ICD-10-CM

## 2024-01-10 DIAGNOSIS — S86.811A STRAIN OF CALF MUSCLE, RIGHT, INITIAL ENCOUNTER: ICD-10-CM

## 2024-01-10 DIAGNOSIS — M54.2 CERVICALGIA: Primary | ICD-10-CM

## 2024-01-10 PROCEDURE — 97140 MANUAL THERAPY 1/> REGIONS: CPT | Performed by: PHYSICAL THERAPIST

## 2024-01-10 PROCEDURE — 97110 THERAPEUTIC EXERCISES: CPT | Performed by: PHYSICAL THERAPIST

## 2024-01-10 NOTE — PROGRESS NOTES
Daily Note     Today's date: 1/10/2024  Patient name: Allyson Espinoza  : 1960  MRN: [id number]  Referring provider: Cory Trujillo MD  Dx:   Encounter Diagnosis     ICD-10-CM    1. Cervicalgia  M54.2       2. Cervical radiculopathy  M54.12       3. Strain of calf muscle, right, initial encounter  S86.869U                      Subjective: Patient states symptoms have improved since beginning PT treatment.       Objective: See treatment diary below      Assessment: Patient demonstrated improved left C/S rotation ROM after completion of towel SNAGs and stated some decrease in radicular symptoms in left hand; demonstrated improved T/S mobility after completion of T/S mobilizations; patient advised to add towel SNAGs for C/S rotation to HEP.       Plan: Continue per plan of care.  Progress treatment as tolerated.   Assess response to towel SNAGs in HEP.       Precautions: chronic neck symptoms, post covid migraines, light sensitivity    Therapeutic exercise = (TE)  Therapeutic activity= (TA)  Manual Therapy= (MT)  Neuromuscular re-education- (NRE)  Gait training= (GT)    MT 1. Seated distraction- no improvement  MT 2. Seated distraction with rotation- no improvement and increase tingling in the L arm  MT 3. Supine distraction- 3' reduced  *reduced L arm tingling, as well as L shld pain    :  TE 1. Reassessment- shld 80d prior to onset of buzzing cervical distraction reduces symptoms with shoulder flex, Cervical ext- increase in neck pain, R ROT 63d, L ROT 51d, L SB restricted as well,   MT 2. Supine cervical distraction- 5'  MT 3. Supine cervical retraction PNF- increased symptoms deferred  *L ROT- 58d  *shld flex- 70d  Mechanical traction- 10' intermittent max 20#, min 10#, speed 50%  *L ROT- 63d  *shld flex- 145d and onset of buzzing    :   TE 1. Assessment of calf pain- DL heel raise painful, SLR (-), slump (-), gastroc stretch, PROM WNL, MMT WNL  TE 2. Deferred seated heel raise due to pain  TE 3.  "Ankle PF against GTB- 3x15  *DL heel raise- reduced pain  TE 4. Ankle PF against GTB- 1x15  *DL heel raise- significantly reduced pain  TE 5. Pt education on progressive loading program for the calf  *L ROT 60d  *no buzzing with shld flex today    12/18:  TE 1. Reassessment- shld 90d prior to onset of buzzing cervical distraction reduces symptoms with shoulder flex, Cervical ext- increase in neck pain, R ROT 63d, L ROT 53d, L SB restricted as well,   MT 2. Supine cervical distraction with intermittent cervical ROT- 10'  *shld flex- 100d  NRE 3. Median nerve slider- 1x15  *shld flex- 110d  NRE 4. Median nerve slider- 1x15  *shld flex- 110d  TE 5. Pt education- neuromobilization, normal positive prognosis of cervical radiculopathy    12/20:  TE 1. Reassessment - ext 42d increase symptoms down the arm, L ROT 53 increase in symptoms down the arm  MT 2. Cervical distraction- no change with ext or L ROT  MT 3. PAIVM assessment- hypomobility throughout the upper and mid T-spine  MT 4. Gd mobs- IV T2-6 MIS 15'  *L ROT 60d no sypmtoms  *ext no change  MT 5. Supine thoracic distraction HVLA- MIS 2x  *no change in asterisks  TE 6. Pt education on HEP     1/2:  TE 1. Reassessment - Shld Flex 115d, L ROT 57 increase in symptoms down the arm  MT 2. PAIVM assessment- hypomobility throughout the upper and mid T-spine  MT 4. Gd mobs L to R transverse- T2-3 MIS 10' gd 2-3  *L ROT 61d no sypmtoms  *flex- WNL  MT 5. Supine thoracic ext over towel- 10x  *L ROT- 64d  *flex- WNL  TE 6. Pt education on HEP     1/10:  MT 1.  T/S P/A mobilizations - gr IV, gr V  MT 2. C/S lateral glides in supine - gr III-IV  TE 1.  C/S SNAGs for left C/S rotation with towel - 20x5\"  TE 2. Sitting T/S extension with half foam roll - 10x  TE 3. Bilateral shoulder ER with scap ret w/TB - GTB 2x10       "

## 2024-01-22 ENCOUNTER — OFFICE VISIT (OUTPATIENT)
Dept: PHYSICAL THERAPY | Facility: CLINIC | Age: 64
End: 2024-01-22
Payer: COMMERCIAL

## 2024-01-22 DIAGNOSIS — S86.811A STRAIN OF CALF MUSCLE, RIGHT, INITIAL ENCOUNTER: ICD-10-CM

## 2024-01-22 DIAGNOSIS — M54.12 CERVICAL RADICULOPATHY: ICD-10-CM

## 2024-01-22 DIAGNOSIS — M54.2 CERVICALGIA: Primary | ICD-10-CM

## 2024-01-22 PROCEDURE — 97110 THERAPEUTIC EXERCISES: CPT | Performed by: PHYSICAL THERAPIST

## 2024-01-22 PROCEDURE — 97140 MANUAL THERAPY 1/> REGIONS: CPT | Performed by: PHYSICAL THERAPIST

## 2024-01-22 NOTE — PROGRESS NOTES
PT Re-evaluation     Today's date: 2023  Patient name: Allyson Espinoza  : 1960  MRN: [id number]  Referring provider: Cory Trujillo MD  Dx:   Encounter Diagnosis     ICD-10-CM    1. Cervicalgia  M54.2 Ambulatory Referral to Physical Therapy      2. Cervical radiculopathy  M54.12 Ambulatory Referral to Physical Therapy      3. Strain of calf muscle, right, initial encounter  S86.958Y Ambulatory Referral to Physical Therapy                     Assessment  Assessment details: Pt is a 64 y/o female who presents to physical therapy with primary peripheral neuropathic pain associated with L cervical radiculopathy in the environment of significantly reduced cervical ROM complicated by post covid migraines. Pt has made good improvement in her L arm symptoms, self reporting 50-60% improvement and demonstrating significantly improved shoulder and cervical motion without recreation of symptoms. Did assess her calf further today, unable to fully recreate her symptoms but noted decreased lumbar joint mobility at the L4-S1 segments on the R side, as well as decreased neurodynamics on the L side. Pt would continue to benefit from skilled physical therapy in order to decrease deficits and return to prior level of function.    Impairments: abnormal or restricted ROM, activity intolerance, impaired physical strength and pain with function  Understanding of Dx/Px/POC: good  Goals  STG (4 weeks):  Pt will be independent with HEP. - MET  Pt will demonstrate increase in cervical rotation ROM by 10d to the L. - MET    LTG (8 weeks): - Ongoing  FOTO will be expected outcome.   Pt will demonstrate cervical ROT ROM comparable to contralateral side.  Pt will demonstrate normal jt mobility compared to contralateral side.  Pt will demonstrate negative neuromechanical sensitivity testing.      Plan  Patient would benefit from: skilled physical therapy  Planned modality interventions: cryotherapy  Planned therapy interventions:  manual therapy, neuromuscular re-education, patient education, self care, strengthening, stretching, therapeutic activities, therapeutic exercise and home exercise program  Frequency: 1-2x/week.  Duration in weeks: 8  Treatment plan discussed with: patient    Subjective Evaluation    History of Present Illness  Mechanism of injury: Chief Complaint: Pt reports that beginning in October where she woke up with neck stiffness. She states that 2 days later she began to have shld pain. She states that it progressively worsened until she was given a dose pack of prednisone which helped relieve some of the pain. She reports acupuncture and chiro help minimally.     (1/22): Pt reports 60-70% improved since day one. She states that the burning is less consistent in the L arm and the numbness/tingling is much less. She states that following last session, the next morning she woke up with a serious headache. She states that this has not calmed down since. She also reports no change in calf at this point.     Severity: high  Irritability: moderate  Nature: peripheral neuropathic  Stage: chronic  Stability: improving    P1: see body chart    Occupation:   Patient Goals  Patient goal: get rid of tingling in the arm    Objective     Neurological Testing     Reflexes   Left   Biceps (C5/C6): normal (2+)  Brachioradialis (C6): normal (2+)  Triceps (C7): normal (2+)    Right   Biceps (C5/C6): normal (2+)  Brachioradialis (C6): normal (2+)  Triceps (C7): normal (2+)    Additional Neurological Details  Dermatome- dysesthesia in the C6 dermatome  Myotome- reduced tricep strength on the L  (+) SLR b/l    Active Range of Motion     Additional Active Range of Motion Details  FB: WNL  BB: 50% with immediate pain into the L shoulder and increased buzzing in the L arm  R ROT: 70d  L ROT: 65d block, no increase in symptoms  Retraction: significantly limited and increase in L shoulder and arm symptoms    Jt  mobility:  Decreased R UPA L4-S1         Precautions: chronic neck symptoms, post covid migraines, light sensitivity    Therapeutic exercise = (TE)  Therapeutic activity= (TA)  Manual Therapy= (MT)  Neuromuscular re-education- (NRE)  Gait training= (GT)    12/14:   TE 1. Assessment of calf pain- DL heel raise painful, SLR (-), slump (-), gastroc stretch, PROM WNL, MMT WNL  TE 2. Deferred seated heel raise due to pain  TE 3. Ankle PF against GTB- 3x15  *DL heel raise- reduced pain  TE 4. Ankle PF against GTB- 1x15  *DL heel raise- significantly reduced pain  TE 5. Pt education on progressive loading program for the calf  *L ROT 60d  *no buzzing with shld flex today    12/18:  TE 1. Reassessment- shld 90d prior to onset of buzzing cervical distraction reduces symptoms with shoulder flex, Cervical ext- increase in neck pain, R ROT 63d, L ROT 53d, L SB restricted as well,   MT 2. Supine cervical distraction with intermittent cervical ROT- 10'  *shld flex- 100d  NRE 3. Median nerve slider- 1x15  *shld flex- 110d  NRE 4. Median nerve slider- 1x15  *shld flex- 110d  TE 5. Pt education- neuromobilization, normal positive prognosis of cervical radiculopathy    12/20:  TE 1. Reassessment - ext 42d increase symptoms down the arm, L ROT 53 increase in symptoms down the arm  MT 2. Cervical distraction- no change with ext or L ROT  MT 3. PAIVM assessment- hypomobility throughout the upper and mid T-spine  MT 4. Gd mobs- IV T2-6 MIS 15'  *L ROT 60d no sypmtoms  *ext no change  MT 5. Supine thoracic distraction HVLA- MIS 2x  *no change in asterisks  TE 6. Pt education on HEP     1/2:  TE 1. Reassessment - Shld Flex 115d, L ROT 57 increase in symptoms down the arm  MT 2. PAIVM assessment- hypomobility throughout the upper and mid T-spine  MT 4. Gd mobs L to R transverse- T2-3 MIS 10' gd 2-3  *L ROT 61d no sypmtoms  *flex- WNL  MT 5. Supine thoracic ext over towel- 10x  *L ROT- 64d  *flex- WNL  TE 6. Pt education on HEP     1/10:  MT 1.  " T/S P/A mobilizations - gr IV, gr V  MT 2. C/S lateral glides in supine - gr III-IV  TE 1.  C/S SNAGs for left C/S rotation with towel - 20x5\"  TE 2. Sitting T/S extension with half foam roll - 10x  TE 3. Bilateral shoulder ER with scap ret w/TB - GTB 2x10    1/22:  TE 1. Reassessment  MT 2. R UPA L4-S1 MIS gd 2-4  NRE 3. Supine sciatic slider- 10x (HEP 3x/day 1')    NV: R UPA/CPA lumbar spine, assess prone press-ups, sciatic sliders, calf strengthening       "

## 2024-01-23 DIAGNOSIS — G43.511 INTRACTABLE PERSISTENT MIGRAINE AURA WITHOUT CEREBRAL INFARCTION AND WITH STATUS MIGRAINOSUS: ICD-10-CM

## 2024-01-23 RX ORDER — NALTREXONE HYDROCHLORIDE 50 MG/1
50 TABLET, FILM COATED ORAL DAILY
Qty: 30 TABLET | Refills: 5 | Status: SHIPPED | OUTPATIENT
Start: 2024-01-23

## 2024-01-24 ENCOUNTER — OFFICE VISIT (OUTPATIENT)
Dept: PHYSICAL THERAPY | Facility: CLINIC | Age: 64
End: 2024-01-24
Payer: COMMERCIAL

## 2024-01-24 DIAGNOSIS — M54.2 CERVICALGIA: Primary | ICD-10-CM

## 2024-01-24 DIAGNOSIS — M54.12 CERVICAL RADICULOPATHY: ICD-10-CM

## 2024-01-24 DIAGNOSIS — S86.811A STRAIN OF CALF MUSCLE, RIGHT, INITIAL ENCOUNTER: ICD-10-CM

## 2024-01-24 PROCEDURE — 97112 NEUROMUSCULAR REEDUCATION: CPT | Performed by: PHYSICAL THERAPIST

## 2024-01-24 PROCEDURE — 97110 THERAPEUTIC EXERCISES: CPT | Performed by: PHYSICAL THERAPIST

## 2024-01-24 NOTE — PROGRESS NOTES
Daily Note     Today's date: 2024  Patient name: Allyson Espinoza  : 1960  MRN: [id number]  Referring provider: Cory Trujillo MD  Dx:   Encounter Diagnosis     ICD-10-CM    1. Cervicalgia  M54.2       2. Cervical radiculopathy  M54.12       3. Strain of calf muscle, right, initial encounter  S86.349J                      Subjective: Patient c/o HA prior to treatment session; stated minimal pain in Gastroc area prior to treatment session.       Objective: See treatment diary below      Assessment: Patient stated no significant change in Gastroc symptoms with prone press ups or L/S P/A mobilizations; patient stated decreased pain and tightness in Gastroc after completion of Wall Gastroc and Soleus stretches.       Plan: Continue per plan of care.      Precautions: chronic neck symptoms, post covid migraines, light sensitivity    Therapeutic exercise = (TE)  Therapeutic activity= (TA)  Manual Therapy= (MT)  Neuromuscular re-education- (NRE)  Gait training= (GT)    MT 1. Seated distraction- no improvement  MT 2. Seated distraction with rotation- no improvement and increase tingling in the L arm  MT 3. Supine distraction- 3' reduced  *reduced L arm tingling, as well as L shld pain    :  TE 1. Reassessment- shld 80d prior to onset of buzzing cervical distraction reduces symptoms with shoulder flex, Cervical ext- increase in neck pain, R ROT 63d, L ROT 51d, L SB restricted as well,   MT 2. Supine cervical distraction- 5'  MT 3. Supine cervical retraction PNF- increased symptoms deferred  *L ROT- 58d  *shld flex- 70d  Mechanical traction- 10' intermittent max 20#, min 10#, speed 50%  *L ROT- 63d  *shld flex- 145d and onset of buzzing    :   TE 1. Assessment of calf pain- DL heel raise painful, SLR (-), slump (-), gastroc stretch, PROM WNL, MMT WNL  TE 2. Deferred seated heel raise due to pain  TE 3. Ankle PF against GTB- 3x15  *DL heel raise- reduced pain  TE 4. Ankle PF against GTB- 1x15  *DL heel  "raise- significantly reduced pain  TE 5. Pt education on progressive loading program for the calf  *L ROT 60d  *no buzzing with shld flex today    12/18:  TE 1. Reassessment- shld 90d prior to onset of buzzing cervical distraction reduces symptoms with shoulder flex, Cervical ext- increase in neck pain, R ROT 63d, L ROT 53d, L SB restricted as well,   MT 2. Supine cervical distraction with intermittent cervical ROT- 10'  *shld flex- 100d  NRE 3. Median nerve slider- 1x15  *shld flex- 110d  NRE 4. Median nerve slider- 1x15  *shld flex- 110d  TE 5. Pt education- neuromobilization, normal positive prognosis of cervical radiculopathy    12/20:  TE 1. Reassessment - ext 42d increase symptoms down the arm, L ROT 53 increase in symptoms down the arm  MT 2. Cervical distraction- no change with ext or L ROT  MT 3. PAIVM assessment- hypomobility throughout the upper and mid T-spine  MT 4. Gd mobs- IV T2-6 MIS 15'  *L ROT 60d no sypmtoms  *ext no change  MT 5. Supine thoracic distraction HVLA- MIS 2x  *no change in asterisks  TE 6. Pt education on HEP     1/2:  TE 1. Reassessment - Shld Flex 115d, L ROT 57 increase in symptoms down the arm  MT 2. PAIVM assessment- hypomobility throughout the upper and mid T-spine  MT 4. Gd mobs L to R transverse- T2-3 MIS 10' gd 2-3  *L ROT 61d no sypmtoms  *flex- WNL  MT 5. Supine thoracic ext over towel- 10x  *L ROT- 64d  *flex- WNL  TE 6. Pt education on HEP     1/10:  MT 1.  T/S P/A mobilizations - gr IV, gr V  MT 2. C/S lateral glides in supine - gr III-IV  TE 1.  C/S SNAGs for left C/S rotation with towel - 20x5\"  TE 2. Sitting T/S extension with half foam roll - 10x  TE 3. Bilateral shoulder ER with scap ret w/TB - GTB 2x10      1/22:  TE 1. Reassessment  MT 2. R UPA L4-S1 MIS gd 2-4  NRE 3. Supine sciatic slider- 10x (HEP 3x/day 1')      1/24:  MT 1. L/S P/A grIII-IV   TE 1.  Ankle PF with GTB 2x10  TE 2.  Prone press ups -2x10  TE 3.  SL leg press HR 15# 2x10  TE 4.  Wall Gastroc stretch " "- 3x30\"  TE 5.  Wall Soleus stretch - 3x30\"  TE 6.  Forward step downs 4\" w/ ecc. focus - 2x10  TE 7.  Supine sciatic slider - 20x     "

## 2024-01-28 DIAGNOSIS — N32.81 OAB (OVERACTIVE BLADDER): ICD-10-CM

## 2024-01-29 ENCOUNTER — OFFICE VISIT (OUTPATIENT)
Dept: PHYSICAL THERAPY | Facility: CLINIC | Age: 64
End: 2024-01-29
Payer: COMMERCIAL

## 2024-01-29 DIAGNOSIS — S86.811A STRAIN OF CALF MUSCLE, RIGHT, INITIAL ENCOUNTER: ICD-10-CM

## 2024-01-29 DIAGNOSIS — M54.2 CERVICALGIA: Primary | ICD-10-CM

## 2024-01-29 DIAGNOSIS — M54.12 CERVICAL RADICULOPATHY: ICD-10-CM

## 2024-01-29 PROCEDURE — 97110 THERAPEUTIC EXERCISES: CPT | Performed by: PHYSICAL THERAPIST

## 2024-01-29 PROCEDURE — 97140 MANUAL THERAPY 1/> REGIONS: CPT | Performed by: PHYSICAL THERAPIST

## 2024-01-29 RX ORDER — FESOTERODINE FUMARATE 4 MG/1
1 TABLET, EXTENDED RELEASE ORAL DAILY
Qty: 90 TABLET | Refills: 5 | Status: SHIPPED | OUTPATIENT
Start: 2024-01-29

## 2024-01-29 NOTE — PROGRESS NOTES
Daily Note     Today's date: 2024  Patient name: Allyson Espinoza  : 1960  MRN: [id number]  Referring provider: Cory Trujillo MD  Dx:   Encounter Diagnosis     ICD-10-CM    1. Cervicalgia  M54.2       2. Cervical radiculopathy  M54.12       3. Strain of calf muscle, right, initial encounter  S86.351A                      Subjective: Pt reports no change in symptoms since last visit. She is completing Hep as prescribed.       Objective: See treatment diary below      Assessment: Tolerated treatment fair. SLR (-) today. Still painful ankle DF with knee extended. Well's criteria 0. Manual therapy to the gastroc revealed palpable tightness in the medial gastroc with pain upon palpation. MT loosened tissue and reduced pain to palpation. However, this upon retest of heel raise, pt demonstrated increase in gastroc pain. Discussed that this could reduce within a day. Discussed making sure to continue to assess leg for any swelling/edema until next session. Updated gastroc strengthening. Patient would benefit from continued PT      Plan: Continue per plan of care.  Progress treatment as tolerated.       Precautions: chronic neck symptoms, post covid migraines, light sensitivity    POC expires Unit limit Auth Expiration date PT/OT/ST + Visit Limit?   3/18/24 4 N/a 52                               Therapeutic exercise = (TE)  Therapeutic activity= (TA)  Manual Therapy= (MT)  Neuromuscular re-education- (NRE)  Gait training= (GT)    MT 1. Seated distraction- no improvement  MT 2. Seated distraction with rotation- no improvement and increase tingling in the L arm  MT 3. Supine distraction- 3' reduced  *reduced L arm tingling, as well as L shld pain    :  TE 1. Reassessment- shld 80d prior to onset of buzzing cervical distraction reduces symptoms with shoulder flex, Cervical ext- increase in neck pain, R ROT 63d, L ROT 51d, L SB restricted as well,   MT 2. Supine cervical distraction- 5'  MT 3. Supine cervical  retraction PNF- increased symptoms deferred  *L ROT- 58d  *shld flex- 70d  Mechanical traction- 10' intermittent max 20#, min 10#, speed 50%  *L ROT- 63d  *shld flex- 145d and onset of buzzing    12/14:   TE 1. Assessment of calf pain- DL heel raise painful, SLR (-), slump (-), gastroc stretch, PROM WNL, MMT WNL  TE 2. Deferred seated heel raise due to pain  TE 3. Ankle PF against GTB- 3x15  *DL heel raise- reduced pain  TE 4. Ankle PF against GTB- 1x15  *DL heel raise- significantly reduced pain  TE 5. Pt education on progressive loading program for the calf  *L ROT 60d  *no buzzing with shld flex today    12/18:  TE 1. Reassessment- shld 90d prior to onset of buzzing cervical distraction reduces symptoms with shoulder flex, Cervical ext- increase in neck pain, R ROT 63d, L ROT 53d, L SB restricted as well,   MT 2. Supine cervical distraction with intermittent cervical ROT- 10'  *shld flex- 100d  NRE 3. Median nerve slider- 1x15  *shld flex- 110d  NRE 4. Median nerve slider- 1x15  *shld flex- 110d  TE 5. Pt education- neuromobilization, normal positive prognosis of cervical radiculopathy    12/20:  TE 1. Reassessment - ext 42d increase symptoms down the arm, L ROT 53 increase in symptoms down the arm  MT 2. Cervical distraction- no change with ext or L ROT  MT 3. PAIVM assessment- hypomobility throughout the upper and mid T-spine  MT 4. Gd mobs- IV T2-6 MIS 15'  *L ROT 60d no sypmtoms  *ext no change  MT 5. Supine thoracic distraction HVLA- MIS 2x  *no change in asterisks  TE 6. Pt education on HEP     1/2:  TE 1. Reassessment - Shld Flex 115d, L ROT 57 increase in symptoms down the arm  MT 2. PAIVM assessment- hypomobility throughout the upper and mid T-spine  MT 4. Gd mobs L to R transverse- T2-3 MIS 10' gd 2-3  *L ROT 61d no sypmtoms  *flex- WNL  MT 5. Supine thoracic ext over towel- 10x  *L ROT- 64d  *flex- WNL  TE 6. Pt education on HEP     1/10:  MT 1.  T/S P/A mobilizations - gr IV, gr V  MT 2. C/S lateral  "glides in supine - gr III-IV  TE 1.  C/S SNAGs for left C/S rotation with towel - 20x5\"  TE 2. Sitting T/S extension with half foam roll - 10x  TE 3. Bilateral shoulder ER with scap ret w/TB - GTB 2x10      1/22:  TE 1. Reassessment  MT 2. R UPA L4-S1 MIS gd 2-4  NRE 3. Supine sciatic slider- 10x (HEP 3x/day 1')      1/24:  MT 1. L/S P/A grIII-IV   TE 1.  Ankle PF with GTB 2x10  TE 2.  Prone press ups -2x10  TE 3.  SL leg press HR 15# 2x10  TE 4.  Wall Gastroc stretch - 3x30\"  TE 5.  Wall Soleus stretch - 3x30\"  TE 6.  Forward step downs 4\" w/ ecc. focus - 2x10  TE 7.  Supine sciatic slider - 20x    1/29:  TE 1. Reassessment   *(-) SLR  *DL heel raise- painful at top  *SL heel raise- more painful but able to complete  MT 2. Gastroc effluerage, petrissage, Pin and stretch- MIS 20'  *DL heel raise- more painful on the R than before  TE 3. Seated ankle PF with black TB- 2x20  *Dl heel raise- only tightness  *SL heel raise- more painful than before, had to stop  TE 4. Pt education on HEP, DVT       "

## 2024-01-31 ENCOUNTER — OFFICE VISIT (OUTPATIENT)
Dept: PHYSICAL THERAPY | Facility: CLINIC | Age: 64
End: 2024-01-31
Payer: COMMERCIAL

## 2024-01-31 DIAGNOSIS — S86.811A STRAIN OF CALF MUSCLE, RIGHT, INITIAL ENCOUNTER: ICD-10-CM

## 2024-01-31 DIAGNOSIS — M54.12 CERVICAL RADICULOPATHY: ICD-10-CM

## 2024-01-31 DIAGNOSIS — M54.2 CERVICALGIA: Primary | ICD-10-CM

## 2024-01-31 PROCEDURE — 97140 MANUAL THERAPY 1/> REGIONS: CPT | Performed by: PHYSICAL THERAPIST

## 2024-01-31 PROCEDURE — 97110 THERAPEUTIC EXERCISES: CPT | Performed by: PHYSICAL THERAPIST

## 2024-01-31 NOTE — PROGRESS NOTES
Daily Note     Today's date: 2024  Patient name: Allyson Espinoza  : 1960  MRN: [id number]  Referring provider: Cory Trujillo MD  Dx:   Encounter Diagnosis     ICD-10-CM    1. Cervicalgia  M54.2       2. Cervical radiculopathy  M54.12       3. Strain of calf muscle, right, initial encounter  S86.523L                      Subjective: Patient states continued moderate soreness in Gastroc.       Objective: See treatment diary below      Assessment: Patient demonstrated moderate difficulty with SL heel raises on leg press; stated moderate muscle fatigue at completion of treatment session.       Plan: Continue per plan of care.  Progress treatment as tolerated.       Precautions: chronic neck symptoms, post covid migraines, light sensitivity    POC expires Unit limit Auth Expiration date PT/OT/ST + Visit Limit?   3/18/24 4 N/a 52                               Therapeutic exercise = (TE)  Therapeutic activity= (TA)  Manual Therapy= (MT)  Neuromuscular re-education- (NRE)  Gait training= (GT)    MT 1. Seated distraction- no improvement  MT 2. Seated distraction with rotation- no improvement and increase tingling in the L arm  MT 3. Supine distraction- 3' reduced  *reduced L arm tingling, as well as L shld pain    :  TE 1. Reassessment- shld 80d prior to onset of buzzing cervical distraction reduces symptoms with shoulder flex, Cervical ext- increase in neck pain, R ROT 63d, L ROT 51d, L SB restricted as well,   MT 2. Supine cervical distraction- 5'  MT 3. Supine cervical retraction PNF- increased symptoms deferred  *L ROT- 58d  *shld flex- 70d  Mechanical traction- 10' intermittent max 20#, min 10#, speed 50%  *L ROT- 63d  *shld flex- 145d and onset of buzzing    :   TE 1. Assessment of calf pain- DL heel raise painful, SLR (-), slump (-), gastroc stretch, PROM WNL, MMT WNL  TE 2. Deferred seated heel raise due to pain  TE 3. Ankle PF against GTB- 3x15  *DL heel raise- reduced pain  TE 4. Ankle PF  "against GTB- 1x15  *DL heel raise- significantly reduced pain  TE 5. Pt education on progressive loading program for the calf  *L ROT 60d  *no buzzing with shld flex today    12/18:  TE 1. Reassessment- shld 90d prior to onset of buzzing cervical distraction reduces symptoms with shoulder flex, Cervical ext- increase in neck pain, R ROT 63d, L ROT 53d, L SB restricted as well,   MT 2. Supine cervical distraction with intermittent cervical ROT- 10'  *shld flex- 100d  NRE 3. Median nerve slider- 1x15  *shld flex- 110d  NRE 4. Median nerve slider- 1x15  *shld flex- 110d  TE 5. Pt education- neuromobilization, normal positive prognosis of cervical radiculopathy    12/20:  TE 1. Reassessment - ext 42d increase symptoms down the arm, L ROT 53 increase in symptoms down the arm  MT 2. Cervical distraction- no change with ext or L ROT  MT 3. PAIVM assessment- hypomobility throughout the upper and mid T-spine  MT 4. Gd mobs- IV T2-6 MIS 15'  *L ROT 60d no sypmtoms  *ext no change  MT 5. Supine thoracic distraction HVLA- MIS 2x  *no change in asterisks  TE 6. Pt education on HEP     1/2:  TE 1. Reassessment - Shld Flex 115d, L ROT 57 increase in symptoms down the arm  MT 2. PAIVM assessment- hypomobility throughout the upper and mid T-spine  MT 4. Gd mobs L to R transverse- T2-3 MIS 10' gd 2-3  *L ROT 61d no sypmtoms  *flex- WNL  MT 5. Supine thoracic ext over towel- 10x  *L ROT- 64d  *flex- WNL  TE 6. Pt education on HEP     1/10:  MT 1.  T/S P/A mobilizations - gr IV, gr V  MT 2. C/S lateral glides in supine - gr III-IV  TE 1.  C/S SNAGs for left C/S rotation with towel - 20x5\"  TE 2. Sitting T/S extension with half foam roll - 10x  TE 3. Bilateral shoulder ER with scap ret w/TB - GTB 2x10      1/22:  TE 1. Reassessment  MT 2. R UPA L4-S1 MIS gd 2-4  NRE 3. Supine sciatic slider- 10x (HEP 3x/day 1')      1/24:  MT 1. L/S P/A grIII-IV   TE 1.  Ankle PF with GTB 2x10  TE 2.  Prone press ups -2x10  TE 3.  SL leg press HR 15# " "2x10  TE 4.  Wall Gastroc stretch - 3x30\"  TE 5.  Wall Soleus stretch - 3x30\"  TE 6.  Forward step downs 4\" w/ ecc. focus - 2x10  TE 7.  Supine sciatic slider - 20x    1/29:  TE 1. Reassessment   *(-) SLR  *DL heel raise- painful at top  *SL heel raise- more painful but able to complete  MT 2. Gastroc effluerage, petrissage, Pin and stretch- MIS 20'  *DL heel raise- more painful on the R than before  TE 3. Seated ankle PF with black TB- 2x20  *Dl heel raise- only tightness  *SL heel raise- more painful than before, had to stop  TE 4. Pt education on HEP, DVT    1/31  MT 1. IASTM Gastroc - KK 10'  TE 1.  Long sitting ankle PF with black TB- 2x20  TE 2.  Bilateral heel raise - 3x10  TE 3.  Standing Gastroc stretch - 4x30\"  TE 4.  Standing Soleus stretch - 4x30\"  TE 5   SL leg press HR - 15# - 3x10             "

## 2024-02-07 ENCOUNTER — APPOINTMENT (OUTPATIENT)
Dept: PHYSICAL THERAPY | Facility: CLINIC | Age: 64
End: 2024-02-07
Payer: COMMERCIAL

## 2024-02-11 DIAGNOSIS — J30.1 CHRONIC SEASONAL ALLERGIC RHINITIS DUE TO POLLEN: ICD-10-CM

## 2024-02-12 ENCOUNTER — OFFICE VISIT (OUTPATIENT)
Dept: PHYSICAL THERAPY | Facility: CLINIC | Age: 64
End: 2024-02-12
Payer: COMMERCIAL

## 2024-02-12 DIAGNOSIS — S86.811A STRAIN OF CALF MUSCLE, RIGHT, INITIAL ENCOUNTER: ICD-10-CM

## 2024-02-12 DIAGNOSIS — M54.2 CERVICALGIA: Primary | ICD-10-CM

## 2024-02-12 DIAGNOSIS — M54.12 CERVICAL RADICULOPATHY: ICD-10-CM

## 2024-02-12 PROCEDURE — 97110 THERAPEUTIC EXERCISES: CPT | Performed by: PHYSICAL THERAPIST

## 2024-02-12 PROCEDURE — 97140 MANUAL THERAPY 1/> REGIONS: CPT | Performed by: PHYSICAL THERAPIST

## 2024-02-12 RX ORDER — LEVOCETIRIZINE DIHYDROCHLORIDE 5 MG/1
5 TABLET, FILM COATED ORAL DAILY
Qty: 90 TABLET | Refills: 5 | Status: SHIPPED | OUTPATIENT
Start: 2024-02-12

## 2024-02-12 NOTE — PROGRESS NOTES
Daily Note     Today's date: 2024  Patient name: Allyson Espinoza  : 1960  MRN: [id number]  Referring provider: Cory Trujillo MD  Dx:   Encounter Diagnosis     ICD-10-CM    1. Cervicalgia  M54.2       2. Cervical radiculopathy  M54.12       3. Strain of calf muscle, right, initial encounter  S86.793C                      Subjective: Pt reports no change in symptoms up to this point.       Objective: See treatment diary below      Assessment: Tolerated treatment well. Manual treatment to loosen up restriction in gastroc. Pt reported feeling much looser after this treatment. Followed up with therex, pt did report some cramping with theraband ankle PF, but able to complete leg press without difficulty. Patient would benefit from continued PT      Plan: Continue per plan of care.  Progress treatment as tolerated.       Precautions: chronic neck symptoms, post covid migraines, light sensitivity    POC expires Unit limit Auth Expiration date PT/OT/ST + Visit Limit?   3/18/24 4 N/a 52                               Therapeutic exercise = (TE)  Therapeutic activity= (TA)  Manual Therapy= (MT)  Neuromuscular re-education- (NRE)  Gait training= (GT)    MT 1. Seated distraction- no improvement  MT 2. Seated distraction with rotation- no improvement and increase tingling in the L arm  MT 3. Supine distraction- 3' reduced  *reduced L arm tingling, as well as L shld pain    :  TE 1. Reassessment- shld 80d prior to onset of buzzing cervical distraction reduces symptoms with shoulder flex, Cervical ext- increase in neck pain, R ROT 63d, L ROT 51d, L SB restricted as well,   MT 2. Supine cervical distraction- 5'  MT 3. Supine cervical retraction PNF- increased symptoms deferred  *L ROT- 58d  *shld flex- 70d  Mechanical traction- 10' intermittent max 20#, min 10#, speed 50%  *L ROT- 63d  *shld flex- 145d and onset of buzzing    :   TE 1. Assessment of calf pain- DL heel raise painful, SLR (-), slump (-),  "gastroc stretch, PROM WNL, MMT WNL  TE 2. Deferred seated heel raise due to pain  TE 3. Ankle PF against GTB- 3x15  *DL heel raise- reduced pain  TE 4. Ankle PF against GTB- 1x15  *DL heel raise- significantly reduced pain  TE 5. Pt education on progressive loading program for the calf  *L ROT 60d  *no buzzing with shld flex today    12/18:  TE 1. Reassessment- shld 90d prior to onset of buzzing cervical distraction reduces symptoms with shoulder flex, Cervical ext- increase in neck pain, R ROT 63d, L ROT 53d, L SB restricted as well,   MT 2. Supine cervical distraction with intermittent cervical ROT- 10'  *shld flex- 100d  NRE 3. Median nerve slider- 1x15  *shld flex- 110d  NRE 4. Median nerve slider- 1x15  *shld flex- 110d  TE 5. Pt education- neuromobilization, normal positive prognosis of cervical radiculopathy    12/20:  TE 1. Reassessment - ext 42d increase symptoms down the arm, L ROT 53 increase in symptoms down the arm  MT 2. Cervical distraction- no change with ext or L ROT  MT 3. PAIVM assessment- hypomobility throughout the upper and mid T-spine  MT 4. Gd mobs- IV T2-6 MIS 15'  *L ROT 60d no sypmtoms  *ext no change  MT 5. Supine thoracic distraction HVLA- MIS 2x  *no change in asterisks  TE 6. Pt education on HEP     1/2:  TE 1. Reassessment - Shld Flex 115d, L ROT 57 increase in symptoms down the arm  MT 2. PAIVM assessment- hypomobility throughout the upper and mid T-spine  MT 4. Gd mobs L to R transverse- T2-3 MIS 10' gd 2-3  *L ROT 61d no sypmtoms  *flex- WNL  MT 5. Supine thoracic ext over towel- 10x  *L ROT- 64d  *flex- WNL  TE 6. Pt education on HEP     1/10:  MT 1.  T/S P/A mobilizations - gr IV, gr V  MT 2. C/S lateral glides in supine - gr III-IV  TE 1.  C/S SNAGs for left C/S rotation with towel - 20x5\"  TE 2. Sitting T/S extension with half foam roll - 10x  TE 3. Bilateral shoulder ER with scap ret w/TB - GTB 2x10      1/22:  TE 1. Reassessment  MT 2. R UPA L4-S1 MIS gd 2-4  NRE 3. Supine " "sciatic slider- 10x (HEP 3x/day 1')      1/24:  MT 1. L/S P/A grIII-IV   TE 1.  Ankle PF with GTB 2x10  TE 2.  Prone press ups -2x10  TE 3.  SL leg press HR 15# 2x10  TE 4.  Wall Gastroc stretch - 3x30\"  TE 5.  Wall Soleus stretch - 3x30\"  TE 6.  Forward step downs 4\" w/ ecc. focus - 2x10  TE 7.  Supine sciatic slider - 20x    1/29:  TE 1. Reassessment   *(-) SLR  *DL heel raise- painful at top  *SL heel raise- more painful but able to complete  MT 2. Gastroc effluerage, petrissage, Pin and stretch- MIS 20'  *DL heel raise- more painful on the R than before  TE 3. Seated ankle PF with black TB- 2x20  *Dl heel raise- only tightness  *SL heel raise- more painful than before, had to stop  TE 4. Pt education on HEP, DVT    1/31  MT 1. IASTM Gastroc - KK 10'  TE 1.  Long sitting ankle PF with black TB- 2x20  TE 2.  Bilateral heel raise - 3x10  TE 3.  Standing Gastroc stretch - 4x30\"  TE 4.  Standing Soleus stretch - 4x30\"  TE 5   SL leg press HR - 15# - 3x10    2/12:  MT 1. IASTM Gastroc- MIS 8'  MT 2. Effleurage, petrissage, TrP- 10'  TE 3. Prostretch- 10x10\"  TE 4. Black TB- ankle PF- 2x10  TE 5. SL leg press HR- 25# 3x10               "

## 2024-02-19 ENCOUNTER — APPOINTMENT (OUTPATIENT)
Dept: PHYSICAL THERAPY | Facility: CLINIC | Age: 64
End: 2024-02-19
Payer: COMMERCIAL

## 2024-02-28 ENCOUNTER — APPOINTMENT (OUTPATIENT)
Dept: PHYSICAL THERAPY | Facility: CLINIC | Age: 64
End: 2024-02-28
Payer: COMMERCIAL

## 2024-03-29 ENCOUNTER — TELEPHONE (OUTPATIENT)
Dept: FAMILY MEDICINE CLINIC | Facility: CLINIC | Age: 64
End: 2024-03-29

## 2024-03-29 NOTE — TELEPHONE ENCOUNTER
Received a fax from [email or web address]. They are looking to follow up on the prior authorization on Nurtec 75.   KEY: [id number]  Please advise? Thank you!

## 2024-03-29 NOTE — TELEPHONE ENCOUNTER
PA submitted through [employer] portal. We are awaiting a determination of coverage from pt's [employer] plan.   PA submitted as Urgent.     Allyson Espinoza (Huang: [id number]) - [id number]  Nurtec 75MG dispersible tablets  Status: PA RequestCreated: March 29th, 2024Sent: March 29th, 2024

## 2024-05-04 DIAGNOSIS — E78.2 MIXED HYPERLIPIDEMIA: ICD-10-CM

## 2024-05-06 RX ORDER — ROSUVASTATIN CALCIUM 10 MG/1
TABLET, COATED ORAL
Qty: 30 TABLET | Refills: 0 | Status: SHIPPED | OUTPATIENT
Start: 2024-05-06

## 2024-05-29 DIAGNOSIS — E78.2 MIXED HYPERLIPIDEMIA: ICD-10-CM

## 2024-05-30 RX ORDER — ROSUVASTATIN CALCIUM 10 MG/1
TABLET, COATED ORAL
Qty: 30 TABLET | Refills: 0 | Status: SHIPPED | OUTPATIENT
Start: 2024-05-30

## 2024-06-27 DIAGNOSIS — E78.2 MIXED HYPERLIPIDEMIA: ICD-10-CM

## 2024-06-27 RX ORDER — ROSUVASTATIN CALCIUM 10 MG/1
TABLET, COATED ORAL
Qty: 30 TABLET | Refills: 0 | Status: SHIPPED | OUTPATIENT
Start: 2024-06-27

## 2024-07-25 DIAGNOSIS — E78.2 MIXED HYPERLIPIDEMIA: ICD-10-CM

## 2024-07-27 RX ORDER — ROSUVASTATIN CALCIUM 10 MG/1
TABLET, COATED ORAL
Qty: 90 TABLET | Refills: 1 | Status: SHIPPED | OUTPATIENT
Start: 2024-07-27

## 2024-08-30 DIAGNOSIS — G25.81 RLS (RESTLESS LEGS SYNDROME): ICD-10-CM

## 2024-08-31 DIAGNOSIS — G25.81 RLS (RESTLESS LEGS SYNDROME): ICD-10-CM

## 2024-09-01 RX ORDER — GABAPENTIN 100 MG/1
200 CAPSULE ORAL
Qty: 180 CAPSULE | Refills: 0 | OUTPATIENT
Start: 2024-09-01

## 2024-09-01 RX ORDER — GABAPENTIN 100 MG/1
200 CAPSULE ORAL
Qty: 60 CAPSULE | Refills: 0 | Status: SHIPPED | OUTPATIENT
Start: 2024-09-01

## 2024-09-01 NOTE — TELEPHONE ENCOUNTER
Duplicate    Headache Monitoring: I recommended monitoring the headaches for now. There is no evidence of increased intracranial pressure. They were instructed to call if the headaches are worsening. Xerosis Aggressive Treatment: I recommended application of Cetaphil or CeraVe numerous times a day going to bed to all dry areas. I also prescribed a topical steroid for twice daily use. Use Enhanced Counseling Feature (Automatic): No Dosing Month 4 (Required For Cumulative Dosing): 30mg BID Are Labs Available For Review?: No- Labs Deferred This Month Add Associated Diagnosis When Managing Medication Side Effects: Yes Hypertriglyceridemia Monitoring: I explained this is common when taking isotretinoin. We will monitor closely. Retinoid Dermatitis Normal Treatment: I recommended more frequent application of Cetaphil or CeraVe to the areas of dermatitis. Nosebleeds Normal Treatment: I explained this is common when taking isotretinoin. I recommended saline mist in each nostril multiple times a day. If this worsens they will contact us. Myalgia Monitoring: I explained this is common when taking isotretinoin. If this worsens they will contact us. Ipledge Number (Optional): [phone] Myalgia Treatment: I explained this is common when taking isotretinoin. If this worsens they will contact us. They may try OTC ibuprofen. Retinoid Dermatitis Aggressive Treatment: I recommended more frequent application of Cetaphil or CeraVe to the areas of dermatitis. I also prescribed a topical steroid for twice daily use until the dermatitis resolves. Cheilitis Aggressive Treatment: I recommended application of Vaseline or Aquaphor numerous times a day (as often as every hour) and before going to bed. I also prescribed a topical steroid for twice daily use. Weight Units: kilograms Cheilitis Normal Treatment: I recommended application of Vaseline or Aquaphor numerous times a day (as often as every hour) and before going to bed. Patient Weight (Optional But Required For Cumulative Dose-Numbers And Decimals Only): 59 Xerosis Normal Treatment: I recommended application of Cetaphil or CeraVe numerous times a day and before going to bed to all dry areas. Months Of Therapy Completed: 4 Counseling Text: I reviewed the side effect in detail. Patient should get monthly blood tests, not donate blood, not drive at night if vision affected, and not share medication. Xerosis Aggressive Treatment: I recommended application of Cetaphil or CeraVe numerous times a day and before going to bed to all dry areas. I also prescribed a topical steroid for twice daily use. Female Pregnancy Counseling Text: Female patients should also be on two forms of birth control while taking this medication and for one month after their last dose. Next Month's Dosage: Continue Current Dosage Detail Level: Zone Use Therapeutic Ranged Or Therapeutic Target: please select Range or Target Dosing Month 1 (Required For Cumulative Dosing): 30mg Daily Lower Range (In Mg/Kg): 120 Pounds Preamble Statement (Weight Entered In Details Tab): Reported Weight in pounds: Brand Name (Optional): Nexplanon Upper Range (In Mg/Kg): 150 Kilograms Preamble Statement (Weight Entered In Details Tab): Reported Weight in kilograms: Male Completion Statement: After discussing his treatment course we decided to discontinue isotretinoin therapy at this time. He shouldn't donate blood for one month after the last dose. He should call with any new symptoms of depression. What Is The Patient's Gender: Female Target Cumulative Dosage (In Mg/Kg): 135 Female Completion Statement: After discussing her treatment course we decided to discontinue isotretinoin therapy at this time. I explained that she would need to continue her birth control methods for at least one month after the last dosage. She should also get a pregnancy test one month after the last dose. She shouldn't donate blood for one month after the last dose. She should call with any new symptoms of depression. Xerosis Normal Treatment: I recommended application of Cetaphil or CeraVe numerous times a day going to bed to all dry areas.

## 2024-09-10 ENCOUNTER — VBI (OUTPATIENT)
Dept: ADMINISTRATIVE | Facility: OTHER | Age: 64
End: 2024-09-10

## 2024-09-10 NOTE — TELEPHONE ENCOUNTER
09/10/24 9:32 AM     Chart reviewed for Mammogram and Pap Smear (HPV) aka Cervical Cancer Screening was/were not submitted to the patient's insurance.     Beverley Gracia MA   [facility]

## 2024-09-29 DIAGNOSIS — G25.81 RLS (RESTLESS LEGS SYNDROME): ICD-10-CM

## 2024-09-30 RX ORDER — GABAPENTIN 100 MG/1
200 CAPSULE ORAL
Qty: 60 CAPSULE | Refills: 0 | Status: SHIPPED | OUTPATIENT
Start: 2024-09-30

## 2024-10-02 NOTE — TELEPHONE ENCOUNTER
A Mychart Message sent / a friendly reminder stating pt is overdue for PE/several annual screenings.

## 2024-10-03 DIAGNOSIS — G43.909 MIGRAINE WITHOUT STATUS MIGRAINOSUS, NOT INTRACTABLE, UNSPECIFIED MIGRAINE TYPE: ICD-10-CM

## 2024-10-03 RX ORDER — DULOXETIN HYDROCHLORIDE 60 MG/1
60 CAPSULE, DELAYED RELEASE ORAL DAILY
Qty: 90 CAPSULE | Refills: 3 | Status: SHIPPED | OUTPATIENT
Start: 2024-10-03

## 2024-10-04 NOTE — TELEPHONE ENCOUNTER
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  1st available is not until 2/17/25 - pt is scheduled and added to a wait list if anything opens up in her time range.

## 2024-11-12 ENCOUNTER — TELEPHONE (OUTPATIENT)
Age: 64
End: 2024-11-12

## 2024-11-12 DIAGNOSIS — Z12.31 ENCOUNTER FOR SCREENING MAMMOGRAM FOR MALIGNANT NEOPLASM OF BREAST: ICD-10-CM

## 2024-11-12 DIAGNOSIS — G25.81 RLS (RESTLESS LEGS SYNDROME): ICD-10-CM

## 2024-11-12 DIAGNOSIS — Z78.0 POSTMENOPAUSE: ICD-10-CM

## 2024-11-12 DIAGNOSIS — E78.2 MIXED HYPERLIPIDEMIA: ICD-10-CM

## 2024-11-12 DIAGNOSIS — E03.9 HYPOTHYROIDISM, UNSPECIFIED TYPE: Primary | ICD-10-CM

## 2024-11-12 DIAGNOSIS — E55.9 VITAMIN D DEFICIENCY: ICD-10-CM

## 2024-11-12 RX ORDER — GABAPENTIN 100 MG/1
200 CAPSULE ORAL
Qty: 60 CAPSULE | Refills: 0 | Status: CANCELLED | OUTPATIENT
Start: 2024-11-12

## 2024-11-12 RX ORDER — GABAPENTIN 100 MG/1
200 CAPSULE ORAL
Qty: 60 CAPSULE | Refills: 5 | Status: SHIPPED | OUTPATIENT
Start: 2024-11-12

## 2024-11-12 NOTE — TELEPHONE ENCOUNTER
Patient called in to make appointment .  Made OVS in January at 11:20 which she agreed.  On wait list for sooner.     Requested DEXA and Mammo order placed and pep reviewed how to see in MY chart if placed.  She did inquire about lab work but all labs just .  Please enter new lab orders so she can go get done.     Also pended is a gabapentin order as she will run out.    You can leave a message VM on phone and let her know orders are in my chart.  Please also let her know about medication if not refilled.

## 2024-11-16 DIAGNOSIS — G43.511 INTRACTABLE PERSISTENT MIGRAINE AURA WITHOUT CEREBRAL INFARCTION AND WITH STATUS MIGRAINOSUS: ICD-10-CM

## 2024-11-18 ENCOUNTER — HOSPITAL ENCOUNTER (OUTPATIENT)
Age: 64
Discharge: HOME | End: 2024-11-18
Payer: COMMERCIAL

## 2024-11-18 VITALS — WEIGHT: 177 LBS | BODY MASS INDEX: 33.42 KG/M2 | HEIGHT: 61 IN

## 2024-11-18 DIAGNOSIS — Z12.31 ENCOUNTER FOR SCREENING MAMMOGRAM FOR MALIGNANT NEOPLASM OF BREAST: ICD-10-CM

## 2024-11-18 DIAGNOSIS — Z78.0 POSTMENOPAUSE: ICD-10-CM

## 2024-11-18 PROCEDURE — 77080 DXA BONE DENSITY AXIAL: CPT

## 2024-11-18 PROCEDURE — 77063 BREAST TOMOSYNTHESIS BI: CPT

## 2024-11-18 PROCEDURE — 77067 SCR MAMMO BI INCL CAD: CPT

## 2024-11-18 RX ORDER — RIMEGEPANT SULFATE 75 MG/75MG
TABLET, ORALLY DISINTEGRATING ORAL
Qty: 16 TABLET | Refills: 5 | Status: SHIPPED | OUTPATIENT
Start: 2024-11-18

## 2024-11-19 ENCOUNTER — RESULTS FOLLOW-UP (OUTPATIENT)
Dept: FAMILY MEDICINE CLINIC | Facility: CLINIC | Age: 64
End: 2024-11-19

## 2024-11-19 DIAGNOSIS — R92.8 ABNORMAL MAMMOGRAM: Primary | ICD-10-CM

## 2024-11-29 ENCOUNTER — HOSPITAL ENCOUNTER (OUTPATIENT)
Dept: ULTRASOUND IMAGING | Facility: CLINIC | Age: 64
End: 2024-11-29
Payer: COMMERCIAL

## 2024-11-29 ENCOUNTER — APPOINTMENT (OUTPATIENT)
Dept: LAB | Facility: HOSPITAL | Age: 64
End: 2024-11-29
Payer: COMMERCIAL

## 2024-11-29 DIAGNOSIS — E03.9 HYPOTHYROIDISM, UNSPECIFIED TYPE: ICD-10-CM

## 2024-11-29 DIAGNOSIS — E78.2 MIXED HYPERLIPIDEMIA: ICD-10-CM

## 2024-11-29 DIAGNOSIS — E55.9 VITAMIN D DEFICIENCY: ICD-10-CM

## 2024-11-29 DIAGNOSIS — R92.8 ABNORMAL MAMMOGRAM: ICD-10-CM

## 2024-11-29 LAB
25(OH)D3 SERPL-MCNC: 71.1 NG/ML (ref 30–100)
ALBUMIN SERPL BCG-MCNC: 4.3 G/DL (ref 3.5–5)
ALP SERPL-CCNC: 171 U/L (ref 34–104)
ALT SERPL W P-5'-P-CCNC: 24 U/L (ref 7–52)
ANION GAP SERPL CALCULATED.3IONS-SCNC: 4 MMOL/L (ref 4–13)
AST SERPL W P-5'-P-CCNC: 16 U/L (ref 13–39)
BASOPHILS # BLD AUTO: 0.03 THOUSANDS/ΜL (ref 0–0.1)
BASOPHILS NFR BLD AUTO: 0 % (ref 0–1)
BILIRUB SERPL-MCNC: 0.36 MG/DL (ref 0.2–1)
BUN SERPL-MCNC: 13 MG/DL (ref 5–25)
CALCIUM SERPL-MCNC: 9.9 MG/DL (ref 8.4–10.2)
CHLORIDE SERPL-SCNC: 109 MMOL/L (ref 96–108)
CHOLEST SERPL-MCNC: 170 MG/DL (ref ?–200)
CO2 SERPL-SCNC: 29 MMOL/L (ref 21–32)
CREAT SERPL-MCNC: 0.87 MG/DL (ref 0.6–1.3)
EOSINOPHIL # BLD AUTO: 0.27 THOUSAND/ΜL (ref 0–0.61)
EOSINOPHIL NFR BLD AUTO: 3 % (ref 0–6)
ERYTHROCYTE [DISTWIDTH] IN BLOOD BY AUTOMATED COUNT: 13.2 % (ref 11.6–15.1)
GFR SERPL CREATININE-BSD FRML MDRD: 71 ML/MIN/1.73SQ M
GLUCOSE P FAST SERPL-MCNC: 104 MG/DL (ref 65–99)
HCT VFR BLD AUTO: 43.7 % (ref 34.8–46.1)
HDLC SERPL-MCNC: 37 MG/DL
HGB BLD-MCNC: 13.7 G/DL (ref 11.5–15.4)
IMM GRANULOCYTES # BLD AUTO: 0.03 THOUSAND/UL (ref 0–0.2)
IMM GRANULOCYTES NFR BLD AUTO: 0 % (ref 0–2)
LDLC SERPL CALC-MCNC: 100 MG/DL (ref 0–100)
LYMPHOCYTES # BLD AUTO: 4 THOUSANDS/ΜL (ref 0.6–4.47)
LYMPHOCYTES NFR BLD AUTO: 43 % (ref 14–44)
MCH RBC QN AUTO: 31.3 PG (ref 26.8–34.3)
MCHC RBC AUTO-ENTMCNC: 31.4 G/DL (ref 31.4–37.4)
MCV RBC AUTO: 100 FL (ref 82–98)
MONOCYTES # BLD AUTO: 0.7 THOUSAND/ΜL (ref 0.17–1.22)
MONOCYTES NFR BLD AUTO: 8 % (ref 4–12)
NEUTROPHILS # BLD AUTO: 4.31 THOUSANDS/ΜL (ref 1.85–7.62)
NEUTS SEG NFR BLD AUTO: 46 % (ref 43–75)
NONHDLC SERPL-MCNC: 133 MG/DL
NRBC BLD AUTO-RTO: 0 /100 WBCS
PLATELET # BLD AUTO: 246 THOUSANDS/UL (ref 149–390)
PMV BLD AUTO: 10 FL (ref 8.9–12.7)
POTASSIUM SERPL-SCNC: 4.8 MMOL/L (ref 3.5–5.3)
PROT SERPL-MCNC: 7 G/DL (ref 6.4–8.4)
RBC # BLD AUTO: 4.38 MILLION/UL (ref 3.81–5.12)
SODIUM SERPL-SCNC: 142 MMOL/L (ref 135–147)
TRIGL SERPL-MCNC: 163 MG/DL (ref ?–150)
TSH SERPL DL<=0.05 MIU/L-ACNC: 1.71 UIU/ML (ref 0.45–4.5)
WBC # BLD AUTO: 9.34 THOUSAND/UL (ref 4.31–10.16)

## 2024-11-29 PROCEDURE — 80061 LIPID PANEL: CPT

## 2024-11-29 PROCEDURE — 80053 COMPREHEN METABOLIC PANEL: CPT

## 2024-11-29 PROCEDURE — 85025 COMPLETE CBC W/AUTO DIFF WBC: CPT

## 2024-11-29 PROCEDURE — 36415 COLL VENOUS BLD VENIPUNCTURE: CPT

## 2024-11-29 PROCEDURE — 76642 ULTRASOUND BREAST LIMITED: CPT

## 2024-11-29 PROCEDURE — 82306 VITAMIN D 25 HYDROXY: CPT

## 2024-11-29 PROCEDURE — 84443 ASSAY THYROID STIM HORMONE: CPT

## 2024-12-02 ENCOUNTER — RESULTS FOLLOW-UP (OUTPATIENT)
Dept: FAMILY MEDICINE CLINIC | Facility: CLINIC | Age: 64
End: 2024-12-02

## 2024-12-11 DIAGNOSIS — E78.2 MIXED HYPERLIPIDEMIA: ICD-10-CM

## 2024-12-12 RX ORDER — ROSUVASTATIN CALCIUM 10 MG/1
10 TABLET, COATED ORAL DAILY
Qty: 90 TABLET | Refills: 1 | Status: SHIPPED | OUTPATIENT
Start: 2024-12-12

## 2025-01-07 ENCOUNTER — OFFICE VISIT (OUTPATIENT)
Dept: FAMILY MEDICINE CLINIC | Facility: CLINIC | Age: 65
End: 2025-01-07
Payer: COMMERCIAL

## 2025-01-07 VITALS
WEIGHT: 175 LBS | TEMPERATURE: 98.3 F | HEART RATE: 98 BPM | HEIGHT: 61 IN | DIASTOLIC BLOOD PRESSURE: 78 MMHG | OXYGEN SATURATION: 98 % | BODY MASS INDEX: 33.04 KG/M2 | SYSTOLIC BLOOD PRESSURE: 118 MMHG

## 2025-01-07 DIAGNOSIS — Z00.00 HEALTH MAINTENANCE EXAMINATION: Primary | ICD-10-CM

## 2025-01-07 DIAGNOSIS — J30.1 CHRONIC SEASONAL ALLERGIC RHINITIS DUE TO POLLEN: ICD-10-CM

## 2025-01-07 DIAGNOSIS — J45.909 ASTHMA WITHOUT STATUS ASTHMATICUS WITHOUT COMPLICATION, UNSPECIFIED ASTHMA SEVERITY, UNSPECIFIED WHETHER PERSISTENT: ICD-10-CM

## 2025-01-07 DIAGNOSIS — G25.81 RLS (RESTLESS LEGS SYNDROME): ICD-10-CM

## 2025-01-07 DIAGNOSIS — J20.9 ACUTE BRONCHITIS, UNSPECIFIED ORGANISM: ICD-10-CM

## 2025-01-07 DIAGNOSIS — Z12.11 COLON CANCER SCREENING: ICD-10-CM

## 2025-01-07 DIAGNOSIS — N32.81 OAB (OVERACTIVE BLADDER): ICD-10-CM

## 2025-01-07 DIAGNOSIS — G43.511 INTRACTABLE PERSISTENT MIGRAINE AURA WITHOUT CEREBRAL INFARCTION AND WITH STATUS MIGRAINOSUS: ICD-10-CM

## 2025-01-07 DIAGNOSIS — Z12.2 SCREENING FOR LUNG CANCER: ICD-10-CM

## 2025-01-07 DIAGNOSIS — Z72.0 TOBACCO USE: ICD-10-CM

## 2025-01-07 PROCEDURE — 99396 PREV VISIT EST AGE 40-64: CPT | Performed by: FAMILY MEDICINE

## 2025-01-07 RX ORDER — LEVOTHYROXINE SODIUM 50 UG/1
50 TABLET ORAL DAILY
Qty: 90 TABLET | Refills: 1 | Status: CANCELLED | OUTPATIENT
Start: 2025-01-07

## 2025-01-07 RX ORDER — RIMEGEPANT SULFATE 75 MG/75MG
TABLET, ORALLY DISINTEGRATING ORAL
Qty: 16 TABLET | Refills: 5 | Status: SHIPPED | OUTPATIENT
Start: 2025-01-07

## 2025-01-07 RX ORDER — LEVOCETIRIZINE DIHYDROCHLORIDE 5 MG/1
5 TABLET, FILM COATED ORAL DAILY
Qty: 90 TABLET | Refills: 5 | Status: SHIPPED | OUTPATIENT
Start: 2025-01-07

## 2025-01-07 RX ORDER — FESOTERODINE FUMARATE 4 MG/1
1 TABLET, FILM COATED, EXTENDED RELEASE ORAL DAILY
Qty: 90 TABLET | Refills: 5 | Status: SHIPPED | OUTPATIENT
Start: 2025-01-07

## 2025-01-07 RX ORDER — ALBUTEROL SULFATE 90 UG/1
2 INHALANT RESPIRATORY (INHALATION) EVERY 6 HOURS PRN
Qty: 8.5 G | Refills: 0 | Status: SHIPPED | OUTPATIENT
Start: 2025-01-07

## 2025-01-07 RX ORDER — GABAPENTIN 100 MG/1
200 CAPSULE ORAL
Qty: 60 CAPSULE | Refills: 5 | Status: SHIPPED | OUTPATIENT
Start: 2025-01-07
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
## 2025-01-23 ENCOUNTER — OFFICE VISIT (OUTPATIENT)
Dept: URGENT CARE | Facility: CLINIC | Age: 65
End: 2025-01-23
Payer: COMMERCIAL

## 2025-01-23 ENCOUNTER — APPOINTMENT (OUTPATIENT)
Dept: RADIOLOGY | Facility: CLINIC | Age: 65
End: 2025-01-23
Payer: COMMERCIAL

## 2025-01-23 VITALS
HEART RATE: 101 BPM | OXYGEN SATURATION: 96 % | RESPIRATION RATE: 24 BRPM | DIASTOLIC BLOOD PRESSURE: 74 MMHG | SYSTOLIC BLOOD PRESSURE: 141 MMHG | TEMPERATURE: 99 F

## 2025-01-23 DIAGNOSIS — R05.1 ACUTE COUGH: ICD-10-CM

## 2025-01-23 DIAGNOSIS — J18.9 PNEUMONIA OF LEFT LOWER LOBE DUE TO INFECTIOUS ORGANISM: Primary | ICD-10-CM

## 2025-01-23 PROCEDURE — 99213 OFFICE O/P EST LOW 20 MIN: CPT | Performed by: STUDENT IN AN ORGANIZED HEALTH CARE EDUCATION/TRAINING PROGRAM

## 2025-01-23 PROCEDURE — 71046 X-RAY EXAM CHEST 2 VIEWS: CPT

## 2025-01-23 RX ORDER — DOXYCYCLINE 100 MG/1
100 CAPSULE ORAL EVERY 12 HOURS SCHEDULED
Qty: 10 CAPSULE | Refills: 0 | Status: SHIPPED | OUTPATIENT
Start: 2025-01-23 | End: 2025-01-23

## 2025-01-23 RX ORDER — DOXYCYCLINE 100 MG/1
100 CAPSULE ORAL EVERY 12 HOURS SCHEDULED
Qty: 10 CAPSULE | Refills: 0 | Status: SHIPPED | OUTPATIENT
Start: 2025-01-23 | End: 2025-01-28

## 2025-01-23 RX ORDER — IPRATROPIUM BROMIDE AND ALBUTEROL SULFATE 2.5; .5 MG/3ML; MG/3ML
3 SOLUTION RESPIRATORY (INHALATION) ONCE
Status: COMPLETED | OUTPATIENT
Start: 2025-01-23 | End: 2025-01-23

## 2025-01-23 RX ADMIN — IPRATROPIUM BROMIDE AND ALBUTEROL SULFATE 3 ML: 2.5; .5 SOLUTION RESPIRATORY (INHALATION) at 18:16
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
## 2025-01-23 NOTE — LETTER
January 23, 2025     Patient: Allyson Espinoza   YOB: 1960   Date of Visit: 1/23/2025       To Whom It May Concern:    It is my medical opinion that Allyson Espinoza may return to work on 1/25/2025 .    If you have any questions or concerns, please don't hesitate to call.         Sincerely,        Shae Galvez,     CC: No Recipients

## 2025-01-29 ENCOUNTER — PATIENT MESSAGE (OUTPATIENT)
Dept: FAMILY MEDICINE CLINIC | Facility: CLINIC | Age: 65
End: 2025-01-29

## 2025-01-30 ENCOUNTER — OFFICE VISIT (OUTPATIENT)
Dept: FAMILY MEDICINE CLINIC | Facility: CLINIC | Age: 65
End: 2025-01-30
Payer: COMMERCIAL

## 2025-01-30 VITALS
HEART RATE: 94 BPM | OXYGEN SATURATION: 99 % | DIASTOLIC BLOOD PRESSURE: 84 MMHG | BODY MASS INDEX: 33.42 KG/M2 | WEIGHT: 177 LBS | SYSTOLIC BLOOD PRESSURE: 126 MMHG | TEMPERATURE: 97.5 F | HEIGHT: 61 IN

## 2025-01-30 DIAGNOSIS — J45.20 MILD INTERMITTENT ASTHMA WITHOUT STATUS ASTHMATICUS WITHOUT COMPLICATION: ICD-10-CM

## 2025-01-30 DIAGNOSIS — R06.2 WHEEZING: ICD-10-CM

## 2025-01-30 DIAGNOSIS — J06.9 UPPER RESPIRATORY TRACT INFECTION, UNSPECIFIED TYPE: Primary | ICD-10-CM

## 2025-01-30 DIAGNOSIS — R05.1 ACUTE COUGH: ICD-10-CM

## 2025-01-30 PROCEDURE — 99214 OFFICE O/P EST MOD 30 MIN: CPT

## 2025-01-30 RX ORDER — IPRATROPIUM BROMIDE AND ALBUTEROL 20; 100 UG/1; UG/1
1 SPRAY, METERED RESPIRATORY (INHALATION) 4 TIMES DAILY
Qty: 4 G | Refills: 0 | Status: SHIPPED | OUTPATIENT
Start: 2025-01-30

## 2025-01-30 RX ORDER — METHYLPREDNISOLONE 4 MG/1
TABLET ORAL
Qty: 21 EACH | Refills: 0 | Status: SHIPPED | OUTPATIENT
Start: 2025-01-30

## 2025-01-30 RX ORDER — BENZONATATE 100 MG/1
100 CAPSULE ORAL 3 TIMES DAILY PRN
Qty: 20 CAPSULE | Refills: 0 | Status: SHIPPED | OUTPATIENT
Start: 2025-01-30

## 2025-01-30 NOTE — LETTER
January 30, 2025     Patient: Allyson Espinoza  YOB: 1960  Date of Visit: 1/30/2025      To Whom it May Concern:    Allyson Espinoza is under my professional care. Allyson was seen in my office on 1/30/2025. Allyson may return to work on 2/3/2025 .    If you have any questions or concerns, please don't hesitate to call.         Sincerely,          Gracia Bourgeois PA-C        CC: No Recipients

## 2025-01-30 NOTE — PROGRESS NOTES
Name: Allyson Espinoza      : 1960      MRN: [id number]  Encounter Provider: Gracia Bourgeois PA-C  Encounter Date: 2025   Encounter department: [facility] [address] [facility]  :  Assessment & Plan  Upper respiratory tract infection, unspecified type  -F/U from , 2025 she was treated for suspected pneumonia with doxycycline 100 mg BID x 5 days (CXR did not show evidence of pneumonia)  -Finished abx on Tuesday, notes symptoms are improving but still having some congestion, chest tightness, and trouble breathing, SOB, wheezing, fatigue, and cough  -On exam, decreased air movement with bilateral wheezing heard, throat erythematous with PND   -At , she noted improvement with the Duo-Neb  -Ordered medrol dose peyton to help with the inflammation, ipratropium-albuterol inhaler, and tessalon perles  -emphasized importance of rest, increasing fluids, and continue with sudafed and tylenol as needed for symptoms   -Discussed post viral cough could linger for weeks and she should continue to monitor her symptoms   -PT has extensive list of allergies, discussed reporting to ED if any allergic reaction symptoms develop     Orders:    methylPREDNISolone 4 MG tablet therapy pack; Use as directed on package    Mild intermittent asthma without status asthmaticus without complication  -B/L exp wheezing  -History of smoking  -Ordered medrol dose peyton and ipratropium-albuterol inhaler   -Reports she does not feel good after just taking the albuterol inhaler at home     Orders:    methylPREDNISolone 4 MG tablet therapy pack; Use as directed on package    ipratropium-albuterol (Combivent Respimat) inhaler; Inhale 1 puff 4 (four) times a day    Acute cough  -Reports cough is continuing to linger and is causing rib pain  -Orderes tessalon perles to help suppress cough   Orders:    benzonatate (TESSALON PERLES) 100 mg capsule; Take 1 capsule (100 mg total) by mouth 3 (three) times a day as needed  "for cough    Wheezing    Orders:    ipratropium-albuterol (Combivent Respimat) inhaler; Inhale 1 puff 4 (four) times a day           History of Present Illness     CLAUDETTE Morgan presents to the office for follow up from urgent care 1/23/2024. She was treated for suspected pneumonia with doxycycline 100 mg BID x 5 days. She took with prednisone and benadryl due to frequent allergic responses to medications. She had the Duo Neb breathing treatment in  and this imporved SOB greatly.     She notes chest tightness has improved, the coughing is improving. She notes she still feels very fatigued. She notes feeling chills. She notes feeling SOB and wheezing. She has bee using albuterol inhaler and it made her head feel worse. She has been taking tussin DM and tylenol, and sudafed    Denies fevers, n/v/d.      Review of Systems   Constitutional:  Positive for chills and fatigue. Negative for activity change, appetite change and fever.   HENT:  Positive for ear pain, postnasal drip and sinus pressure. Negative for congestion, rhinorrhea, sinus pain and sore throat.    Eyes:  Negative for pain.   Respiratory:  Positive for cough, shortness of breath and wheezing.    Cardiovascular:  Negative for chest pain and leg swelling.   Gastrointestinal:  Negative for abdominal distention, abdominal pain, constipation, diarrhea, nausea and vomiting.   Genitourinary:  Negative for dysuria, frequency and urgency.   Musculoskeletal:  Negative for gait problem.   Skin:  Negative for rash.   Neurological:  Negative for dizziness, light-headedness and headaches.         Objective   /84 (Patient Position: Sitting, Cuff Size: Standard)   Pulse 94   Temp 97.5 °F (36.4 °C)   Ht 5' 1\" (1.549 m)   Wt 80.3 kg (177 lb)   SpO2 99%   BMI 33.44 kg/m²      Physical Exam  Vitals reviewed.   Constitutional:       General: She is not in acute distress.     Appearance: Normal appearance.   HENT:      Head: Normocephalic and atraumatic.      Right " Ear: Ear canal and external ear normal. Tympanic membrane is perforated. Tympanic membrane is not erythematous, retracted or bulging.      Left Ear: Tympanic membrane, ear canal and external ear normal.      Ears:      Comments: Left perf TM - healing      Nose: Congestion present.      Right Sinus: No maxillary sinus tenderness or frontal sinus tenderness.      Left Sinus: No maxillary sinus tenderness or frontal sinus tenderness.      Mouth/Throat:      Mouth: Mucous membranes are moist.      Pharynx: Posterior oropharyngeal erythema and postnasal drip present.   Eyes:      Extraocular Movements: Extraocular movements intact.      Conjunctiva/sclera: Conjunctivae normal.   Cardiovascular:      Rate and Rhythm: Normal rate and regular rhythm.      Heart sounds: Normal heart sounds. No murmur heard.  Pulmonary:      Effort: Pulmonary effort is normal.      Breath sounds: Decreased air movement present. Wheezing present. No rhonchi or rales.   Abdominal:      General: Bowel sounds are normal. There is no distension.      Palpations: Abdomen is soft.      Tenderness: There is no abdominal tenderness.   Musculoskeletal:      Cervical back: Neck supple.      Right lower leg: No edema.      Left lower leg: No edema.   Lymphadenopathy:      Cervical: No cervical adenopathy.   Skin:     General: Skin is warm.      Capillary Refill: Capillary refill takes less than 2 seconds.      Findings: No rash.   Neurological:      Mental Status: She is alert. Mental status is at baseline.             Gracia Bourgeois PA-C  [facility]  1/30/2025 2:27 PM

## 2025-01-30 NOTE — ASSESSMENT & PLAN NOTE
-B/L exp wheezing  -History of smoking  -Ordered medrol dose peyton and ipratropium-albuterol inhaler   -Reports she does not feel good after just taking the albuterol inhaler at home     Orders:    methylPREDNISolone 4 MG tablet therapy pack; Use as directed on package    ipratropium-albuterol (Combivent Respimat) inhaler; Inhale 1 puff 4 (four) times a day

## 2025-01-30 NOTE — PATIENT COMMUNICATION
Spoke w pt / scheduled. Pt gave many thanks.     NEXT APPT  With Family Medicine (Gracia Bourgeois PA-C)  01/30/2025 at 1:40 PM

## 2025-02-06 PROBLEM — Z00.00 HEALTH MAINTENANCE EXAMINATION: Status: RESOLVED | Noted: 2018-06-20 | Resolved: 2025-02-06

## 2025-02-07 DIAGNOSIS — J45.909 ASTHMA WITHOUT STATUS ASTHMATICUS WITHOUT COMPLICATION, UNSPECIFIED ASTHMA SEVERITY, UNSPECIFIED WHETHER PERSISTENT: ICD-10-CM

## 2025-02-07 RX ORDER — ALBUTEROL SULFATE 90 UG/1
INHALANT RESPIRATORY (INHALATION)
Qty: 8.5 G | Refills: 5 | Status: SHIPPED | OUTPATIENT
Start: 2025-02-07

## 2025-02-10 DIAGNOSIS — R05.1 ACUTE COUGH: ICD-10-CM

## 2025-02-10 RX ORDER — BENZONATATE 100 MG/1
100 CAPSULE ORAL 3 TIMES DAILY PRN
Qty: 20 CAPSULE | Refills: 0 | Status: SHIPPED | OUTPATIENT
Start: 2025-02-10

## 2025-02-10 NOTE — TELEPHONE ENCOUNTER
Patient still has a bad cough. Patient stated this medication helped during the night. Patient's pain was subsiding in her side and ribs, but now from coughing so much last night 2/9 patient is in pain again.     Please advise  Thank you     Medication: Benzonatete     Dose/Frequency: 100 mg capsule     Quantity: 20    Pharmacy: [id number]    Office:   [] PCP/Provider - Gracia Bourgeois PA-C   [] Speciality/Provider -     Does the patient have enough for 3 days?   [] Yes   [x] No - Send as HP to POD

## 2025-03-18 ENCOUNTER — TELEPHONE (OUTPATIENT)
Dept: FAMILY MEDICINE CLINIC | Facility: CLINIC | Age: 65
End: 2025-03-18

## 2025-03-18 NOTE — TELEPHONE ENCOUNTER
PA for (Nurtec) 75 mg  APPROVED     Date(s) approved 01/18/2025 to 09/17/2025    Case # [id number]    Patient advised by          []MyChart Message  []Phone call   [x]LMOM  []L/M to call office as no active Communication consent on file  []Unable to leave detailed message as VM not approved on Communication consent       Pharmacy advised by    [x]Fax  []Phone call  []Secure Chat    Approval letter scanned into Media Yes

## 2025-03-18 NOTE — TELEPHONE ENCOUNTER
PA for (Nurtec) 75 mg SUBMITTED to     via    [x]CMM-KEY: [id number]   []Surescripts-Case ID #   []Availity-Auth ID # NDC #   []Faxed to plan   []Other website   []Phone call Case ID #     [x]PA sent as URGENT    All office notes, labs and other pertaining documents and studies sent. Clinical questions answered. Awaiting determination from insurance company.     Turnaround time for your insurance to make a decision on your Prior Authorization can take 7-21 business days.

## 2025-04-09 NOTE — PROGRESS NOTES
"Speech-Language Pathology Initial Evaluation    Today's date: 4/10/2025   Patient’s name: Allyson Espinoza  : 1960  MRN: [id number]  Safety measures: voice precautions  Referring provider: Cayden Aviles DO    Encounter Diagnosis     ICD-10-CM    1. Vocal cord nodule  J38.2 Ambulatory referral to Speech Therapy          Assessment:  Patient presents with a moderate-severe voice disorder c/b hoarseness, strained, and pitch breaks secondary to vocal cord nodule an/or polyp. From subjective assessment, patient has a \"moderate\" voice disorder per VHI scoring guidelines.  From objective measures, patient scored out of the norms for s/z ratio, MPT, pitch range, habitual pitch, and MDVP.& MTPP. Numbers indicated in red are significantly out of the norms. From manual palpitation, tension was noted on L side.  Patient would benefit from outpatient skilled Speech Therapy services to education on the vocal tract/hygiene,  further education/training on strategies to facilitate improved vocal quality/communication success, promote voice efficiency, promote safety, voice exercises such as SOVTE and resonant therapy, laryngeal massaging, facilitate overall improved quality of life and instruction on HEP. Skilled therapy is necessary to optimize vocal function.      short-term goals:   Patient will be educated on vocal hygiene and laryngeal massaging/stretches demonstrate understanding of recommendations to facilitate increased quality of voice (to be achieved in 2-3 weeks).      Patient will utilize diaphragmatic breathing during oral sentence/paragraph reading in 80% of opportunities to facilitate increased breath support for voice/speaking, decrease laryngeal effort, and improve glottic closure (to be achieved in 4-6 weeks).      Patient will utilize semi-occluded vocal tract exercises without laryngeal tension in 80% of opportunities to promote healthy vocal function (to be achieved in 4-6 weeks).      Patient " "will utilize a forward tone focused/resonant voice and vocal function exercises in 80% of opportunities to decrease vocal hyperfunction and improve voice quality & vocal projection (to be achieved in 4-6 weeks).      Patient will produce a coordinated voice production in different situations (e.g., telephone, over background noise, emotional topics, speaking at a distance) in 80% of opportunities as perceptually judged by clinician (to be achieved in 4-6 weeks).     Long-term goals:  -Patient will improve vocal quality for increased functional communication (to be achieved by discharge).      Patient will independently utilize vocal function exercises to maintain best level of voice to facilitate increased generational skills into conversational speech (to be achieved by discharge).       Plan:  Patient would benefit from outpatient skilled Speech Therapy services: Voice therapy  Frequency: 1x weekly for 45 mins sessions  Duration: 2 months  Intervention certification from: 4/10/2025  Intervention certification to: 6/9/2025      Subjective:  History of present illness: Patient is a 64 y.o. female who was referred to outpatient skilled Speech Therapy services for a voice evaluation. Patient was seen by ENT on 3/19/25 for hearing surveillance. However, patient reported changes in vocal quality since being ill (1/25/). Scope was attempted for that visit, however, was halted d/t coughing episode. Seen again by ENT on 3/26 which scope revealed an abnormality on the left vocal cord. Her hoarseness remains. Recommended for speech therapy and management of post-nasal drip.    Patient's goal(s): \"to help voice    Pain: Absent   Hearing: Decreased  Vision: WFL  Home environment/lifestyle: lives independently  Highest level of education: Bachelor's degree  Vocational status: teacher      Objective:  VOICE EVALUATION:  Interview:   Chief complaint: inconsistency effort to speak  -Onset: associated with viral infection  -Symptom " progression since onset has been: stable  -Associated symptoms: cough  -Voice is better: morning  -Voice is worse: evening  -Occupation: teacher  -Vocal demand: high   -Amplification: Never  -Past training or therapy: none  -Past problems: none  -Singing: no  -Voice surgery: no  Vocal hygiene:  -Smoking: 10-15 cigarettes  -Water intake daily in oz: 40 fl oz  -Alcohol intake servings weekly: none  -Caffeine intake daily in oz: none  -Caffeine-free beverages daily in oz: 8-10 fl oz  -Throat clearing: frequent  -Lozenges: mentholated  -Exposure to chemicals, dry, or pallavi environments: none  -Cough: yes  Nature of the cough is chronic/persistent  Onset: viral illness in 1/25  Course: stable  Frequency of coughing fits: multiple  Duration of coughing fits: 1-2 coughs  Triggers: strong smells  perfumes/fragrance and exercise/exertion  Episodes are: gradual  Symptoms increase when lying down: yes  Symptoms improve with bronchodilators/asthma meds: yes  -Allergy testing: yes  -Allergies: food and medications  -Nasal symptoms: post-nasal drip  -GERD/LPR symptoms: none  -Recurrent URI: none  -Other major medical conditions: none      Subjective Observations:  Extrinsic Muscle Strength and Flexibility (assessed with manual palpation): Patient demonstrated mild-moderate left laryngeal tension, as noted upon palpation/observation by clinician at rest      Patient Self-Ratings:  -The Voice Handicap Index (VHI) is a self-administered, 30-item outcomes instrument to quantify patients' perception of their voice handicap. It is a list of statements that many people have used to describe their voices and the effects of their voices on their lives. Patient indicated how frequently she has the same experience using a rating point scale (“never” = 0, “almost never” = 1, “sometimes” = 2, “almost always” = 3, and “always” = 4). Patient obtained a score of 55/120, indicating a self-perceived impairment level of moderate.     Subscale:  Score: Self-Perceived Impairment Level:   Physical 15/40 Mild   Functional 30/40 Moderate   Emotional 10/40 Mild        TOTAL 55/120 Moderate       Objective Measurements/Voice Parameters:  RESPIRATORY EFFICIENCY:   -S:Z Ratio Task: Patient was instructed to sustain the sounds /s/ and /z/ to examine the coordination and efficiency of respiration and voice production. Normative data suggests that adults can prolong these sounds for 20-25 seconds. Ratios of 1.4 and above are consistent with laryngeal inefficiency, and ratios of 2.0 and above are suggestive of vocal fold pathology. Patient's S:Z ratio was 0.01 (8.58 sec : 7.26 sec).     -Maximum Phonation Time: Patient was instructed to sustain /a/ to measure respiratory and laryngeal coordination and efficiency. Adults are typically able to prolong vowels sound for 15-20 seconds. Reduced MPT may suggest poor respiratory support, or poor medial glottal closure. Patient's MPT was 7.30 seconds. Noted breath support was adequate, however, voice break caused halt in MPT.      MEASURES OF PITCH:  The [employer] IV, a computer-driven voice analysis program, was used to collect objective voice data using the Visi-Pitch Multidimensional Voice Program (MDVP) & Real Time Pitch Program (MTPP).     -Multi-Dimensional Voice Profile (MDVP): This is obtained on the phonation of the sound /a/, in which dimensions of the voice, including frequncy perturbations, amplitude perturbations, variations in F0, noise to harmonic ratio, voice turbulence Index, soft phonation Index, tremours in frequency and amplitude, degree of subharmonics, and degree of voicing apart from the frequency and amplitude, are reflected.      Normative Data:   Mean Fundamental Frequency (F0) 171.862 Hz 243.9 Hz   Jitter (RAP) 1.4% 0.378%   Shimmer 4.6% 1.997%   Fcged-ep-Hnbrwpefz Ratio (NHR) 0.129 0.112       -Habitual Pitch: Patient was instructed to engage in two different speaking tasks (counting and reading) to  "calculate the pitch she uses most frequently.     Task: Mean F0 (Hz) Min-Max Range (Hz) Normative Data:   Speaking (counting 1-10) 138.500 104..69 225 Hz (180 Hz -250 Hz)   Reading (\"[place]”) 874 90..60 225 Hz (180 Hz -250 Hz)       -Maximal Phonational Frequency Range: Patient was instructed to voice from lowest to highest notes.     Task Min-Max Range (Hz) Normative Data:   Gliding 197..61 134 Hz-895 Hz         Treatment:  Patient was educated on various vocal abuse behaviors and vocal hygiene throughout assessment. Vocal abuses included decreased water and increased caffeine intake, coughing and throat-clearing, thoracic/clavicular breathing, straining voice, whispering. Vocal hygiene strategies included increased water intake, decreased caffeine intake, throat-clearing awareness, increased breath support/diaphragmatic breathing, compensatory strategies to minimize vocal abuses during work day, confidential voice. Patient was agreeable.      Visit Tracking:  POC expires Unit limit Auth Expiration date PT/OT + Visit Limit?   6/10/25 No cog tx codes pending pending                           Visit/Unit Tracking  AUTH Status:  Date 4/10              yes Used                Remaining                  Intervention Comments:  35 mins of voice eval, 10 mins of voice education  "

## 2025-04-10 ENCOUNTER — EVALUATION (OUTPATIENT)
Age: 65
End: 2025-04-10
Payer: COMMERCIAL

## 2025-04-10 DIAGNOSIS — J38.2 VOCAL CORD NODULE: ICD-10-CM

## 2025-04-10 PROCEDURE — 92524 BEHAVRAL QUALIT ANALYS VOICE: CPT

## 2025-04-10 PROCEDURE — 92507 TX SP LANG VOICE COMM INDIV: CPT

## 2025-04-12 DIAGNOSIS — M79.7 FIBROMYALGIA: ICD-10-CM

## 2025-04-14 RX ORDER — ORPHENADRINE CITRATE 100 MG/1
100 TABLET ORAL 2 TIMES DAILY
Qty: 100 TABLET | Refills: 7 | Status: SHIPPED | OUTPATIENT
Start: 2025-04-14

## 2025-04-15 ENCOUNTER — OFFICE VISIT (OUTPATIENT)
Age: 65
End: 2025-04-15
Payer: COMMERCIAL

## 2025-04-15 DIAGNOSIS — J38.2 VOCAL CORD NODULE: Primary | ICD-10-CM

## 2025-04-15 PROCEDURE — 92507 TX SP LANG VOICE COMM INDIV: CPT

## 2025-04-15 NOTE — PROGRESS NOTES
Daily Speech Treatment Note    Today's date: 4/15/2025   Patient’s name: Allsyon Espinoza  : 1960  MRN: [id number]  Safety measures: voice precautions  Referring provider: Cayden Aviles DO    Encounter Diagnosis     ICD-10-CM    1. Vocal cord nodule  J38.2           Visit Tracking:  POC expires Unit limit Auth Expiration date PT/OT + Visit Limit?   6/10/25 No cog tx codes pending pending                           Visit/Unit Tracking  AUTH Status:  Date 4/10 4/15             yes Used 1 2              Remaining                    Subjective/Behavioral:  -Patient reports is doing better d/t getting more sleep.    Objective/Assessment:  -Reviewed testing results and goals in plan care with patient. Patient is in agreement at this time.      Short-term goals:  Patient will be educated on vocal hygiene and laryngeal massaging/stretches demonstrate understanding of recommendations to facilitate increased quality of voice (to be achieved in 2-3 weeks).     Reviewed vocal hygiene do's and don'ts.   Targeting decreasing laryngeal tension-   Yes stretch-Completed 1 set of 10 reps.  Chin to chest stretch and elevation- Completed 1 set of 10 reps.   Head tilt (each side)- Completed 1 set of 10 reps.  Head turn with chin elevation: 1 set of 10 reps    Completed 5 mins of laryngeal massaging. Patient reports increased tension/soreness on L side.       Patient will utilize diaphragmatic breathing during oral sentence/paragraph reading in 80% of opportunities to facilitate increased breath support for voice/speaking, decrease laryngeal effort, and improve glottic closure (to be achieved in 4-6 weeks).      Patient will utilize semi-occluded vocal tract exercises without laryngeal tension in 80% of opportunities to promote healthy vocal function (to be achieved in 4-6 weeks).        Semi-Occluded Vocal Tract Exercises- To target relaxed laryngeal posture and coordination between phonation and respiration, the  following activities were completed using principles of SOVTE. Utilized [employer] videos for visual demonstration of each exercise.     Cup Bubbling  Steady blowing- no voice- 1 rep- Av secs- min cueing. Patient benefited from visual demonstration. Noted good steady continuous bubbles.   Steady blowing- Voice on-3 reps- Avg: 10 secs --min cueing. Patient benefited from visual demonstration. Minor voice breaks. Provided education to halt exercise when voice break occurs  Steady blowing- voice on, ascending glide- 3 reps- min cueing. Patient benefited from visual demonstration. Minor voice breaks for high note. Provided education to go to peak high note.   Steady blowing- voice on, descending glide-3 reps-min cueing. -Patient benefited from visual demonstration. Started at a lower high note to aid with reducing voice tension.   Steady blowing- voice on, siren- complete 2 reps- Patient benefited from visual demonstration. Completed 3-4 sirens in 1 rep.    Patient will utilize a forward tone focused/resonant voice and vocal function exercises in 80% of opportunities to decrease vocal hyperfunction and improve voice quality & vocal projection (to be achieved in 4-6 weeks).      Patient will produce a coordinated voice production in different situations (e.g., telephone, over background noise, emotional topics, speaking at a distance) in 80% of opportunities as perceptually judged by clinician (to be achieved in 4-6 weeks).     Plan:  -Patient was provided with home exercises/activities to target goals in plan of care at the end of today's session.  -Continue with current plan of care.

## 2025-04-24 ENCOUNTER — OFFICE VISIT (OUTPATIENT)
Age: 65
End: 2025-04-24
Payer: COMMERCIAL

## 2025-04-24 DIAGNOSIS — J38.2 VOCAL CORD NODULE: Primary | ICD-10-CM

## 2025-04-24 PROCEDURE — 92507 TX SP LANG VOICE COMM INDIV: CPT

## 2025-04-24 NOTE — PROGRESS NOTES
Daily Speech Treatment Note    Today's date: 2025   Patient’s name: Allyson Espinoza  : 1960  MRN: [id number]  Safety measures: voice precautions  Referring provider: Cayden Aviles DO    Encounter Diagnosis     ICD-10-CM    1. Vocal cord nodule  J38.2             Visit Tracking:  POC expires Unit limit Auth Expiration date PT/OT + Visit Limit?   6/10/25 No cog tx codes 10/6/25 8                           Visit/Unit Tracking  AUTH Status:  Date 4/10 4/15 4/24            yes Used 1 2 3             Remaining  7 6 5                Subjective/Behavioral:  -Patient reports 2-4 for problem with voice (0 being no problem 10 being severe problem today.  -Patient reports completion of HEP: SOVTE (evening),stretching (morning).  -Patient reports nasal spray has been helping with allergies.      Objective/Assessment:    Short-term goals:  Patient will be educated on vocal hygiene and laryngeal massaging/stretches demonstrate understanding of recommendations to facilitate increased quality of voice (to be achieved in 2-3 weeks).   Targeting decreasing laryngeal tension-   Yes stretch-Completed 1 set of 10 reps.  Chin to chest stretch and elevation- Completed 1 set of 10 reps.     Completed 2-3 mins of laryngeal massaging. Patient reports increased tension/soreness on L side.       Patient will utilize diaphragmatic breathing during oral sentence/paragraph reading in 80% of opportunities to facilitate increased breath support for voice/speaking, decrease laryngeal effort, and improve glottic closure (to be achieved in 4-6 weeks).      Patient will utilize semi-occluded vocal tract exercises without laryngeal tension in 80% of opportunities to promote healthy vocal function (to be achieved in 4-6 weeks).        Semi-Occluded Vocal Tract Exercises- To target relaxed laryngeal posture and coordination between phonation and respiration, the following activities were completed using principles of SOVTE.      Cup  Bubbling  Steady blowing- no voice- 1 rep- Av secs- independently.Patient benefited from visual demonstration. Noted good steady continuous bubbles.   Steady blowing- Voice on-2 reps- Av secs --min cueing. Patient benefited from visual demonstration. No voice breaks.   Steady blowing- voice on, ascending glide- 3 reps- min cueing. Patient benefited from visual demonstration. Minor voice breaks for high note. More continuous with glides  Steady blowing- voice on, descending glide-2 reps-independently  Steady blowing- voice on, siren- complete 2 reps independently- Completed 3-4 sirens in 1 rep.    Steady blowing: 3x voice on: 2 sets of 10 reps: 70% without tension    Patient will utilize a forward tone focused/resonant voice and vocal function exercises in 80% of opportunities to decrease vocal hyperfunction and improve voice quality & vocal projection (to be achieved in 4-6 weeks).      Patient will produce a coordinated voice production in different situations (e.g., telephone, over background noise, emotional topics, speaking at a distance) in 80% of opportunities as perceptually judged by clinician (to be achieved in 4-6 weeks).     Plan:  -Patient was provided with home exercises/activities to target goals in plan of care at the end of today's session.  -Continue with current plan of care.

## 2025-05-01 ENCOUNTER — APPOINTMENT (OUTPATIENT)
Age: 65
End: 2025-05-01
Payer: COMMERCIAL

## 2025-05-08 ENCOUNTER — OFFICE VISIT (OUTPATIENT)
Age: 65
End: 2025-05-08
Payer: COMMERCIAL

## 2025-05-08 DIAGNOSIS — J38.2 VOCAL CORD NODULE: Primary | ICD-10-CM

## 2025-05-08 PROCEDURE — 92507 TX SP LANG VOICE COMM INDIV: CPT

## 2025-05-08 NOTE — PROGRESS NOTES
Daily Speech Treatment Note    Today's date: 2025   Patient’s name: Allyson Espinoza  : 1960  MRN: [id number]  Safety measures: voice precautions  Referring provider: Cayden Aviles DO    Encounter Diagnosis     ICD-10-CM    1. Vocal cord nodule  J38.2               Visit Tracking:  POC expires Unit limit Auth Expiration date PT/OT + Visit Limit?   6/10/25 No cog tx codes 10/6/25 8                           Visit/Unit Tracking  AUTH Status:  Date 4/10 4/15 4/24 5/8           yes Used 1 2 3 4            Remaining  7 6 5 4               Subjective/Behavioral:    Patient reported no new concerns for today's session.      Objective/Assessment: Discussed HEP and environmental triggers for voice.    Short-term goals:  Patient will be educated on vocal hygiene and laryngeal massaging/stretches demonstrate understanding of recommendations to facilitate increased quality of voice (to be achieved in 2-3 weeks).     Palpatory examination revealed mild mechanosensitivity (i.e., sensitivity to mechanical pressure/stretch) through the L side of laryngeal region. A mild palpatory pressure was suspected to show a moderate area of apparent thickening that, with stimulation/pressure/stretch. Patient verbalized that technique/pressure, through triggering his symptoms, was helpful.     Patient was able to identify regions of myofascial sensitivity and participate in stretches (e.g., elongation SCM, transverse SCM, inferior laryngeal glide) to reduce overall laryngeal tension and tissue fibrosis. Patient reported increased ease with trials. Completed 35 mins.        Self-rating:  -Start of session: 8/10 (with 10 being the worst)  -End of session: 2/10 - IMPROVEMENT  Notes:  -upright position (sitting) and deep prep II soft tissue massage cream       Patient will utilize diaphragmatic breathing during oral sentence/paragraph reading in 80% of opportunities to facilitate increased breath support for voice/speaking,  decrease laryngeal effort, and improve glottic closure (to be achieved in 4-6 weeks).      Patient will utilize semi-occluded vocal tract exercises without laryngeal tension in 80% of opportunities to promote healthy vocal function (to be achieved in 4-6 weeks).        Patient will utilize a forward tone focused/resonant voice and vocal function exercises in 80% of opportunities to decrease vocal hyperfunction and improve voice quality & vocal projection (to be achieved in 4-6 weeks).      Patient will produce a coordinated voice production in different situations (e.g., telephone, over background noise, emotional topics, speaking at a distance) in 80% of opportunities as perceptually judged by clinician (to be achieved in 4-6 weeks).     Plan:  -Patient was provided with home exercises/activities to target goals in plan of care at the end of today's session.  -Continue with current plan of care.   23

## 2025-05-14 NOTE — PROGRESS NOTES
Daily Speech Treatment Note    Today's date: 5/15/2025   Patient’s name: Allyson Espinoza  : 1960  MRN: [id number]  Safety measures: voice precautions  Referring provider: Cayden Aviles DO    Encounter Diagnosis     ICD-10-CM    1. Vocal cord nodule  J38.2         Visit Tracking:  POC expires Unit limit Auth Expiration date PT/OT + Visit Limit?   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      Objective/Assessment:     Short-term goals:  Patient will be educated on vocal hygiene and laryngeal massaging/stretches demonstrate understanding of recommendations to facilitate increased quality of voice (to be achieved in 2-3 weeks).     Palpatory examination revealed mild mechanosensitivity (i.e., sensitivity to mechanical pressure/stretch) through the L side of laryngeal region. A mild palpatory pressure was suspected to show a mild area of apparent thickening that, with stimulation/pressure/stretch. Patient verbalized that technique/pressure, through triggering his symptoms, was helpful.     Patient was able to identify regions of myofascial sensitivity and participate in stretches (e.g., elongation SCM, transverse SCM, inferior laryngeal glide) to reduce overall laryngeal tension and tissue fibrosis. Patient reported increased ease with trials. Completed 30 mins.        Self-rating:  -Start of session: 4/10 (with 10 being the worst)  -End of session: 1/10 - IMPROVEMENT  Notes:  -upright position (sitting) and deep prep II soft tissue massage cream       Patient will utilize diaphragmatic breathing during oral sentence/paragraph reading in 80% of opportunities to facilitate increased breath support for voice/speaking,  decrease laryngeal effort, and improve glottic closure (to be achieved in 4-6 weeks).     Utilizing diaphragmatic breathing more consistently in conversation!     Patient will utilize semi-occluded vocal tract exercises without laryngeal tension in 80% of opportunities to promote healthy vocal function (to be achieved in 4-6 weeks).      Patient report ascending glides improvement.    Patient will utilize a forward tone focused/resonant voice and vocal function exercises in 80% of opportunities to decrease vocal hyperfunction and improve voice quality & vocal projection (to be achieved in 4-6 weeks).     Completed 3 reps of ascending and descending glides lip trills.     Reviewed the purpose of resonant voice therapy and performed 3 reps single syllables, two syllables, and sentences from /m/ humming with 100% accuracy with no tension!     Patient will produce a coordinated voice production in different situations (e.g., telephone, over background noise, emotional topics, speaking at a distance) in 80% of opportunities as perceptually judged by clinician (to be achieved in 4-6 weeks).     Plan:  -Patient was provided with home exercises/activities to target goals in plan of care at the end of today's session.  -Continue with current plan of care.

## 2025-05-15 ENCOUNTER — OFFICE VISIT (OUTPATIENT)
Age: 65
End: 2025-05-15
Payer: COMMERCIAL

## 2025-05-15 DIAGNOSIS — J38.2 VOCAL CORD NODULE: Primary | ICD-10-CM

## 2025-05-15 PROCEDURE — 92507 TX SP LANG VOICE COMM INDIV: CPT

## 2025-05-25 DIAGNOSIS — E78.2 MIXED HYPERLIPIDEMIA: ICD-10-CM

## 2025-05-26 RX ORDER — ROSUVASTATIN CALCIUM 10 MG/1
10 TABLET, COATED ORAL DAILY
Qty: 90 TABLET | Refills: 1 | Status: SHIPPED | OUTPATIENT
Start: 2025-05-26

## 2025-06-11 DIAGNOSIS — G25.81 RLS (RESTLESS LEGS SYNDROME): ICD-10-CM

## 2025-06-12 RX ORDER — GABAPENTIN 100 MG/1
200 CAPSULE ORAL
Qty: 180 CAPSULE | Refills: 1 | Status: SHIPPED | OUTPATIENT
Start: 2025-06-12

## 2025-08-09 ENCOUNTER — APPOINTMENT (OUTPATIENT)
Dept: LAB | Facility: CLINIC | Age: 65
End: 2025-08-09
Payer: COMMERCIAL

## 2025-08-11 ENCOUNTER — OFFICE VISIT (OUTPATIENT)
Dept: FAMILY MEDICINE CLINIC | Facility: CLINIC | Age: 65
End: 2025-08-11
Payer: COMMERCIAL

## 2025-08-11 ENCOUNTER — PATIENT MESSAGE (OUTPATIENT)
Dept: FAMILY MEDICINE CLINIC | Facility: CLINIC | Age: 65
End: 2025-08-11

## 2025-08-11 PROBLEM — H81.10 BENIGN PAROXYSMAL POSITIONAL VERTIGO: Status: ACTIVE | Noted: 2025-08-11

## (undated) DEVICE — DRESSING TELFA 2 X 3 IN STRL

## (undated) DEVICE — GLOVE SRG BIOGEL 7

## (undated) DEVICE — BETHLEHEM UNIVERSAL MINOR GEN: Brand: CARDINAL HEALTH

## (undated) DEVICE — NEEDLE 25G X 1 1/2

## (undated) DEVICE — PAD GROUNDING ADULT

## (undated) DEVICE — POV-IOD SWAB STICKS

## (undated) DEVICE — GLOVE INDICATOR PI UNDERGLOVE SZ 7 BLUE

## (undated) DEVICE — COTTON BALLS: Brand: DEROYAL

## (undated) DEVICE — VESSEL LOOPS X-RAY DETECTABLE: Brand: DEROYAL

## (undated) DEVICE — INTENDED FOR TISSUE SEPARATION, AND OTHER PROCEDURES THAT REQUIRE A SHARP SURGICAL BLADE TO PUNCTURE OR CUT.: Brand: BARD-PARKER SAFETY BLADES SIZE 15, STERILE

## (undated) DEVICE — LUBRICANT SURGILUBE PACKETS STERILE 144 X 3 GRAMS

## (undated) DEVICE — POOLE SUCTION HANDLE: Brand: CARDINAL HEALTH

## (undated) DEVICE — ADHESIVE SKIN HIGH VISCOSITY EXOFIN 1ML

## (undated) DEVICE — SUT SILK 2-0 TIES 144 IN LA55G

## (undated) DEVICE — TUBING SUCTION 5MM X 12 FT

## (undated) DEVICE — SUT VICRYL 4-0 PS-2 27 IN J426H

## (undated) DEVICE — LIGHT HANDLE COVER SLEEVE DISP BLUE STELLAR